# Patient Record
Sex: MALE | Race: WHITE | NOT HISPANIC OR LATINO | Employment: OTHER | RURAL
[De-identification: names, ages, dates, MRNs, and addresses within clinical notes are randomized per-mention and may not be internally consistent; named-entity substitution may affect disease eponyms.]

---

## 2020-10-27 ENCOUNTER — HISTORICAL (OUTPATIENT)
Dept: ADMINISTRATIVE | Facility: HOSPITAL | Age: 70
End: 2020-10-27

## 2021-04-14 ENCOUNTER — HISTORICAL (OUTPATIENT)
Dept: ADMINISTRATIVE | Facility: HOSPITAL | Age: 71
End: 2021-04-14

## 2021-04-14 LAB
25(OH)D3 SERPL-MCNC: 29.2 NG/ML
ALBUMIN SERPL BCP-MCNC: 3.8 G/DL (ref 3.5–5)
ALBUMIN/GLOB SERPL: 1.3 {RATIO}
ALP SERPL-CCNC: 93 U/L (ref 45–115)
ALT SERPL W P-5'-P-CCNC: 26 U/L (ref 16–61)
ANION GAP SERPL CALCULATED.3IONS-SCNC: 9.8 MMOL/L (ref 7–16)
AST SERPL W P-5'-P-CCNC: 20 U/L (ref 15–37)
BASOPHILS # BLD AUTO: 0.02 X10E3/UL (ref 0–0.2)
BASOPHILS NFR BLD AUTO: 0.4 % (ref 0–1)
BILIRUB SERPL-MCNC: 0.4 MG/DL (ref 0–1.2)
BUN SERPL-MCNC: 22 MG/DL (ref 7–18)
BUN/CREAT SERPL: 24
CALCIUM SERPL-MCNC: 8.6 MG/DL (ref 8.5–10.1)
CHLORIDE SERPL-SCNC: 110 MMOL/L (ref 98–107)
CO2 SERPL-SCNC: 27 MMOL/L (ref 21–32)
CREAT SERPL-MCNC: 0.93 MG/DL (ref 0.7–1.3)
EOSINOPHIL # BLD AUTO: 0.49 X10E3/UL (ref 0–0.5)
EOSINOPHIL NFR BLD AUTO: 8.6 % (ref 1–4)
ERYTHROCYTE [DISTWIDTH] IN BLOOD BY AUTOMATED COUNT: 13.5 % (ref 11.5–14.5)
GLOBULIN SER-MCNC: 3 G/DL (ref 2–4)
GLUCOSE SERPL-MCNC: 103 MG/DL (ref 74–106)
HCT VFR BLD AUTO: 37.1 % (ref 40–54)
HGB BLD-MCNC: 11.7 G/DL (ref 13.5–18)
IMM GRANULOCYTES # BLD AUTO: 0.01 X10E3/UL (ref 0–0.04)
IMM GRANULOCYTES NFR BLD: 0.2 % (ref 0–0.4)
LYMPHOCYTES # BLD AUTO: 2.02 X10E3/UL (ref 1–4.8)
LYMPHOCYTES NFR BLD AUTO: 35.4 % (ref 27–41)
MCH RBC QN AUTO: 30.1 PG (ref 27–31)
MCHC RBC AUTO-ENTMCNC: 31.5 G/DL (ref 32–36)
MCV RBC AUTO: 95.4 FL (ref 80–96)
MONOCYTES # BLD AUTO: 0.4 X10E3/UL (ref 0–0.8)
MONOCYTES NFR BLD AUTO: 7 % (ref 2–6)
MPC BLD CALC-MCNC: 10.4 FL (ref 9.4–12.4)
NEUTROPHILS # BLD AUTO: 2.77 X10E3/UL (ref 1.8–7.7)
NEUTROPHILS NFR BLD AUTO: 48.4 % (ref 53–65)
NRBC # BLD AUTO: 0 X10E3/UL (ref 0–0)
NRBC, AUTO (.00): 0 /100 (ref 0–0)
PLATELET # BLD AUTO: 177 X10E3/UL (ref 150–400)
POTASSIUM SERPL-SCNC: 4.8 MMOL/L (ref 3.5–5.1)
PROT SERPL-MCNC: 6.8 G/DL (ref 6.4–8.2)
PSA SERPL-MCNC: 1.69 NG/ML (ref 0–4.1)
RBC # BLD AUTO: 3.89 X10E6/UL (ref 4.6–6.2)
SODIUM SERPL-SCNC: 142 MMOL/L (ref 136–145)
TSH SERPL DL<=0.005 MIU/L-ACNC: 0.88 UIU/ML (ref 0.36–3.74)
URATE SERPL-MCNC: 4.2 MG/DL (ref 3.5–7.2)
WBC # BLD AUTO: 5.71 X10E3/UL (ref 4.5–11)

## 2021-11-13 ENCOUNTER — HOSPITAL ENCOUNTER (EMERGENCY)
Facility: HOSPITAL | Age: 71
Discharge: HOME | End: 2021-11-13
Attending: FAMILY MEDICINE
Payer: MEDICARE

## 2021-11-13 VITALS
RESPIRATION RATE: 18 BRPM | SYSTOLIC BLOOD PRESSURE: 118 MMHG | TEMPERATURE: 98 F | HEART RATE: 95 BPM | DIASTOLIC BLOOD PRESSURE: 64 MMHG | OXYGEN SATURATION: 93 % | WEIGHT: 207 LBS

## 2021-11-13 DIAGNOSIS — S00.03XA CONTUSION OF SCALP, INITIAL ENCOUNTER: ICD-10-CM
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
## 2021-11-13 DIAGNOSIS — T14.90XA TRAUMA: ICD-10-CM

## 2021-11-13 LAB
ALBUMIN SERPL BCP-MCNC: 3.6 G/DL (ref 3.5–5)
ALBUMIN/GLOB SERPL: 1.2 {RATIO}
ALP SERPL-CCNC: 94 U/L (ref 45–115)
ALT SERPL W P-5'-P-CCNC: 38 U/L (ref 16–61)
ANION GAP SERPL CALCULATED.3IONS-SCNC: 13 MMOL/L (ref 7–16)
AST SERPL W P-5'-P-CCNC: 36 U/L (ref 15–37)
BASOPHILS # BLD AUTO: 0.01 K/UL (ref 0–0.2)
BASOPHILS NFR BLD AUTO: 0.1 % (ref 0–1)
BILIRUB SERPL-MCNC: 0.2 MG/DL (ref 0–1.2)
BUN SERPL-MCNC: 23 MG/DL (ref 7–18)
BUN/CREAT SERPL: 17 (ref 6–20)
CALCIUM SERPL-MCNC: 8.6 MG/DL (ref 8.5–10.1)
CHLORIDE SERPL-SCNC: 104 MMOL/L (ref 98–107)
CO2 SERPL-SCNC: 27 MMOL/L (ref 21–32)
CREAT SERPL-MCNC: 1.39 MG/DL (ref 0.7–1.3)
DIFFERENTIAL METHOD BLD: ABNORMAL
EOSINOPHIL # BLD AUTO: 0.38 K/UL (ref 0–0.5)
EOSINOPHIL NFR BLD AUTO: 5.3 % (ref 1–4)
ERYTHROCYTE [DISTWIDTH] IN BLOOD BY AUTOMATED COUNT: 12.4 % (ref 11.5–14.5)
GLOBULIN SER-MCNC: 3.1 G/DL (ref 2–4)
GLUCOSE SERPL-MCNC: 95 MG/DL (ref 74–106)
HCT VFR BLD AUTO: 33.6 % (ref 40–54)
HGB BLD-MCNC: 11.3 G/DL (ref 13.5–18)
IMM GRANULOCYTES # BLD AUTO: 0.03 K/UL (ref 0–0.04)
IMM GRANULOCYTES NFR BLD: 0.4 % (ref 0–0.4)
LYMPHOCYTES # BLD AUTO: 2.02 K/UL (ref 1–4.8)
LYMPHOCYTES NFR BLD AUTO: 28.2 % (ref 27–41)
MCH RBC QN AUTO: 32.5 PG (ref 27–31)
MCHC RBC AUTO-ENTMCNC: 33.6 G/DL (ref 32–36)
MCV RBC AUTO: 96.6 FL (ref 80–96)
MONOCYTES # BLD AUTO: 0.45 K/UL (ref 0–0.8)
MONOCYTES NFR BLD AUTO: 6.3 % (ref 2–6)
MPC BLD CALC-MCNC: 9 FL (ref 9.4–12.4)
NEUTROPHILS # BLD AUTO: 4.28 K/UL (ref 1.8–7.7)
NEUTROPHILS NFR BLD AUTO: 59.7 % (ref 53–65)
PLATELET # BLD AUTO: 170 K/UL (ref 150–400)
POTASSIUM SERPL-SCNC: 4.1 MMOL/L (ref 3.5–5.1)
PROT SERPL-MCNC: 6.7 G/DL (ref 6.4–8.2)
RBC # BLD AUTO: 3.48 M/UL (ref 4.6–6.2)
SODIUM SERPL-SCNC: 140 MMOL/L (ref 136–145)
WBC # BLD AUTO: 7.17 K/UL (ref 4.5–11)

## 2021-11-13 PROCEDURE — 80053 COMPREHEN METABOLIC PANEL: CPT | Performed by: FAMILY MEDICINE

## 2021-11-13 PROCEDURE — 99900035 HC TECH TIME PER 15 MIN (STAT)

## 2021-11-13 PROCEDURE — 99285 EMERGENCY DEPT VISIT HI MDM: CPT | Mod: 25

## 2021-11-13 PROCEDURE — 96372 THER/PROPH/DIAG INJ SC/IM: CPT

## 2021-11-13 PROCEDURE — 36415 COLL VENOUS BLD VENIPUNCTURE: CPT | Performed by: FAMILY MEDICINE

## 2021-11-13 PROCEDURE — 99284 EMERGENCY DEPT VISIT MOD MDM: CPT | Performed by: FAMILY MEDICINE

## 2021-11-13 PROCEDURE — 63600175 PHARM REV CODE 636 W HCPCS: Performed by: FAMILY MEDICINE

## 2021-11-13 PROCEDURE — 85025 COMPLETE CBC W/AUTO DIFF WBC: CPT | Performed by: FAMILY MEDICINE

## 2021-11-13 RX ORDER — OMEPRAZOLE 10 MG/1
10 CAPSULE, DELAYED RELEASE ORAL DAILY
COMMUNITY

## 2021-11-13 RX ORDER — ESCITALOPRAM OXALATE 10 MG/1
10 TABLET ORAL DAILY
COMMUNITY

## 2021-11-13 RX ORDER — ATORVASTATIN CALCIUM 10 MG/1
10 TABLET, FILM COATED ORAL DAILY
COMMUNITY

## 2021-11-13 RX ORDER — ASPIRIN 81 MG/1
81 TABLET ORAL DAILY
COMMUNITY

## 2021-11-13 RX ORDER — HYDROCODONE BITARTRATE AND ACETAMINOPHEN 5; 325 MG/1; MG/1
1 TABLET ORAL EVERY 4 HOURS PRN
Qty: 18 TABLET | Refills: 0 | Status: SHIPPED | OUTPATIENT
Start: 2021-11-13 | End: 2021-11-20 | Stop reason: ALTCHOICE

## 2021-11-13 RX ORDER — ONDANSETRON 2 MG/ML
4 INJECTION INTRAMUSCULAR; INTRAVENOUS
Status: COMPLETED | OUTPATIENT
Start: 2021-11-13 | End: 2021-11-13

## 2021-11-13 RX ORDER — LISINOPRIL 10 MG/1
10 TABLET ORAL DAILY
COMMUNITY

## 2021-11-13 RX ORDER — ALLOPURINOL 100 MG/1
100 TABLET ORAL DAILY
COMMUNITY

## 2021-11-13 RX ORDER — MORPHINE SULFATE 4 MG/ML
4 INJECTION, SOLUTION INTRAMUSCULAR; INTRAVENOUS
Status: COMPLETED | OUTPATIENT
Start: 2021-11-13 | End: 2021-11-13

## 2021-11-13 RX ADMIN — MORPHINE SULFATE 4 MG: 4 INJECTION INTRAVENOUS at 07:11

## 2021-11-13 RX ADMIN — ONDANSETRON 4 MG: 2 INJECTION INTRAMUSCULAR; INTRAVENOUS at 07:11

## 2021-11-14 ENCOUNTER — TELEPHONE (OUTPATIENT)
Dept: EMERGENCY MEDICINE | Facility: HOSPITAL | Age: 71
End: 2021-11-14
Payer: MEDICARE

## 2021-11-20 ENCOUNTER — HOSPITAL ENCOUNTER (EMERGENCY)
Facility: HOSPITAL | Age: 71
Discharge: HOME | End: 2021-11-20
Attending: SPECIALIST
Payer: MEDICARE

## 2021-11-20 VITALS
BODY MASS INDEX: 30.92 KG/M2 | OXYGEN SATURATION: 95 % | HEART RATE: 84 BPM | TEMPERATURE: 98 F | WEIGHT: 204 LBS | RESPIRATION RATE: 18 BRPM | HEIGHT: 68 IN | DIASTOLIC BLOOD PRESSURE: 61 MMHG | SYSTOLIC BLOOD PRESSURE: 118 MMHG

## 2021-11-20 DIAGNOSIS — M51.36 DDD (DEGENERATIVE DISC DISEASE), LUMBAR: ICD-10-CM

## 2021-11-20 DIAGNOSIS — M50.30 DEGENERATIVE DISC DISEASE, CERVICAL: ICD-10-CM
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
## 2021-11-20 DIAGNOSIS — M54.50 ACUTE MIDLINE LOW BACK PAIN, UNSPECIFIED WHETHER SCIATICA PRESENT: ICD-10-CM

## 2021-11-20 DIAGNOSIS — R07.81 RIB PAIN ON LEFT SIDE: ICD-10-CM

## 2021-11-20 DIAGNOSIS — M51.36 DEGENERATIVE DISC DISEASE, LUMBAR: ICD-10-CM

## 2021-11-20 DIAGNOSIS — S22.42XA CLOSED FRACTURE OF MULTIPLE RIBS OF LEFT SIDE, INITIAL ENCOUNTER: ICD-10-CM

## 2021-11-20 PROCEDURE — 96375 TX/PRO/DX INJ NEW DRUG ADDON: CPT

## 2021-11-20 PROCEDURE — 99285 EMERGENCY DEPT VISIT HI MDM: CPT | Mod: 25

## 2021-11-20 PROCEDURE — 63600175 PHARM REV CODE 636 W HCPCS: Performed by: SPECIALIST

## 2021-11-20 PROCEDURE — 99284 EMERGENCY DEPT VISIT MOD MDM: CPT | Performed by: SPECIALIST

## 2021-11-20 PROCEDURE — 96374 THER/PROPH/DIAG INJ IV PUSH: CPT

## 2021-11-20 RX ORDER — OXYCODONE AND ACETAMINOPHEN 7.5; 325 MG/1; MG/1
1 TABLET ORAL EVERY 6 HOURS PRN
Qty: 12 TABLET | Refills: 0 | Status: SHIPPED | OUTPATIENT
Start: 2021-11-20 | End: 2021-11-23

## 2021-11-20 RX ORDER — ONDANSETRON 2 MG/ML
INJECTION INTRAMUSCULAR; INTRAVENOUS
Status: DISPENSED
Start: 2021-11-20 | End: 2021-11-20

## 2021-11-20 RX ORDER — KETOROLAC TROMETHAMINE 30 MG/ML
INJECTION, SOLUTION INTRAMUSCULAR; INTRAVENOUS
Status: DISPENSED
Start: 2021-11-20 | End: 2021-11-20

## 2021-11-20 RX ORDER — HYDROMORPHONE HYDROCHLORIDE 1 MG/ML
0.5 INJECTION, SOLUTION INTRAMUSCULAR; INTRAVENOUS; SUBCUTANEOUS
Status: COMPLETED | OUTPATIENT
Start: 2021-11-20 | End: 2021-11-20

## 2021-11-20 RX ORDER — KETOROLAC TROMETHAMINE 15 MG/ML
15 INJECTION, SOLUTION INTRAMUSCULAR; INTRAVENOUS
Status: COMPLETED | OUTPATIENT
Start: 2021-11-20 | End: 2021-11-20

## 2021-11-20 RX ORDER — ONDANSETRON 2 MG/ML
4 INJECTION INTRAMUSCULAR; INTRAVENOUS
Status: COMPLETED | OUTPATIENT
Start: 2021-11-20 | End: 2021-11-20

## 2021-11-20 RX ORDER — AMOXICILLIN AND CLAVULANATE POTASSIUM 875; 125 MG/1; MG/1
1 TABLET, FILM COATED ORAL 2 TIMES DAILY
Qty: 20 TABLET | Refills: 0 | Status: SHIPPED | OUTPATIENT
Start: 2021-11-20 | End: 2021-11-30

## 2021-11-20 RX ORDER — HYDROMORPHONE HYDROCHLORIDE 1 MG/ML
INJECTION, SOLUTION INTRAMUSCULAR; INTRAVENOUS; SUBCUTANEOUS
Status: DISPENSED
Start: 2021-11-20 | End: 2021-11-20

## 2021-11-20 RX ADMIN — ONDANSETRON 4 MG: 2 INJECTION INTRAMUSCULAR; INTRAVENOUS at 10:11

## 2021-11-20 RX ADMIN — HYDROMORPHONE HYDROCHLORIDE 0.5 MG: 1 INJECTION, SOLUTION INTRAMUSCULAR; INTRAVENOUS; SUBCUTANEOUS at 10:11

## 2021-11-20 RX ADMIN — KETOROLAC TROMETHAMINE 15 MG: 15 INJECTION, SOLUTION INTRAMUSCULAR; INTRAVENOUS at 10:11

## 2021-11-21 ENCOUNTER — TELEPHONE (OUTPATIENT)
Dept: EMERGENCY MEDICINE | Facility: HOSPITAL | Age: 71
End: 2021-11-21
Payer: MEDICARE

## 2021-12-27 ENCOUNTER — HOSPITAL ENCOUNTER (EMERGENCY)
Facility: HOSPITAL | Age: 71
Discharge: HOME | End: 2021-12-27
Attending: SURGERY
Payer: MEDICARE

## 2021-12-27 VITALS
BODY MASS INDEX: 30.46 KG/M2 | TEMPERATURE: 98 F | DIASTOLIC BLOOD PRESSURE: 77 MMHG | HEIGHT: 68 IN | OXYGEN SATURATION: 98 % | RESPIRATION RATE: 22 BRPM | SYSTOLIC BLOOD PRESSURE: 137 MMHG | WEIGHT: 201 LBS | HEART RATE: 97 BPM

## 2021-12-27 DIAGNOSIS — S51.812A LACERATION OF LEFT FOREARM, INITIAL ENCOUNTER: Primary | ICD-10-CM

## 2021-12-27 PROCEDURE — 12034 INTMD RPR S/TR/EXT 7.6-12.5: CPT

## 2021-12-27 PROCEDURE — 25000003 PHARM REV CODE 250: Performed by: SURGERY

## 2021-12-27 PROCEDURE — 63600175 PHARM REV CODE 636 W HCPCS: Performed by: SURGERY

## 2021-12-27 PROCEDURE — 99282 EMERGENCY DEPT VISIT SF MDM: CPT | Mod: 25 | Performed by: SURGERY

## 2021-12-27 PROCEDURE — 13121 CMPLX RPR S/A/L 2.6-7.5 CM: CPT

## 2021-12-27 PROCEDURE — 96372 THER/PROPH/DIAG INJ SC/IM: CPT | Mod: 59

## 2021-12-27 PROCEDURE — 12034 INTMD RPR S/TR/EXT 7.6-12.5: CPT | Performed by: SURGERY

## 2021-12-27 PROCEDURE — 13122 CMPLX RPR S/A/L ADDL 5 CM/>: CPT

## 2021-12-27 PROCEDURE — 99284 EMERGENCY DEPT VISIT MOD MDM: CPT | Mod: 25

## 2021-12-27 RX ORDER — LIDOCAINE HYDROCHLORIDE AND EPINEPHRINE 10; 10 MG/ML; UG/ML
10 INJECTION, SOLUTION INFILTRATION; PERINEURAL ONCE
Status: COMPLETED | OUTPATIENT
Start: 2021-12-27 | End: 2021-12-27

## 2021-12-27 RX ORDER — MORPHINE SULFATE 2 MG/ML
2 INJECTION, SOLUTION INTRAMUSCULAR; INTRAVENOUS
Status: COMPLETED | OUTPATIENT
Start: 2021-12-27 | End: 2021-12-27

## 2021-12-27 RX ADMIN — MORPHINE SULFATE 2 MG: 2 INJECTION, SOLUTION INTRAMUSCULAR; INTRAVENOUS at 05:12

## 2021-12-27 RX ADMIN — LIDOCAINE HYDROCHLORIDE,EPINEPHRINE BITARTRATE 10 ML: 10; .01 INJECTION, SOLUTION INFILTRATION; PERINEURAL at 06:12

## 2021-12-27 RX ADMIN — BACITRACIN, NEOMYCIN, POLYMYXIN B 3 EACH: 400; 3.5; 5 OINTMENT TOPICAL at 06:12

## 2021-12-28 ENCOUNTER — TELEPHONE (OUTPATIENT)
Dept: EMERGENCY MEDICINE | Facility: HOSPITAL | Age: 71
End: 2021-12-28
Payer: MEDICARE

## 2021-12-29 ENCOUNTER — HOSPITAL ENCOUNTER (EMERGENCY)
Facility: HOSPITAL | Age: 71
Discharge: HOME | End: 2021-12-29
Attending: EMERGENCY MEDICINE
Payer: MEDICARE

## 2021-12-29 VITALS
RESPIRATION RATE: 18 BRPM | HEART RATE: 94 BPM | WEIGHT: 204.31 LBS | DIASTOLIC BLOOD PRESSURE: 72 MMHG | TEMPERATURE: 98 F | HEIGHT: 68 IN | OXYGEN SATURATION: 94 % | BODY MASS INDEX: 30.96 KG/M2 | SYSTOLIC BLOOD PRESSURE: 115 MMHG

## 2021-12-29 DIAGNOSIS — S51.812D LACERATION OF LEFT FOREARM, SUBSEQUENT ENCOUNTER: ICD-10-CM

## 2021-12-29 DIAGNOSIS — Z51.89 ENCOUNTER FOR WOUND RE-CHECK: Primary | ICD-10-CM

## 2021-12-29 PROCEDURE — 99283 EMERGENCY DEPT VISIT LOW MDM: CPT

## 2021-12-29 PROCEDURE — 25000003 PHARM REV CODE 250: Performed by: EMERGENCY MEDICINE

## 2021-12-29 PROCEDURE — 99024 POSTOP FOLLOW-UP VISIT: CPT | Performed by: EMERGENCY MEDICINE

## 2021-12-29 RX ORDER — LISINOPRIL AND HYDROCHLOROTHIAZIDE 12.5; 2 MG/1; MG/1
TABLET ORAL
COMMUNITY
Start: 2021-10-20

## 2021-12-29 RX ADMIN — BACITRACIN, NEOMYCIN, POLYMYXIN B 1 EACH: 400; 3.5; 5 OINTMENT TOPICAL at 08:12

## 2023-07-11 DIAGNOSIS — M54.9 CHRONIC BACK PAIN, UNSPECIFIED BACK LOCATION, UNSPECIFIED BACK PAIN LATERALITY: ICD-10-CM

## 2023-07-11 DIAGNOSIS — G89.29 CHRONIC BACK PAIN: Primary | ICD-10-CM

## 2023-07-11 DIAGNOSIS — G89.29 CHRONIC BACK PAIN, UNSPECIFIED BACK LOCATION, UNSPECIFIED BACK PAIN LATERALITY: ICD-10-CM

## 2023-07-11 DIAGNOSIS — M54.9 CHRONIC BACK PAIN: Primary | ICD-10-CM

## 2023-07-17 ENCOUNTER — CLINICAL SUPPORT (OUTPATIENT)
Dept: REHABILITATION | Facility: HOSPITAL | Age: 73
End: 2023-07-17
Payer: MEDICARE

## 2023-07-17 DIAGNOSIS — G89.29 CHRONIC BACK PAIN, UNSPECIFIED BACK LOCATION, UNSPECIFIED BACK PAIN LATERALITY: ICD-10-CM

## 2023-07-17 DIAGNOSIS — G89.29 CHRONIC BACK PAIN: ICD-10-CM

## 2023-07-17 DIAGNOSIS — G89.29 CHRONIC RIGHT-SIDED LOW BACK PAIN WITH RIGHT-SIDED SCIATICA: Primary | ICD-10-CM

## 2023-07-17 DIAGNOSIS — M54.9 CHRONIC BACK PAIN: ICD-10-CM

## 2023-07-17 DIAGNOSIS — M54.41 CHRONIC RIGHT-SIDED LOW BACK PAIN WITH RIGHT-SIDED SCIATICA: Primary | ICD-10-CM

## 2023-07-17 DIAGNOSIS — R52 PAIN: ICD-10-CM

## 2023-07-17 DIAGNOSIS — M54.9 CHRONIC BACK PAIN, UNSPECIFIED BACK LOCATION, UNSPECIFIED BACK PAIN LATERALITY: ICD-10-CM

## 2023-07-17 PROCEDURE — 97110 THERAPEUTIC EXERCISES: CPT

## 2023-07-17 PROCEDURE — 97014 ELECTRIC STIMULATION THERAPY: CPT

## 2023-07-17 PROCEDURE — 97161 PT EVAL LOW COMPLEX 20 MIN: CPT

## 2023-07-17 NOTE — PLAN OF CARE
"[facility]   Physical Therapy Initial Evaluation      Name: Anoop Harkins Sr.  Clinic Number: [id number]    Therapy Diagnosis:   Encounter Diagnoses   Name Primary?    Chronic back pain     Chronic back pain, unspecified back location, unspecified back pain laterality         Physician: Alexis Tanner MD    Physician Orders: PT Eval and Treat   Medical Diagnosis from Referral: chronic back pain   Evaluation Date: 7/17/2023  Authorization Period Expiration: 7/10/2024  Plan of Care Expiration: 8/18/2023  Progress Note Due: 8/18/2023  Visit # / Visits authorized: 1/ 8     Precautions: Standard     Time In: 8:09  Time Out: 9:00  Total Billable Time: 51 minutes    Subjective     Date of onset: one year or more    History of current condition - Elio reports: Patient with complaints of low back pain that runs down R side. Patient reports isolated rib/thoracic pain in one spot that causes pain up through shoulder. Patient reports increased pain with washing dishing and sitting shelling peas. Patient reports fall off horse in April but reports this same pain prior to fall. Patient reports possible torn rotator cuff in R shoulder. Patient reports years of being torn off of horses. Patient has had a year of back pain. Patient has not had any imaging. Patient referred for conservative care.     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  has a past surgical history that includes Shoulder surgery; Groin exploration; Hip surgery; Finger surgery; and Knee surgery.    Medications:   Anoop has a current medication list which includes the following prescription(s): allopurinol, aspirin, atorvastatin, escitalopram oxalate, lisinopril, lisinopril-hydrochlorothiazide, and omeprazole.    Allergies:   Review of patient's allergies indicates:   Allergen Reactions    Phenergan plain Itching    Tetanus vaccines and toxoid     Demerol [meperidine] Hallucinations        Objective      Posture:  Standing lordosis: Increased  Sitting lordosis: Increased  Iliac crest height: right increased  PSIS height: right increased  Pelvic rotation/torsion: Yes  Comments: right anterior innominate rotation    Forward bend: limited 50% with pain on R   Back bend:  limited 50% with pain in thoracic   Lateral trunk lean: B limited 50% with pain in thoracic   Trunk rotation: B limited 50% with pain in thoracic     MANUAL MUSCLE TEST  Right left   Hip flexion MMT number: 4-/5 MMT strength: 4+/5   Hip abduction MMT strength: 4/5 MMT strength: 4+/5   Knee extension MMT strength: 4/5 MMT strength: 4+/5   Knee flexion  MMT strength: 4-/5 MMT strength: 4+/5   Ankle dorsiflexion MMT strength: 4+/5 MMT strength: 4+/5   Ankle plantar flexion  MMT strength: 4+/5 MMT strength: 4+/5       Special test Right  Left    SLR test < 60 degrees Negative Negative   SLR test > 60 degrees Negative Negative   Sitting slump test Negative Negative   Piriformis test Negative Negative   NINO test Negative Negative   SI forward bend Negative Negative   SI distraction Negative Negative   SI compression Negative Negative     Hamstring Special Test:   R 45 degrees with sharp pain  L 25 degrees from full extension     Gait assessment:   No major abnormalities     Palpation: mod  "tenderness and tightness within B quadratus lumborum, thoracic paraspinals    Dermatome: intact      Treatment     Total Treatment time (time-based codes) separate from Evaluation: 36 minutes     Elio received the treatments listed below:      therapeutic exercises to develop strength, endurance, ROM, flexibility, posture, and core stabilization for 16 minutes including:      Ther-Ex    Nustep    Wedge    Hamstring Stretch    Posterior Pelvic Tilts 15 x 3"   Lower Trunk Rotations 5 x 10" each    Single Knee to Chest Stretch 5 x 10" each    Piriformis Stretch 2 x 30" each    Figure 4 Stretch    Hip Adduction    Hip Abduction               manual therapy techniques: Joint mobilizations were applied to the: see details below for 10 minutes, including:    PT completed palpation assessment and noted right anterior innominate rotation. Manual correction of pelvic malalignment completed with mod effort by PT. Patient reported decrease in previous symptoms following correction.     supervised modalities after being cleared for contradictions: biphasic estim:  Anoop received biphasic electrical stimulation for pain and decrease muscle tightness to thoracic and lumbar paraspinals. Pt received burst mode at a rate of 2 pps for 10 minutes. Anoop tolerated treatment well without any adverse effects.     hot pack for 10 minutes with estim.      Patient Education and Home Exercises     Education provided:   - eval findings, Plan of Care, Home exercise program     Written Home Exercises Provided: yes. Exercises were reviewed and Elio was able to demonstrate them prior to the end of the session.  Elio demonstrated good  understanding of the education provided. See EMR under Patient Instructions for exercises provided during therapy sessions.    Assessment     Anoop is a 73 y.o. male referred to outpatient Physical Therapy with a medical diagnosis of chronic low back pain. Patient presents with increased pain, decreased strength, " and decreased range of motion that limits functional mobility and gait. Patient able to complete exercises but did have increased amount of cramping in hamstrings. Mod tactile and verbal cuing with exercises to decrease compensations to improve form and improve core activation. PT corrected R hip anterior rotation with minimal change post correction. Modalities used to decrease soreness. No adverse effects post treatment.     Patient prognosis is Good.   Patient will benefit from skilled outpatient Physical Therapy to address the deficits stated above and in the chart below, provide patient /family education, and to maximize patientt's level of independence.     Plan of care discussed with patient: Yes  Patient's spiritual, cultural and educational needs considered and patient is agreeable to the plan of care and goals as stated below:     Anticipated Barriers for therapy: chronic pain, hobbies, compliance with Home exercise program     Medical Necessity is demonstrated by the following  History  Co-morbidities and personal factors that may impact the plan of care [x] LOW: no personal factors / co-morbidities  [] MODERATE: 1-2 personal factors / co-morbidities  [] HIGH: 3+ personal factors / co-morbidities    Moderate / High Support Documentation:   Co-morbidities affecting plan of care: R rotator cuff tear    Personal Factors:   hobbies     Examination  Body Structures and Functions, activity limitations and participation restrictions that may impact the plan of care [x] LOW: addressing 1-2 elements  [] MODERATE: 3+ elements  [] HIGH: 4+ elements (please support below)    Moderate / High Support Documentation: N/A     Clinical Presentation [x] LOW: stable  [] MODERATE: Evolving  [] HIGH: Unstable     Decision Making/ Complexity Score: low       Goals:  Short Term Goals: 2 weeks   Patient will report decrease in pain to 4/10 to improve quality of life  Patient will report decrease in radiating occurrences from 50%  to 25% to allow patient the ability to perform activities of daily living   Patient will increase R lower extremity strength to 4/5 to improve ambulation ability  Patient will increase R hamstring 90/90 to 45 degrees without sharp pain     Long Term Goals: 4 weeks   Patient will report decrease in pain to 2/10 to improve quality of life  Patient will report decrease in radiating occurrences from 25% to 10% to allow patient the ability to perform activities of daily living   Patient will increase R lower extremity strength to 4+/5 to improve ambulation ability  Patient will increase R hamstring 90/90 to 25 degrees from full extension without sharp pain   Plan     Plan of care Certification: 7/17/2023 to 8/18/2023.    Outpatient Physical Therapy 2 times weekly for 4 weeks to include the following interventions: Electrical Stimulation IFC/Biphasic, Gait Training, Manual Therapy, Moist Heat/ Ice, Neuromuscular Re-ed, Patient Education, Self Care, Therapeutic Activities, Therapeutic Exercise, and Ultrasound.     Ale Kirk, PT, DPT 7/17/2023      Physician Signature: __________________________________________________________________________      Date: ___________________________

## 2023-07-20 ENCOUNTER — CLINICAL SUPPORT (OUTPATIENT)
Dept: REHABILITATION | Facility: HOSPITAL | Age: 73
End: 2023-07-20
Payer: MEDICARE

## 2023-07-20 DIAGNOSIS — R52 PAIN: Primary | ICD-10-CM

## 2023-07-20 PROCEDURE — 97110 THERAPEUTIC EXERCISES: CPT

## 2023-07-20 PROCEDURE — 97014 ELECTRIC STIMULATION THERAPY: CPT

## 2023-07-20 NOTE — PROGRESS NOTES
"[facility]   Physical Therapy Treatment Note      Name: Anoop Harkins Sr.  Clinic Number: [id number]    Therapy Diagnosis:   Encounter Diagnosis   Name Primary?    Pain Yes     Physician: Alexis Tanner MD    Visit Date: 7/20/2023    Physician Orders: PT Eval and Treat   Medical Diagnosis from Referral: chronic back pain   Evaluation Date: 7/17/2023  Authorization Period Expiration: 7/10/2024  Plan of Care Expiration: 8/18/2023  Progress Note Due: 8/18/2023  Visit # / Visits authorized: 2/ 8      Precautions: Standard      Time In: 8:44  Time Out: 9:31  Total Billable Time: 47 minutes    PTA Visit #: 0/5       Subjective     Pt reports: "What you did helped I can tell a difference. I have been digging stumps on a track hoe and it hasn't bothered me. My shoulder and rib cage still hurt"    He was compliant with home exercise program.  Response to previous treatment: improved pain  Functional change: too early in Plan of Care     Pain: 3/10  Location: right shoulder      Objective      Objective Measures updated at progress report unless specified.     Treatment     Elio received the treatments listed below:      therapeutic exercises to develop strength, endurance, ROM, flexibility, posture, and core stabilization for 16 minutes including:        Ther-Ex     Bike   8 minutes   Wedge  2 minutes    Hamstring Stretch  2 x 30" each    Posterior Pelvic Tilts 15 x 3"   Lower Trunk Rotations 5 x 10" each    Single Knee to Chest Stretch 5 x 10" each    Piriformis Stretch 2 x 30" each    Figure 4 Stretch  2 x 30" each    Hip Adduction     Hip Abduction                     Not performed---- manual therapy techniques: Joint mobilizations were applied to the: see details below for 10 minutes, including:     PT completed palpation assessment and noted right anterior innominate rotation. Manual correction of pelvic malalignment completed with mod effort by PT. Patient reported decrease in previous " symptoms following correction.      supervised modalities after being cleared for contradictions: biphasic estim:  Anoop received biphasic electrical stimulation for pain and decrease muscle tightness to thoracic and lumbar paraspinals. Pt received burst mode at a rate of 2 pps for 10 minutes. Anoop tolerated treatment well without any adverse effects.      hot pack for 10 minutes with estim.    Patient Education and Home Exercises       Education provided:   - Home exercise program     Written Home Exercises Provided: Patient instructed to cont prior HEP. Exercises were reviewed and Elio was able to demonstrate them prior to the end of the session.  Elio demonstrated good  understanding of the education provided. See EMR under Patient Instructions for exercises provided during therapy sessions    Assessment     Anoop is a 73 y.o. male referred to outpatient Physical Therapy with a medical diagnosis of chronic low back pain. Patient presents with increased pain, decreased strength, and decreased range of motion that limits functional mobility and gait. Patient able to complete exercises with mod tactile and verbal cuing to decrease compensations to improve form and stretch. PT assessed pelvis to find no mal-alignments noted. Modalities used to decrease soreness and muscle tightness. No adverse effects post treatment.     Elio Is progressing well towards his goals.   Pt prognosis is Good.     Pt will continue to benefit from skilled outpatient physical therapy to address the deficits listed in the problem list box on initial evaluation, provide pt/family education and to maximize pt's level of independence in the home and community environment.     Pt's spiritual, cultural and educational needs considered and pt agreeable to plan of care and goals.     Anticipated barriers to physical therapy: chronic pain, hobbies, compliance with Home exercise program     Goals:   Short Term Goals: 2 weeks   Patient will report  decrease in pain to 4/10 to improve quality of life  Patient will report decrease in radiating occurrences from 50% to 25% to allow patient the ability to perform activities of daily living   Patient will increase R lower extremity strength to 4/5 to improve ambulation ability  Patient will increase R hamstring 90/90 to 45 degrees without sharp pain      Long Term Goals: 4 weeks   Patient will report decrease in pain to 2/10 to improve quality of life  Patient will report decrease in radiating occurrences from 25% to 10% to allow patient the ability to perform activities of daily living   Patient will increase R lower extremity strength to 4+/5 to improve ambulation ability  Patient will increase R hamstring 90/90 to 25 degrees from full extension without sharp pain    Plan     Plan of care Certification: 7/17/2023 to 8/18/2023.     Outpatient Physical Therapy 2 times weekly for 4 weeks to include the following interventions: Electrical Stimulation IFC/Biphasic, Gait Training, Manual Therapy, Moist Heat/ Ice, Neuromuscular Re-ed, Patient Education, Self Care, Therapeutic Activities, Therapeutic Exercise, and Ultrasound.     Ale Kirk, PT, DPT 7/20/2023

## 2023-07-24 ENCOUNTER — CLINICAL SUPPORT (OUTPATIENT)
Dept: REHABILITATION | Facility: HOSPITAL | Age: 73
End: 2023-07-24
Payer: MEDICARE

## 2023-07-24 DIAGNOSIS — R52 PAIN: Primary | ICD-10-CM

## 2023-07-24 PROCEDURE — 97014 ELECTRIC STIMULATION THERAPY: CPT

## 2023-07-24 PROCEDURE — 97110 THERAPEUTIC EXERCISES: CPT

## 2023-07-24 NOTE — PROGRESS NOTES
"[facility]   Physical Therapy Treatment Note      Name: Anoop Harkins Sr.  Clinic Number: [id number]    Therapy Diagnosis:   Encounter Diagnosis   Name Primary?    Pain Yes       Physician: Alexis Tanner MD    Visit Date: 7/24/2023    Physician Orders: PT Eval and Treat   Medical Diagnosis from Referral: chronic back pain   Evaluation Date: 7/17/2023  Authorization Period Expiration: 7/10/2024  Plan of Care Expiration: 8/18/2023  Progress Note Due: 8/18/2023  Visit # / Visits authorized: 3/8      Precautions: Standard      Time In: 8:04  Time Out:8:54  Total Billable Time: 50 minutes (42 minutes billable and 8 minutes not billable for MHP)    PTA Visit #: 0/5   Subjective     Pt reports: "My lower back is feeling much better, but I am hurting in that one spot higher up. I picked butter beans this weekend and my lower back didn't bother me."     He was compliant with home exercise program.  Response to previous treatment: improved pain  Functional change: too early in Plan of Care     Pain: 3/10  Location: right periscapular and thoracic region      Objective      Objective Measures updated at progress report unless specified.     Treatment     Elio received the treatments listed below:      therapeutic exercises to develop strength, endurance, ROM, flexibility, posture, and core stabilization including:        Ther-Ex     Bike   8 minutes   Wedge  2 minutes    Hamstring Stretch  2 x 30" each (not completed)   Posterior Pelvic Tilts 15 x 3" (not completed)   Lower Trunk Rotations 5 x 10" each (not completed)   Single Knee to Chest Stretch 5 x 10" each (not completed)   Piriformis Stretch 2 x 30" each (not completed)   Figure 4 Stretch  2 x 30" each (not completed)        Rhomboid stretch  2 x 20"    Doorway stretch  2 x 20"    Upper Trapezius Stretch  2 x 20" each    Levator Scapulae Stretch  2 x 20" each    Scapular Retractions  20 x 3"        Trunk Rotations  5 x 10" each    Thoracic " "Extensions  3 x 10" in 2 positions    [employer]  ---     Direct contact modalities after being cleared for contradictions: PT completed ESTIM/US combo to R rhomboid to decrease tissue tightness and trigger points in order to decrease patient's pain. Patient received combo for 8 minutes in prone position. He tolerated treatment with no adverse effects.      Hot pack to cervical and thoracic muscles following treatment for 8 minutes to decrease feeling of stiffness.     Patient Education and Home Exercises       Education provided:   - Home exercise program     Written Home Exercises Provided: Patient instructed to cont prior HEP. Exercises were reviewed and Elio was able to demonstrate them prior to the end of the session.  Elio demonstrated good  understanding of the education provided. See EMR under Patient Instructions for exercises provided during therapy sessions    Assessment     Anoop is a 73 y.o. male referred to outpatient Physical Therapy with a medical diagnosis of chronic low back pain. Patient presents with increased pain, decreased strength, and decreased range of motion that limits functional mobility and gait. PT refocused treatment to treating patient's thoracic and periscapular symptoms and pain. Patient had palpable areas of increased tissue tightness and trigger points in right rhomboid and thoracic paraspinal muscles. Patient's tissue extensibility and irritation improved following ESTIM/US combo. PT provided patient with visual, verbal, and tactile cueing for newly added thoracic and scapular muscle stretches. Patient reported feeling "stiff" in his neck following stretches. PT applied MHP to promote increased tissue relaxation at end of treatment. Patient reported feeling better and end of session. No adverse effects noted to treatment interventions.      Elio Is progressing well towards his goals.   Pt prognosis is Good.     Pt will continue to benefit from skilled outpatient physical " therapy to address the deficits listed in the problem list box on initial evaluation, provide pt/family education and to maximize pt's level of independence in the home and community environment.     Pt's spiritual, cultural and educational needs considered and pt agreeable to plan of care and goals.     Anticipated barriers to physical therapy: chronic pain, hobbies, compliance with Home exercise program     Goals:   Short Term Goals: 2 weeks   Patient will report decrease in pain to 4/10 to improve quality of life  Patient will report decrease in radiating occurrences from 50% to 25% to allow patient the ability to perform activities of daily living   Patient will increase R lower extremity strength to 4/5 to improve ambulation ability  Patient will increase R hamstring 90/90 to 45 degrees without sharp pain      Long Term Goals: 4 weeks   Patient will report decrease in pain to 2/10 to improve quality of life  Patient will report decrease in radiating occurrences from 25% to 10% to allow patient the ability to perform activities of daily living   Patient will increase R lower extremity strength to 4+/5 to improve ambulation ability  Patient will increase R hamstring 90/90 to 25 degrees from full extension without sharp pain    Plan     Plan of care Certification: 7/17/2023 to 8/18/2023.     Outpatient Physical Therapy 2 times weekly for 4 weeks to include the following interventions: Electrical Stimulation IFC/Biphasic, Gait Training, Manual Therapy, Moist Heat/ Ice, Neuromuscular Re-ed, Patient Education, Self Care, Therapeutic Activities, Therapeutic Exercise, and Ultrasound.     Mickey Bustillos, PT, DPT 7/24/2023

## 2023-07-27 ENCOUNTER — CLINICAL SUPPORT (OUTPATIENT)
Dept: REHABILITATION | Facility: HOSPITAL | Age: 73
End: 2023-07-27
Payer: MEDICARE

## 2023-07-27 DIAGNOSIS — R52 PAIN: Primary | ICD-10-CM

## 2023-07-27 PROCEDURE — 97110 THERAPEUTIC EXERCISES: CPT

## 2023-07-27 PROCEDURE — 97140 MANUAL THERAPY 1/> REGIONS: CPT

## 2023-07-27 NOTE — PROGRESS NOTES
"[facility]   Physical Therapy Treatment Note      Name: Anoop Harkins Sr.  Clinic Number: [id number]    Therapy Diagnosis:   Encounter Diagnosis   Name Primary?    Pain Yes       Physician: Alexis Tanner MD    Visit Date: 7/27/2023    Physician Orders: PT Eval and Treat   Medical Diagnosis from Referral: chronic back pain   Evaluation Date: 7/17/2023  Authorization Period Expiration: 7/10/2024  Plan of Care Expiration: 8/18/2023  Progress Note Due: 8/18/2023  Visit # / Visits authorized: 4/8      Precautions: Standard      Time In: 8:02  Time Out:8:49  Total Billable Time: 47 minutes     PTA Visit #: 0/5   Subjective     Pt reports: "My butt hurt when I was shelling butter beans yesterday."     He was compliant with home exercise program.  Response to previous treatment: improved pain  Functional change: too early in Plan of Care     Pain: 3/10  Location: right periscapular and thoracic region      Objective      Objective Measures updated at progress report unless specified.     Treatment     Elio received the treatments listed below:      therapeutic exercises to develop strength, endurance, ROM, flexibility, posture, and core stabilization including:        Ther-Ex     Bike   8 minutes   Wedge  2 minutes    Hamstring Stretch  2 x 30" each    Posterior Pelvic Tilts 15 x 3"    Lower Trunk Rotations 5 x 10" each    Single Knee to Chest Stretch 5 x 10" each    Piriformis Stretch 2 x 30" each    Figure 4 Stretch  2 x 30" each         Rhomboid stretch  2 x 20"    Doorway stretch  2 x 20"    Upper Trapezius Stretch  2 x 20" each    Levator Scapulae Stretch  2 x 20" each    Scapular Retractions  20 x 3"        Trunk Rotations  5 x 10" each    Thoracic Extensions  3 x 10" in 2 positions    Open Books  5 x 10" each *     PT completed palpation assessment and noted right upslip and anterior innominate rotation. Manual correction of pelvic malalignment completed with Single: mod effort by PT. " Patient reported decreased in previous symptoms following correction.     Not performed--- Direct contact modalities after being cleared for contradictions: PT completed ESTIM/US combo to R rhomboid to decrease tissue tightness and trigger points in order to decrease patient's pain. Patient received combo for 8 minutes in prone position. He tolerated treatment with no adverse effects.      Not performed---- Hot pack to cervical and thoracic muscles following treatment for 8 minutes to decrease feeling of stiffness.     Patient Education and Home Exercises       Education provided:   - Home exercise program     Written Home Exercises Provided: Patient instructed to cont prior HEP. Exercises were reviewed and Elio was able to demonstrate them prior to the end of the session.  Elio demonstrated good  understanding of the education provided. See EMR under Patient Instructions for exercises provided during therapy sessions    Assessment     Anoop is a 73 y.o. male referred to outpatient Physical Therapy with a medical diagnosis of chronic low back pain. Patient presents with increased pain, decreased strength, and decreased range of motion that limits functional mobility and gait. PT refocused treatment to treating patient's thoracic and periscapular symptoms and pain. Patient had palpable areas of increased tissue tightness and trigger points in right rhomboid and thoracic paraspinal muscles. PT instructed patient in upper and lower stretches to decrease tightness. Patient required continued mod cuing with upper thoracic and cervical stretches. Patient with hip and buttock pain. PT assessed alignment and performed manuals with decreased pain post. Patient without adverse effects or pain post treatment.     Elio Is progressing well towards his goals.   Pt prognosis is Good.     Pt will continue to benefit from skilled outpatient physical therapy to address the deficits listed in the problem list box on initial  evaluation, provide pt/family education and to maximize pt's level of independence in the home and community environment.     Pt's spiritual, cultural and educational needs considered and pt agreeable to plan of care and goals.     Anticipated barriers to physical therapy: chronic pain, hobbies, compliance with Home exercise program     Goals:   Short Term Goals: 2 weeks   Patient will report decrease in pain to 4/10 to improve quality of life  Patient will report decrease in radiating occurrences from 50% to 25% to allow patient the ability to perform activities of daily living   Patient will increase R lower extremity strength to 4/5 to improve ambulation ability  Patient will increase R hamstring 90/90 to 45 degrees without sharp pain      Long Term Goals: 4 weeks   Patient will report decrease in pain to 2/10 to improve quality of life  Patient will report decrease in radiating occurrences from 25% to 10% to allow patient the ability to perform activities of daily living   Patient will increase R lower extremity strength to 4+/5 to improve ambulation ability  Patient will increase R hamstring 90/90 to 25 degrees from full extension without sharp pain    Plan     Plan of care Certification: 7/17/2023 to 8/18/2023.     Outpatient Physical Therapy 2 times weekly for 4 weeks to include the following interventions: Electrical Stimulation IFC/Biphasic, Gait Training, Manual Therapy, Moist Heat/ Ice, Neuromuscular Re-ed, Patient Education, Self Care, Therapeutic Activities, Therapeutic Exercise, and Ultrasound.     Ale Kirk, PT, DPT 7/27/2023

## 2023-08-01 ENCOUNTER — CLINICAL SUPPORT (OUTPATIENT)
Dept: REHABILITATION | Facility: HOSPITAL | Age: 73
End: 2023-08-01
Payer: MEDICARE

## 2023-08-01 DIAGNOSIS — R52 PAIN: Primary | ICD-10-CM

## 2023-08-01 PROCEDURE — 97110 THERAPEUTIC EXERCISES: CPT | Mod: CQ

## 2023-08-01 NOTE — PROGRESS NOTES
"[facility]   Physical Therapy Treatment Note      Name: Anoop Harkins Sr.  Clinic Number: [id number]    Therapy Diagnosis:   Encounter Diagnosis   Name Primary?    Pain Yes       Physician: Alexis Tanner MD    Visit Date: 8/1/2023    Physician Orders: PT Eval and Treat   Medical Diagnosis from Referral: chronic back pain   Evaluation Date: 7/17/2023  Authorization Period Expiration: 7/10/2024  Plan of Care Expiration: 8/18/2023  Progress Note Due: 8/18/2023  Visit # / Visits authorized: 5/8      Precautions: Standard      Time In: 8:02  Time Out:8:41  Total Billable Time: 39 minutes     PTA Visit #: 1/5   Subjective     Pt reports: "I'm a little sore after cutting corn off the cob yesterday."     He was compliant with home exercise program.  Response to previous treatment: improved pain  Functional change: too early in Plan of Care     Pain: 3/10  Location: right periscapular and thoracic region      Objective      Objective Measures updated at progress report unless specified.     Treatment     Elio received the treatments listed below:      therapeutic exercises to develop strength, endurance, ROM, flexibility, posture, and core stabilization including:        Ther-Ex     Bike   8 minutes   Wedge  2 minutes    Hamstring Stretch  2 x 30" each    Posterior Pelvic Tilts 15 x 3"    Lower Trunk Rotations 5 x 10" each    Single Knee to Chest Stretch 5 x 10" each    Piriformis Stretch 2 x 30" each    Figure 4 Stretch  2 x 30" each         Rhomboid stretch  2 x 20"    Doorway stretch  2 x 20"    Upper Trapezius Stretch  2 x 20" each    Levator Scapulae Stretch  2 x 20" each    Scapular Retractions  20 x 3"        Trunk Rotations  5 x 10" each    Thoracic Extensions  3 x 10" in 2 positions    Open Books  5 x 10" each *     NOT PERFORMED---PT completed palpation assessment and noted right upslip and anterior innominate rotation. Manual correction of pelvic malalignment completed with Single: mod " effort by PT. Patient reported decreased in previous symptoms following correction.     Not performed--- Direct contact modalities after being cleared for contradictions: PT completed ESTIM/US combo to R rhomboid to decrease tissue tightness and trigger points in order to decrease patient's pain. Patient received combo for 8 minutes in prone position. He tolerated treatment with no adverse effects.      Not performed---- Hot pack to cervical and thoracic muscles following treatment for 8 minutes to decrease feeling of stiffness.     Patient Education and Home Exercises       Education provided:   - Home exercise program     Written Home Exercises Provided: Patient instructed to cont prior HEP. Exercises were reviewed and Elio was able to demonstrate them prior to the end of the session.  Elio demonstrated good  understanding of the education provided. See EMR under Patient Instructions for exercises provided during therapy sessions    Assessment     Anoop is a 73 y.o. male referred to outpatient Physical Therapy with a medical diagnosis of chronic low back pain. Patient presents with increased pain, decreased strength, and decreased range of motion that limits functional mobility and gait. LPTA provided pt with setup on bike to decrease stiffness and promote musculoskeletal warmup.  Pt displayed increased carryover of previous tx. Pt required mod cueing for progression through exercises with proper form and count. Pt reports overall decrease in pain symptoms since evaluation.  LPTA assessed alignment and determined no pelvic malalignment. No adverse effects noted.    Elio Is progressing well towards his goals.   Pt prognosis is Good.     Pt will continue to benefit from skilled outpatient physical therapy to address the deficits listed in the problem list box on initial evaluation, provide pt/family education and to maximize pt's level of independence in the home and community environment.     Pt's spiritual,  cultural and educational needs considered and pt agreeable to plan of care and goals.     Anticipated barriers to physical therapy: chronic pain, hobbies, compliance with Home exercise program     Goals:   Short Term Goals: 2 weeks   Patient will report decrease in pain to 4/10 to improve quality of life  Patient will report decrease in radiating occurrences from 50% to 25% to allow patient the ability to perform activities of daily living   Patient will increase R lower extremity strength to 4/5 to improve ambulation ability  Patient will increase R hamstring 90/90 to 45 degrees without sharp pain      Long Term Goals: 4 weeks   Patient will report decrease in pain to 2/10 to improve quality of life  Patient will report decrease in radiating occurrences from 25% to 10% to allow patient the ability to perform activities of daily living   Patient will increase R lower extremity strength to 4+/5 to improve ambulation ability  Patient will increase R hamstring 90/90 to 25 degrees from full extension without sharp pain    Plan     Plan of care Certification: 7/17/2023 to 8/18/2023.     Outpatient Physical Therapy 2 times weekly for 4 weeks to include the following interventions: Electrical Stimulation IFC/Biphasic, Gait Training, Manual Therapy, Moist Heat/ Ice, Neuromuscular Re-ed, Patient Education, Self Care, Therapeutic Activities, Therapeutic Exercise, and Ultrasound.   Hold for a week, if no increased symptoms will discharge per pt call and PT order.  AUSTYN Canela  8/1/2023

## 2024-01-19 ENCOUNTER — CLINICAL SUPPORT (OUTPATIENT)
Dept: REHABILITATION | Facility: HOSPITAL | Age: 74
End: 2024-01-19
Payer: MEDICARE

## 2024-01-19 DIAGNOSIS — Z98.890 S/P RIGHT ROTATOR CUFF REPAIR: Primary | ICD-10-CM

## 2024-01-19 DIAGNOSIS — Z98.890 S/P RIGHT ROTATOR CUFF REPAIR: ICD-10-CM

## 2024-01-19 DIAGNOSIS — M25.511 ACUTE PAIN OF RIGHT SHOULDER: Primary | ICD-10-CM

## 2024-01-19 PROCEDURE — 97110 THERAPEUTIC EXERCISES: CPT

## 2024-01-19 PROCEDURE — 97016 VASOPNEUMATIC DEVICE THERAPY: CPT

## 2024-01-19 PROCEDURE — 97161 PT EVAL LOW COMPLEX 20 MIN: CPT

## 2024-01-19 NOTE — PLAN OF CARE
[facility]   Physical Therapy Initial Evaluation      Name: Anoop Harkins Sr.  Clinic Number: [id number]    Therapy Diagnosis:   Encounter Diagnoses   Name Primary?    S/P right rotator cuff repair     Acute pain of right shoulder Yes        Physician: Keven Vazquez Jr., *    Physician Orders: PT Eval and Treat   Medical Diagnosis from Referral: R rotator cuff repair and subacromial debridement   Evaluation Date: 1/19/2024  Authorization Period Expiration: 1/18/2025  Plan of Care Expiration: 3/1/2024  Progress Note Due: 3/1/2024  Date of Surgery: 1/18/2024  Visit # / Visits authorized: 1/12   FOTO: to be completed on visit 6     Precautions: Standard     Time In: 10:15  Time Out: 10:54  Total Billable Time: 39 minutes    Subjective     Date of onset: 1/18/2024    History of current condition - Elio reports: R rotator cuff repair and subacromial debridement on 1/18/2024 after chronic history of R shoulder pain. Patient reports that his nerve block hasn't worn off yet in his R shoulder so he denies pain at this time. Patient reports that his wife is assisting him with dressing and ADLs at this time. Patient has previous history of B bicep tears and previous RTC repair on L shoulder    Falls: None     Imaging: MRI studies: prior to surgery     Prior Therapy: None   Social History:  lives with their spouse  Occupation: Business owner   Prior Level of Function: independent   Current Level of Function: patient is currently requiring assistance for ADLs     Pain:  Current 1/10, worst 1/10, best 1/10   Location: right shoulder  Description: Aching  Aggravating Factors: no identified aggravating factors   Easing Factors: pain medication and ice    Patients goals: be able to use this R arm to tend to horses      Medical History:   Past Medical History:   Diagnosis Date    Coronary artery disease     Depression     GERD (gastroesophageal reflux disease)     Gout     Hypertension        Surgical  History:   Anoop Harkins .  has a past surgical history that includes Shoulder surgery; Groin exploration; Hip surgery; Finger surgery; and Knee surgery.    Medications:   Anoop has a current medication list which includes the following prescription(s): allopurinol, aspirin, atorvastatin, escitalopram oxalate, lisinopril, lisinopril-hydrochlorothiazide, and omeprazole.    Allergies:   Review of patient's allergies indicates:   Allergen Reactions    Phenergan plain Itching    Tetanus vaccines and toxoid     Demerol [meperidine] Hallucinations        Objective    Incisions: Patient has four laparoscopic incisions in R shoulder that have no signs of drainage at this time.    Posture: rounded shoulders Yes, forward head Yes, increased kyphotic curve No    Range of motion  Motion Right  Left    Shoulder flexion 131 degrees  WITHIN FUNCTIONAL LIMITS   Shoulder abduction Not measured today  WITHIN FUNCTIONAL LIMITS   Shoulder internal rotation 35 degrees  WITHIN FUNCTIONAL LIMITS   Shoulder external rotation 70 degrees  WITHIN FUNCTIONAL LIMITS   Elbow flexion WITHIN FUNCTIONAL LIMITS WITHIN FUNCTIONAL LIMITS   Elbow extension WITHIN FUNCTIONAL LIMITS WITHIN FUNCTIONAL LIMITS   Wrist flexion WITHIN FUNCTIONAL LIMITS WITHIN FUNCTIONAL LIMITS   Wrist extension WITHIN FUNCTIONAL LIMITS WITHIN FUNCTIONAL LIMITS   Ulnar deviation WITHIN FUNCTIONAL LIMITS WITHIN FUNCTIONAL LIMITS   Radial deviation WITHIN FUNCTIONAL LIMITS WITHIN FUNCTIONAL LIMITS     Passive range of motion: R shoulder ROM measurements were all Passive range of motion today     MANUAL MUSCLE TEST  Muscle Right  Left    Shoulder flexion MMT strength: 2-/5 MMT strength: 4+/5   Shoulder extension MMT strength: 2-/5 MMT strength: 4+/5   Shoulder abduction MMT strength: 2-/5 MMT strength: 4+/5   Shoulder internal rotation MMT strength: 2-/5 MMT strength: 4+/5   Shoulder external rotation MMT strength: 2-/5 MMT strength: 4+/5   Elbow flexion MMT strength: 3/5 MMT  "strength: 4+/5   Elbow extension MMT strength: 3/5 MMT strength: 4+/5   Wrist flexion MMT strength: 3+/5 MMT strength: 4+/5   Wrist extension MMT strength: 3+/5 MMT strength: 4+/5   Ulnar deviation MMT strength: 3+/5 MMT strength: 4+/5   Radial deviation MMT strength: 3+/5 MMT strength: 4+/5     Functional ability:  Dressing: AMB PT LEVEL OF ASSISTANCE: mod A   Driving: AMB PT LEVEL OF ASSISTANCE: unable   Overhead activity: AMB PT LEVEL OF ASSISTANCE: unable   Work/hobbies: AMB PT LEVEL OF ASSISTANCE: unable     Clinical test:  NA post operative     Palpation:    Intake Outcome Measure for FOTO Survey    Therapist reviewed FOTO scores for Anoop Torin Chen on 1/19/2024.   FOTO report - see Media section or FOTO account episode details.    Intake Score: 49%         Treatment     Total Treatment time (time-based codes) separate from Evaluation: 24 minutes     Elio received the treatments listed below:      therapeutic exercises to develop strength, endurance, ROM, and posture for 14 minutes including: see flowsheet below     Ther-Ex Reps        Pendulums  ----   Wrist flexion and extension  20 x each    Radial and Ulnar Deviation  20 x each    Ball Squeezes  30 x    Pronation and Supination  20 x        Isometrics    Shoulder Flexion  20 x 3"   Internal and External Rotation 20 x 3"    Shoulder Abduction  20 x 3"    Elbow Flexion  20 x 3"                        R shoulder Passive range of motion  Completed              supervised modalities after being cleared for contradictions: Vasopneumatic device applied to R shoulder on minimal pressure for 10 minutes to decrease pain and edema.       Patient Education and Home Exercises     Education provided:   - eval findings, plan of care, and Home exercise program     Written Home Exercises Provided: yes. Exercises were reviewed and Elio was able to demonstrate them prior to the end of the session.  Elio demonstrated good  understanding of the education provided. See EMR " under Patient Instructions for exercises provided during therapy sessions.    Assessment     Anoop is a 73 y.o. male referred to outpatient Physical Therapy with a medical diagnosis of R rotator cuff repair and subacromial debridement. Patient presents with R shoulder pain, decreased R shoulder ROM, decreased R UE muscle strength and motor control. Patient's impairments are currently limiting his use of R UE for ADLs and patient is R hand dominant.     PT removed post surgical dressing and assessed patient's incisions. PT cleaned incisions with iodine and covered with waterproof dressings provided by physician's office. PT provided education on covering incisions with waterproof dressings prior to showering. PT provided patient with visual and verbal cueing for proper form with exercises included in today's session. Patient required moderate verbal and tactile cues to decrease guarding with passive range of motion by PT. Patient had no adverse effects to treatment.     Patient prognosis is Good.   Patient will benefit from skilled outpatient Physical Therapy to address the deficits stated above and in the chart below, provide patient /family education, and to maximize patientt's level of independence.     Plan of care discussed with patient: Yes  Patient's spiritual, cultural and educational needs considered and patient is agreeable to the plan of care and goals as stated below:     Anticipated Barriers for therapy: compliance with Home exercise program and post surgical restrictions     Medical Necessity is demonstrated by the following  History  Co-morbidities and personal factors that may impact the plan of care [x] LOW: no personal factors / co-morbidities  [] MODERATE: 1-2 personal factors / co-morbidities  [] HIGH: 3+ personal factors / co-morbidities    Moderate / High Support Documentation:   Co-morbidities affecting plan of care: NA    Personal Factors:   no deficits     Examination  Body Structures and  Functions, activity limitations and participation restrictions that may impact the plan of care [x] LOW: addressing 1-2 elements  [] MODERATE: 3+ elements  [] HIGH: 4+ elements (please support below)    Moderate / High Support Documentation: NA     Clinical Presentation [x] LOW: stable  [] MODERATE: Evolving  [] HIGH: Unstable     Decision Making/ Complexity Score: low       Goals:  Short Term Goals: 3 weeks   Patient will independently complete Home exercise program with correct form.   Patient will report R shoulder pain less than or equal to 3/10 for improved quality of life.     Long Term Goals: 6 weeks   Pt will increase R shoulder flexion to 120 degrees, external rotation to C4, and internal rotation to L2 for independent dressing  Pt will increase R upper extremity to 4/5 to allow patient to perform activities of daily living independently  Pt will report decrease in pain to 1/10 to improve quality of life.  Pt will place 5 pound object in overhead shelf without hike and with less than or equal to 2/10 pain to allow patient to return to prior level of function   Patient will have increased FOTO score to greater than or equal to 65% for improved function.     Plan   Plan of care Certification: 1/19/2024 to 3/1/2024.    Outpatient Physical Therapy 3 times weekly for 6 weeks to include the following interventions: Electrical Stimulation unattended, Manual Therapy, Moist Heat/ Ice, Neuromuscular Re-ed, Patient Education, Therapeutic Activities, Therapeutic Exercise, Ultrasound, and Vasopneumatic device.     Mickey Bustillos, PT, DPT       Physician's Signature: _________________________________________ Date: ________________

## 2024-01-22 ENCOUNTER — CLINICAL SUPPORT (OUTPATIENT)
Dept: REHABILITATION | Facility: HOSPITAL | Age: 74
End: 2024-01-22
Payer: MEDICARE

## 2024-01-22 DIAGNOSIS — R52 PAIN: Primary | ICD-10-CM

## 2024-01-22 DIAGNOSIS — M25.511 ACUTE PAIN OF RIGHT SHOULDER: ICD-10-CM

## 2024-01-22 PROCEDURE — 97014 ELECTRIC STIMULATION THERAPY: CPT | Mod: CQ

## 2024-01-22 PROCEDURE — 97110 THERAPEUTIC EXERCISES: CPT | Mod: CQ

## 2024-01-22 NOTE — PROGRESS NOTES
"[facility]   Physical Therapy Treatment Note      Name: Anoop Harkins Sr.  Clinic Number: [id number]    Visit Date: 1/22/2024  Therapy Diagnosis:        Encounter Diagnoses   Name Primary?    S/P right rotator cuff repair      Acute pain of right shoulder Yes        Physician: Keven Vazquez Jr., *     Physician Orders: PT Eval and Treat   Medical Diagnosis from Referral: R rotator cuff repair and subacromial debridement   Evaluation Date: 1/19/2024  Authorization Period Expiration: 1/18/2025  Plan of Care Expiration: 3/1/2024  Progress Note Due: 3/1/2024  Date of Surgery: 1/18/2024  Visit # / Visits authorized: 2/12   FOTO: to be completed on visit 6      Precautions: Standard      Time In:  8:47  Time Out: 9:30  Total Billable Time: 43 minutes     PTA Visit #: 2/5       Subjective     Patient reports:  "I've not had any pain in my shoulder since I had surgery".   .  He was compliant with home exercise program.  Response to previous treatment: No adverse effects reported   Functional change:  Early in Plan of Care     Pain: 0/10  Location: right shoulder      Objective      Objective Measures updated at progress report unless specified.     Treatment     Elio received the treatments listed below:      therapeutic exercises to develop strength, endurance, ROM, and posture for 30 minutes including: see flowsheet below      Ther-Ex Reps          Pendulums circles  20 x each*    Pendulums flexion/ext 20 x each *    Wrist flexion and extension  20 x each    Radial and Ulnar Deviation  20 x each    Hand gripper  30 x  red *    Pronation and Supination  20 x              Isometrics     Shoulder Flexion  20 x 3"   Internal and External Rotation 20 x 3"    Shoulder Abduction  20 x 3"    Elbow Flexion  20 x 3"                                  R shoulder Passive range of motion  Completed                       supervised modalities after being cleared for contradictions: IFC Electrical " Stimulation:  Anoop received IFC Electrical Stimulation for pain control applied to the Right shoulder.  Anoop tolderated treatment well without any adverse effects.        cold pack for 10 minutes to Right shoulder with IFC.   Patient Education and Home Exercises       Education provided:   - Plan of Care, Delayed onset muscle soreness, Home exercise program     Written Home Exercises Provided: Patient instructed to cont prior HEP. Exercises were reviewed and Elio was able to demonstrate them prior to the end of the session.  Elio demonstrated fair  understanding of the education provided. See Electronic Medical Record under Patient Instructions for exercises provided during therapy sessions    Assessment     Anoop is a 73 y.o. male referred to outpatient Physical Therapy with a medical diagnosis of R rotator cuff repair and subacromial debridement. Patient presents with R shoulder pain, decreased R shoulder ROM, decreased R UE muscle strength and motor control. Patient's impairments are currently limiting his use of R UE for ADLs and patient is R hand dominant.  LPTA added There ex and increased reps of Therapeutic exercises as tolearted by patient.  Patient requires mod verbal and visual cues to progress through completion of Therapeutic exercises with proper alignment, speed of movement, count and hold times.  Minimal muscle guarding noted during completion of Passive range of motion.  No adverse effects noted or reported.       Patient's prognosis is good.   Patient will continue to benefit from skilled outpatient physical therapy to address the deficits listed in the problem list box on initial evaluation, provide pt/family education and to maximize pt's level of independence in the home and community environment.     Patient's spiritual, cultural and educational needs considered and pt agreeable to plan of care and goals.     Anticipated barriers to physical therapy:  compliance with Home exercise program  and post surgical restrictions     Goals:   Short Term Goals: 3 weeks   Patient will independently complete Home exercise program with correct form.   Patient will report R shoulder pain less than or equal to 3/10 for improved quality of life.      Long Term Goals: 6 weeks   Pt will increase R shoulder flexion to 120 degrees, external rotation to C4, and internal rotation to L2 for independent dressing  Pt will increase R upper extremity to 4/5 to allow patient to perform activities of daily living independently  Pt will report decrease in pain to 1/10 to improve quality of life.  Pt will place 5 pound object in overhead shelf without hike and with less than or equal to 2/10 pain to allow patient to return to prior level of function   Patient will have increased FOTO score to greater than or equal to 65% for improved function.     Plan     Plan of care Certification: 1/19/2024 to 3/1/2024.     Outpatient Physical Therapy 3 times weekly for 6 weeks to include the following interventions: Electrical Stimulation unattended, Manual Therapy, Moist Heat/ Ice, Neuromuscular Re-ed, Patient Education, Therapeutic Activities, Therapeutic Exercise, Ultrasound, and Vasopneumatic device.     Continue Plan of Care per PT order to progress patient toward rehab goals as tolerated by patient.   Karen Marshall, PTA  1/23/2024

## 2024-01-24 ENCOUNTER — CLINICAL SUPPORT (OUTPATIENT)
Dept: REHABILITATION | Facility: HOSPITAL | Age: 74
End: 2024-01-24
Payer: MEDICARE

## 2024-01-24 DIAGNOSIS — M25.511 ACUTE PAIN OF RIGHT SHOULDER: Primary | ICD-10-CM

## 2024-01-24 PROCEDURE — 97110 THERAPEUTIC EXERCISES: CPT | Mod: CQ

## 2024-01-24 PROCEDURE — 97014 ELECTRIC STIMULATION THERAPY: CPT | Mod: CQ

## 2024-01-24 NOTE — PROGRESS NOTES
"[facility]   Physical Therapy Treatment Note      Name: Anoop Harkins Sr.  Clinic Number: [id number]    Visit Date: 1/24/2024  Therapy Diagnosis:        Encounter Diagnoses   Name Primary?    S/P right rotator cuff repair      Acute pain of right shoulder Yes        Physician: Keven Vazquez Jr., *     Physician Orders: PT Eval and Treat   Medical Diagnosis from Referral: R rotator cuff repair and subacromial debridement   Evaluation Date: 1/19/2024  Authorization Period Expiration: 1/18/2025  Plan of Care Expiration: 3/1/2024  Progress Note Due: 3/1/2024  Date of Surgery: 1/18/2024  Visit # / Visits authorized: 3/12   FOTO: to be completed on visit 6      Precautions: Standard      Time In:  8:50  Time Out: 9:40  Total Billable Time: 50 minutes     PTA Visit #: 2/5       Subjective     Patient reports:  "I feel pretty good. This shoulder is doing way better than my other one".   .  He was compliant with home exercise program.  Response to previous treatment: No adverse effects reported   Functional change:  Early in Plan of Care     Pain: 0/10  Location: right shoulder      Objective      Objective Measures updated at progress report unless specified.     Treatment     Elio received the treatments listed below:      therapeutic exercises to develop strength, endurance, ROM, and posture for 30 minutes including: see flowsheet below      Ther-Ex Reps          Pendulums circles  20 x each   Pendulums flexion/ext 20 x each    Wrist flexion and extension  20 x each    Radial and Ulnar Deviation  20 x each    Hand gripper  30 x  red    Pronation and Supination  20 x              Isometrics     Shoulder Flexion  20 x 3"   Internal and External Rotation 20 x 3"    Shoulder Abduction  20 x 3"    Elbow Flexion  20 x 3"                                  R shoulder Passive range of motion  Completed                       supervised modalities after being cleared for contradictions: IFC Electrical " Stimulation:  Anoop received IFC Electrical Stimulation for pain control applied to the Right shoulder.  Anoop tolderated treatment well without any adverse effects.        cold pack for 10 minutes to Right shoulder with IFC.   Patient Education and Home Exercises       Education provided:   - Plan of Care, Delayed onset muscle soreness, Home exercise program     Written Home Exercises Provided: Patient instructed to cont prior HEP. Exercises were reviewed and Elio was able to demonstrate them prior to the end of the session.  Elio demonstrated fair  understanding of the education provided. See Electronic Medical Record under Patient Instructions for exercises provided during therapy sessions    Assessment     Anoop is a 73 y.o. male referred to outpatient Physical Therapy with a medical diagnosis of R rotator cuff repair and subacromial debridement. Patient presents with R shoulder pain, decreased R shoulder ROM, decreased R UE muscle strength and motor control. Patient's impairments are currently limiting his use of R UE for ADLs and patient is R hand dominant.  Pt tolerated recently added exercises with no c/o pain.  Pt progressed through tx with proper alignment, speed of movement, count and hold times. Minimal muscle guarding noted during completion of Passive range of motion. Easy progression and no pain noted throughout tx.  No adverse effects noted or reported.       Patient's prognosis is good.   Patient will continue to benefit from skilled outpatient physical therapy to address the deficits listed in the problem list box on initial evaluation, provide pt/family education and to maximize pt's level of independence in the home and community environment.     Patient's spiritual, cultural and educational needs considered and pt agreeable to plan of care and goals.     Anticipated barriers to physical therapy:  compliance with Home exercise program and post surgical restrictions     Goals:   Short Term  Goals: 3 weeks   Patient will independently complete Home exercise program with correct form.   Patient will report R shoulder pain less than or equal to 3/10 for improved quality of life.      Long Term Goals: 6 weeks   Pt will increase R shoulder flexion to 120 degrees, external rotation to C4, and internal rotation to L2 for independent dressing  Pt will increase R upper extremity to 4/5 to allow patient to perform activities of daily living independently  Pt will report decrease in pain to 1/10 to improve quality of life.  Pt will place 5 pound object in overhead shelf without hike and with less than or equal to 2/10 pain to allow patient to return to prior level of function   Patient will have increased FOTO score to greater than or equal to 65% for improved function.     Plan     Plan of care Certification: 1/19/2024 to 3/1/2024.     Outpatient Physical Therapy 3 times weekly for 6 weeks to include the following interventions: Electrical Stimulation unattended, Manual Therapy, Moist Heat/ Ice, Neuromuscular Re-ed, Patient Education, Therapeutic Activities, Therapeutic Exercise, Ultrasound, and Vasopneumatic device.     Continue Plan of Care per PT order to progress patient toward rehab goals as tolerated by patient.   AUSTYN Canela  1/24/2024

## 2024-01-26 ENCOUNTER — CLINICAL SUPPORT (OUTPATIENT)
Dept: REHABILITATION | Facility: HOSPITAL | Age: 74
End: 2024-01-26
Payer: MEDICARE

## 2024-01-26 DIAGNOSIS — M25.511 ACUTE PAIN OF RIGHT SHOULDER: Primary | ICD-10-CM

## 2024-01-26 PROCEDURE — 97010 HOT OR COLD PACKS THERAPY: CPT

## 2024-01-26 PROCEDURE — 97014 ELECTRIC STIMULATION THERAPY: CPT

## 2024-01-26 PROCEDURE — 97110 THERAPEUTIC EXERCISES: CPT

## 2024-01-26 NOTE — PROGRESS NOTES
"[facility]   Physical Therapy Treatment Note      Name: Anoop Harkins Sr.  Clinic Number: [id number]    Visit Date: 1/26/2024  Therapy Diagnosis:        Encounter Diagnoses   Name Primary?    S/P right rotator cuff repair      Acute pain of right shoulder Yes        Physician: Keven Vazquez Jr., *     Physician Orders: PT Eval and Treat   Medical Diagnosis from Referral: R rotator cuff repair and subacromial debridement   Evaluation Date: 1/19/2024  Authorization Period Expiration: 1/18/2025  Plan of Care Expiration: 3/1/2024  Progress Note Due: 3/1/2024  Date of Surgery: 1/18/2024  Visit # / Visits authorized: 4/12   FOTO: to be completed on visit 6      Precautions: Standard      Time In:  7:55 (later check in time)  Time Out: 8:40  Total Billable Time: 45 minutes     PTA Visit #: 0/5   Subjective   Patient reports:  "It feels fine today."   He was compliant with home exercise program.  Response to previous treatment: No adverse effects reported   Functional change:  Early in Plan of Care     Pain: 0/10  Location: right shoulder    Objective    Objective Measures updated at progress report unless specified.   Treatment   Elio received the treatments listed below:      therapeutic exercises to develop strength, endurance, ROM, and posture for 30 minutes including: see flowsheet below      Ther-Ex Reps          Pendulums circles  20 x each   Pendulums flexion/ext 20 x each    Wrist flexion and extension  20 x each    Radial and Ulnar Deviation  20 x each    Hand gripper  30 x  red    Pronation and Supination  20 x              Isometrics     Shoulder Flexion  20 x 3"   Internal and External Rotation 20 x 3"    Shoulder Abduction  20 x 3"    Elbow Flexion  20 x 3"                                  R shoulder Passive range of motion  Completed                   supervised modalities after being cleared for contradictions: IFC Electrical Stimulation:  Anoop received IFC Electrical " Stimulation for pain control applied to the Right shoulder.  Anoop tolderated treatment well without any adverse effects.      cold pack for 10 minutes to Right shoulder with IFC.   Patient Education and Home Exercises       Education provided:   - Plan of Care, Delayed onset muscle soreness, Home exercise program     Written Home Exercises Provided: Patient instructed to cont prior HEP. Exercises were reviewed and Elio was able to demonstrate them prior to the end of the session.  Elio demonstrated fair  understanding of the education provided. See Electronic Medical Record under Patient Instructions for exercises provided during therapy sessions    Assessment     Anoop is a 73 y.o. male referred to outpatient Physical Therapy with a medical diagnosis of R rotator cuff repair and subacromial debridement. Patient presents with R shoulder pain, decreased R shoulder ROM, decreased R UE muscle strength and motor control. Patient's impairments are currently limiting his use of R UE for ADLs and patient is R hand dominant.  Patient progressed through treatment with occasional cues for decreased guarding. Patient also required cueing for movement though increased ROM with pronation/supination task. Patient required maximal verbal and tactile cueing for decreased guarding during passive range of motion. No adverse effects noted to PT treatment.       Patient's prognosis is good.   Patient will continue to benefit from skilled outpatient physical therapy to address the deficits listed in the problem list box on initial evaluation, provide pt/family education and to maximize pt's level of independence in the home and community environment.     Patient's spiritual, cultural and educational needs considered and pt agreeable to plan of care and goals.     Anticipated barriers to physical therapy:  compliance with Home exercise program and post surgical restrictions     Goals:   Short Term Goals: 3 weeks   Patient will  independently complete Home exercise program with correct form.   Patient will report R shoulder pain less than or equal to 3/10 for improved quality of life.      Long Term Goals: 6 weeks   Pt will increase R shoulder flexion to 120 degrees, external rotation to C4, and internal rotation to L2 for independent dressing  Pt will increase R upper extremity to 4/5 to allow patient to perform activities of daily living independently  Pt will report decrease in pain to 1/10 to improve quality of life.  Pt will place 5 pound object in overhead shelf without hike and with less than or equal to 2/10 pain to allow patient to return to prior level of function   Patient will have increased FOTO score to greater than or equal to 65% for improved function.     Plan   Plan of care Certification: 1/19/2024 to 3/1/2024.     Outpatient Physical Therapy 3 times weekly for 6 weeks to include the following interventions: Electrical Stimulation unattended, Manual Therapy, Moist Heat/ Ice, Neuromuscular Re-ed, Patient Education, Therapeutic Activities, Therapeutic Exercise, Ultrasound, and Vasopneumatic device.     Continue Plan of Care per PT order to progress patient toward rehab goals as tolerated by patient.   Mickey Bustillos, PT, DPT     1/26/2024

## 2024-01-29 ENCOUNTER — CLINICAL SUPPORT (OUTPATIENT)
Dept: REHABILITATION | Facility: HOSPITAL | Age: 74
End: 2024-01-29
Payer: MEDICARE

## 2024-01-29 DIAGNOSIS — M25.511 ACUTE PAIN OF RIGHT SHOULDER: Primary | ICD-10-CM

## 2024-01-29 PROCEDURE — 97110 THERAPEUTIC EXERCISES: CPT | Mod: CQ

## 2024-01-29 PROCEDURE — 97014 ELECTRIC STIMULATION THERAPY: CPT | Mod: CQ

## 2024-01-29 PROCEDURE — 97010 HOT OR COLD PACKS THERAPY: CPT | Mod: CQ

## 2024-01-29 NOTE — PROGRESS NOTES
"[facility]   Physical Therapy Treatment Note      Name: Anoop Harkins Sr.  Clinic Number: [id number]    Visit Date: 1/29/2024  Therapy Diagnosis:        Encounter Diagnoses   Name Primary?    S/P right rotator cuff repair      Acute pain of right shoulder Yes        Physician: Keven Vazquez Jr., *     Physician Orders: PT Eval and Treat   Medical Diagnosis from Referral: R rotator cuff repair and subacromial debridement   Evaluation Date: 1/19/2024  Authorization Period Expiration: 1/18/2025  Plan of Care Expiration: 3/1/2024  Progress Note Due: 3/1/2024  Date of Surgery: 1/18/2024  Visit # / Visits authorized: 5/12   FOTO: to be completed on visit 6      Precautions: Standard      Time In:  8:03  Time Out: 9:00  Total Billable Time: 57 minutes     PTA Visit #: 1/5  Subjective   Patient reports:  "I have no complaints".     He was compliant with home exercise program.  Response to previous treatment: No adverse effects reported   Functional change:  Early in Plan of Care     Pain: 0/10  Location: right shoulder    Objective    Objective Measures updated at progress report unless specified.   Treatment   Elio received the treatments listed below:      therapeutic exercises to develop strength, endurance, ROM, and posture for 41 minutes including: see flowsheet below      Ther-Ex Reps          Pulleys flexion  4 minutes *    Seated table slides flexion  10 x 10" *    Seated ball rolls  10 x 10" *    Pendulums circles  20 x each   Pendulums flexion/ext 20 x each    Wrist flexion and extension  20 x each    Radial and Ulnar Deviation  20 x each    Hand gripper  30 x  red    Pronation and Supination  20 x              Isometrics     Shoulder Flexion  20 x 3"   Internal and External Rotation 20 x 3"    Shoulder Abduction  20 x 3"    Elbow Flexion  20 x 3"    Shoulder elbow extension  20 x 3" *                            R shoulder Passive range of motion  Completed                 "   supervised modalities after being cleared for contradictions: IFC Electrical Stimulation:  Anoop received IFC Electrical Stimulation for pain control x 12 minutes applied to the Right shoulder.  Anoop tolderated treatment well without any adverse effects.      cold pack for 10 minutes to Right shoulder with IFC.   Patient Education and Home Exercises       Education provided:   - Plan of Care, Delayed onset muscle soreness, Home exercise program     Written Home Exercises Provided: Patient instructed to cont prior HEP. Exercises were reviewed and Elio was able to demonstrate them prior to the end of the session.  Elio demonstrated fair  understanding of the education provided. See Electronic Medical Record under Patient Instructions for exercises provided during therapy sessions    Assessment     Anoop is a 73 y.o. male referred to outpatient Physical Therapy with a medical diagnosis of R rotator cuff repair and subacromial debridement. Patient presents with R shoulder pain, decreased R shoulder ROM, decreased R UE muscle strength and motor control. Patient's impairments are currently limiting his use of R UE for ADLs and patient is R hand dominant.  LPTA added active-assisted shoulder exercises per surgeon's protocol.  Patient requires increased time and effort to complete added AA Therapeutic exercises with mod verbal and visual cues for proper alignment, speed of movement, count, and hold times.  Patient without reports of increased pain at end of physical therapy treatment session.  No adverse effects noted.     Patient's prognosis is good.   Patient will continue to benefit from skilled outpatient physical therapy to address the deficits listed in the problem list box on initial evaluation, provide pt/family education and to maximize pt's level of independence in the home and community environment.     Patient's spiritual, cultural and educational needs considered and pt agreeable to plan of care and  goals.     Anticipated barriers to physical therapy:  compliance with Home exercise program and post surgical restrictions     Goals:   Short Term Goals: 3 weeks   Patient will independently complete Home exercise program with correct form.   Patient will report R shoulder pain less than or equal to 3/10 for improved quality of life.      Long Term Goals: 6 weeks   Pt will increase R shoulder flexion to 120 degrees, external rotation to C4, and internal rotation to L2 for independent dressing  Pt will increase R upper extremity to 4/5 to allow patient to perform activities of daily living independently  Pt will report decrease in pain to 1/10 to improve quality of life.  Pt will place 5 pound object in overhead shelf without hike and with less than or equal to 2/10 pain to allow patient to return to prior level of function   Patient will have increased FOTO score to greater than or equal to 65% for improved function.     Plan   Plan of care Certification: 1/19/2024 to 3/1/2024.     Outpatient Physical Therapy 3 times weekly for 6 weeks to include the following interventions: Electrical Stimulation unattended, Manual Therapy, Moist Heat/ Ice, Neuromuscular Re-ed, Patient Education, Therapeutic Activities, Therapeutic Exercise, Ultrasound, and Vasopneumatic device.     Continue Plan of Care per PT order to progress patient toward rehab goals as tolerated by patient.   AUSTYN Ortiz     1/29/2024

## 2024-02-01 ENCOUNTER — CLINICAL SUPPORT (OUTPATIENT)
Dept: REHABILITATION | Facility: HOSPITAL | Age: 74
End: 2024-02-01
Payer: MEDICARE

## 2024-02-01 DIAGNOSIS — M25.511 ACUTE PAIN OF RIGHT SHOULDER: Primary | ICD-10-CM

## 2024-02-01 PROCEDURE — 97014 ELECTRIC STIMULATION THERAPY: CPT

## 2024-02-01 PROCEDURE — 97110 THERAPEUTIC EXERCISES: CPT

## 2024-02-01 PROCEDURE — 97010 HOT OR COLD PACKS THERAPY: CPT

## 2024-02-01 NOTE — PROGRESS NOTES
"[facility]   Physical Therapy Treatment Note      Name: Anoop Harkins Sr.  Clinic Number: [id number]    Visit Date: 2/2/2024  Therapy Diagnosis:        Encounter Diagnoses   Name Primary?    S/P right rotator cuff repair      Acute pain of right shoulder Yes        Physician: Keven Vazquez Jr., *     Physician Orders: PT Eval and Treat   Medical Diagnosis from Referral: R rotator cuff repair and subacromial debridement   Evaluation Date: 1/19/2024  Authorization Period Expiration: 1/18/2025  Plan of Care Expiration: 3/1/2024  Progress Note Due: 3/1/2024  Date of Surgery: 1/18/2024  Visit # / Visits authorized: 7/12   FOTO: to be completed on visit 12     Precautions: Standard      Time In:  8:00  Time Out: 9:04  Total Billable Time: 64 minutes     PTA Visit #: 0/5  Subjective   Patient reports: "I was hurting last night. I just couldn't get comfortable."   He was compliant with home exercise program.  Response to previous treatment: No adverse effects reported   Functional change:  early in plan of care     Pain: 0/10  Location: right shoulder    Objective    Objective Measures updated at progress report unless specified.   Treatment   Elio received the treatments listed below:      therapeutic exercises to develop strength, endurance, ROM, and posture for 54 minutes including: see flowsheet below      Ther-Ex Reps          Pulleys flexion  4 minutes    Seated table slides flexion  10 x 10"    Seated ball rolls  10 x 10"    Pendulums circles  20 x each   Pendulums flexion/ext 20 x each    Wrist flexion and extension  20 x each    Radial and Ulnar Deviation  20 x each    Hand gripper  30 x green    Pronation and Supination  20 x    Scap Retractions 20 x 3" *       Isometrics     Shoulder Flexion  20 x 3"   Internal and External Rotation 20 x 3"    Shoulder Abduction  20 x 3"    Elbow Flexion  20 x 3"    Shoulder extension  20 x 3" *                            R shoulder Passive range of " motion  Completed                   supervised modalities after being cleared for contradictions: IFC Electrical Stimulation:  Anoop received IFC Electrical Stimulation for pain control x 10 minutes applied to the Right shoulder.  Anoop tolderated treatment well without any adverse effects.      cold pack for 10 minutes to Right shoulder with IFC.   Patient Education and Home Exercises       Education provided:   - Plan of Care, Delayed onset muscle soreness, Home exercise program     Written Home Exercises Provided: Patient instructed to cont prior HEP. Exercises were reviewed and Elio was able to demonstrate them prior to the end of the session.  Elio demonstrated fair  understanding of the education provided. See Electronic Medical Record under Patient Instructions for exercises provided during therapy sessions    Assessment     Anoop is a 73 y.o. male referred to outpatient Physical Therapy with a medical diagnosis of R rotator cuff repair and subacromial debridement. Patient presents with R shoulder pain, decreased R shoulder ROM, decreased R UE muscle strength and motor control. Patient's impairments are currently limiting his use of R UE for ADLs and patient is R hand dominant.  Patient continues to require visual and verbal cueing for proper speed and proper hold times with exercises. He also continues to require maximal cueing to decrease guarding during Passive range of motion. Patient demonstrates good Passive range of motion within post-surgical protocol expectations with and empty end feel in all directions at this time. No adverse effects noted to treatment.     Patient's prognosis is good.   Patient will continue to benefit from skilled outpatient physical therapy to address the deficits listed in the problem list box on initial evaluation, provide pt/family education and to maximize pt's level of independence in the home and community environment.     Patient's spiritual, cultural and educational  needs considered and pt agreeable to plan of care and goals.     Anticipated barriers to physical therapy:  compliance with Home exercise program and post surgical restrictions     Goals:   Short Term Goals: 3 weeks   Patient will independently complete Home exercise program with correct form.   Patient will report R shoulder pain less than or equal to 3/10 for improved quality of life.      Long Term Goals: 6 weeks   Pt will increase R shoulder flexion to 120 degrees, external rotation to C4, and internal rotation to L2 for independent dressing  Pt will increase R upper extremity to 4/5 to allow patient to perform activities of daily living independently  Pt will report decrease in pain to 1/10 to improve quality of life.  Pt will place 5 pound object in overhead shelf without hike and with less than or equal to 2/10 pain to allow patient to return to prior level of function   Patient will have increased FOTO score to greater than or equal to 65% for improved function.     Plan   Plan of care Certification: 1/19/2024 to 3/1/2024.     Outpatient Physical Therapy 3 times weekly for 6 weeks to include the following interventions: Electrical Stimulation unattended, Manual Therapy, Moist Heat/ Ice, Neuromuscular Re-ed, Patient Education, Therapeutic Activities, Therapeutic Exercise, Ultrasound, and Vasopneumatic device.     Continue Plan of Care per PT order to progress patient toward rehab goals as tolerated by patient.   Mickey Bustillos, PT, DPT     2/1/2024

## 2024-02-01 NOTE — PROGRESS NOTES
"[facility]   Physical Therapy Treatment Note      Name: Anoop Harkins Sr.  Clinic Number: [id number]    Visit Date: 2/1/2024  Therapy Diagnosis:        Encounter Diagnoses   Name Primary?    S/P right rotator cuff repair      Acute pain of right shoulder Yes        Physician: Keven Vazquez Jr., *     Physician Orders: PT Eval and Treat   Medical Diagnosis from Referral: R rotator cuff repair and subacromial debridement   Evaluation Date: 1/19/2024  Authorization Period Expiration: 1/18/2025  Plan of Care Expiration: 3/1/2024  Progress Note Due: 3/1/2024  Date of Surgery: 1/18/2024  Visit # / Visits authorized: 6/12   FOTO: to be completed on visit 6      Precautions: Standard      Time In:  8:00  Time Out: 9:01  Total Billable Time: 61 minutes     PTA Visit #: 0/5  Subjective   Patient reports: no pain upon arrival to PT. Patient's wife reports that patient has not been wearing his sling at night.    He was compliant with home exercise program.  Response to previous treatment: No adverse effects reported   Functional change:  Early in Plan of Care     Pain: 0/10  Location: right shoulder    Objective    Objective Measures updated at progress report unless specified.   Treatment   Elio received the treatments listed below:      therapeutic exercises to develop strength, endurance, ROM, and posture for 51 minutes including: see flowsheet below      Ther-Ex Reps          Pulleys flexion  4 minutes    Seated table slides flexion  10 x 10" *    Seated ball rolls  10 x 10" *    Pendulums circles  20 x each   Pendulums flexion/ext 20 x each    Wrist flexion and extension  20 x each    Radial and Ulnar Deviation  20 x each    Hand gripper  30 x  red    Pronation and Supination  20 x              Isometrics     Shoulder Flexion  20 x 3"   Internal and External Rotation 20 x 3"    Shoulder Abduction  20 x 3"    Elbow Flexion  20 x 3"    Shoulder extension  20 x 3" *                            R " shoulder Passive range of motion  Completed                   supervised modalities after being cleared for contradictions: IFC Electrical Stimulation:  Anoop received IFC Electrical Stimulation for pain control x 12 minutes applied to the Right shoulder.  Anoop tolderated treatment well without any adverse effects.      cold pack for 10 minutes to Right shoulder with IFC.   Patient Education and Home Exercises       Education provided:   - Plan of Care, Delayed onset muscle soreness, Home exercise program     Written Home Exercises Provided: Patient instructed to cont prior HEP. Exercises were reviewed and Elio was able to demonstrate them prior to the end of the session.  Elio demonstrated fair  understanding of the education provided. See Electronic Medical Record under Patient Instructions for exercises provided during therapy sessions    Assessment     Anoop is a 73 y.o. male referred to outpatient Physical Therapy with a medical diagnosis of R rotator cuff repair and subacromial debridement. Patient presents with R shoulder pain, decreased R shoulder ROM, decreased R UE muscle strength and motor control. Patient's impairments are currently limiting his use of R UE for ADLs and patient is R hand dominant.  PT progressed patient through treatment with visual, verbal, and tactile cueing for proper posture, form, hold times and decreased compensations during exercises. Patient requires maximal cueing to decrease guarding during Passive range of motion and manual techniques. PT removed patient's stitches from R shoulder and applied steri-strips to two anterior incisions. Patient had no reports of adverse effects to treatment.     Patient's prognosis is good.   Patient will continue to benefit from skilled outpatient physical therapy to address the deficits listed in the problem list box on initial evaluation, provide pt/family education and to maximize pt's level of independence in the home and community  environment.     Patient's spiritual, cultural and educational needs considered and pt agreeable to plan of care and goals.     Anticipated barriers to physical therapy:  compliance with Home exercise program and post surgical restrictions     Goals:   Short Term Goals: 3 weeks   Patient will independently complete Home exercise program with correct form.   Patient will report R shoulder pain less than or equal to 3/10 for improved quality of life.      Long Term Goals: 6 weeks   Pt will increase R shoulder flexion to 120 degrees, external rotation to C4, and internal rotation to L2 for independent dressing  Pt will increase R upper extremity to 4/5 to allow patient to perform activities of daily living independently  Pt will report decrease in pain to 1/10 to improve quality of life.  Pt will place 5 pound object in overhead shelf without hike and with less than or equal to 2/10 pain to allow patient to return to prior level of function   Patient will have increased FOTO score to greater than or equal to 65% for improved function.     Plan   Plan of care Certification: 1/19/2024 to 3/1/2024.     Outpatient Physical Therapy 3 times weekly for 6 weeks to include the following interventions: Electrical Stimulation unattended, Manual Therapy, Moist Heat/ Ice, Neuromuscular Re-ed, Patient Education, Therapeutic Activities, Therapeutic Exercise, Ultrasound, and Vasopneumatic device.     Continue Plan of Care per PT order to progress patient toward rehab goals as tolerated by patient.   Mickey Bustillos, PT, DPT     2/1/2024

## 2024-02-02 ENCOUNTER — CLINICAL SUPPORT (OUTPATIENT)
Dept: REHABILITATION | Facility: HOSPITAL | Age: 74
End: 2024-02-02
Payer: MEDICARE

## 2024-02-02 DIAGNOSIS — M25.511 ACUTE PAIN OF RIGHT SHOULDER: Primary | ICD-10-CM

## 2024-02-02 PROCEDURE — 97010 HOT OR COLD PACKS THERAPY: CPT

## 2024-02-02 PROCEDURE — 97014 ELECTRIC STIMULATION THERAPY: CPT

## 2024-02-02 PROCEDURE — 97110 THERAPEUTIC EXERCISES: CPT

## 2024-02-05 ENCOUNTER — CLINICAL SUPPORT (OUTPATIENT)
Dept: REHABILITATION | Facility: HOSPITAL | Age: 74
End: 2024-02-05
Payer: MEDICARE

## 2024-02-05 DIAGNOSIS — M25.511 ACUTE PAIN OF RIGHT SHOULDER: Primary | ICD-10-CM

## 2024-02-05 PROCEDURE — 97110 THERAPEUTIC EXERCISES: CPT | Mod: CQ

## 2024-02-05 PROCEDURE — 97010 HOT OR COLD PACKS THERAPY: CPT | Mod: CQ

## 2024-02-05 PROCEDURE — 97014 ELECTRIC STIMULATION THERAPY: CPT | Mod: CQ

## 2024-02-05 NOTE — PROGRESS NOTES
"[facility]   Physical Therapy Treatment Note      Name: Anoop Harkins Sr.  Clinic Number: [id number]    Visit Date: 2/5/2024  Therapy Diagnosis:        Encounter Diagnoses   Name Primary?    S/P right rotator cuff repair      Acute pain of right shoulder Yes        Physician: Keven Vazquez Jr., *     Physician Orders: PT Eval and Treat   Medical Diagnosis from Referral: R rotator cuff repair and subacromial debridement   Evaluation Date: 1/19/2024  Authorization Period Expiration: 1/18/2025  Plan of Care Expiration: 3/1/2024  Progress Note Due: 3/1/2024  Date of Surgery: 1/18/2024  Visit # / Visits authorized: 8/12   FOTO: to be completed on visit 12     Precautions: Standard      Time In:  8:01  Time Out: 8:49  Total Billable Time: 48 minutes     PTA Visit #: 1/5  Subjective   Patient reports: "Only if I move it" when asked about pain rating.     He was compliant with home exercise program.  Response to previous treatment: No adverse effects reported   Functional change:  early in plan of care     Pain: 1/10 with activity   Location: right shoulder    Objective    Objective Measures updated at progress report unless specified.   Treatment   Elio received the treatments listed below:      therapeutic exercises to develop strength, endurance, ROM, and posture.  see flowsheet below      Ther-Ex Reps          Pulleys flexion  4 minutes    Seated table slides flexion  10 x 10"    Seated ball rolls  10 x 10"    Pendulums circles  20 x each   Pendulums flexion/ext 20 x each    Wrist flexion and extension  20 x each    Radial and Ulnar Deviation  20 x each    Hand gripper  30 x green    Pronation and Supination  20 x    Scap Retractions 20 x 3"        Isometrics     Shoulder Flexion  20 x 3"   Internal and External Rotation 20 x 3"    Shoulder Abduction  20 x 3"    Elbow Flexion  20 x 3"    Shoulder extension  20 x 3"                            R shoulder Passive range of motion  Completed     "               supervised modalities after being cleared for contradictions: IFC Electrical Stimulation:  Anoop received IFC Electrical Stimulation for pain control x 10 minutes applied to the Right shoulder.  Anoop tolderated treatment well without any adverse effects.      cold pack for 10 minutes to Right shoulder with IFC.   Patient Education and Home Exercises       Education provided:   - Plan of Care, Delayed onset muscle soreness, Home exercise program     Written Home Exercises Provided: Patient instructed to cont prior HEP. Exercises were reviewed and Elio was able to demonstrate them prior to the end of the session.  Elio demonstrated fair  understanding of the education provided. See Electronic Medical Record under Patient Instructions for exercises provided during therapy sessions    Assessment     Anoop is a 73 y.o. male referred to outpatient Physical Therapy with a medical diagnosis of R rotator cuff repair and subacromial debridement. Patient presents with R shoulder pain, decreased R shoulder ROM, decreased R UE muscle strength and motor control. Patient's impairments are currently limiting his use of R UE for ADLs and patient is R hand dominant.  Patient requires min cues to progress through Therapeutic exercises with proper alignmnet, speed of movement, count, and hold times.  Patient without reports of increased pain during treatment session. Passive range of motion R UE WNL's per surgeon protocol with min muscle guarding noted.      Patient's prognosis is good.   Patient will continue to benefit from skilled outpatient physical therapy to address the deficits listed in the problem list box on initial evaluation, provide pt/family education and to maximize pt's level of independence in the home and community environment.     Patient's spiritual, cultural and educational needs considered and pt agreeable to plan of care and goals.     Anticipated barriers to physical therapy:  compliance with  Home exercise program and post surgical restrictions     Goals:   Short Term Goals: 3 weeks   Patient will independently complete Home exercise program with correct form.   Patient will report R shoulder pain less than or equal to 3/10 for improved quality of life.      Long Term Goals: 6 weeks   Pt will increase R shoulder flexion to 120 degrees, external rotation to C4, and internal rotation to L2 for independent dressing  Pt will increase R upper extremity to 4/5 to allow patient to perform activities of daily living independently  Pt will report decrease in pain to 1/10 to improve quality of life.  Pt will place 5 pound object in overhead shelf without hike and with less than or equal to 2/10 pain to allow patient to return to prior level of function   Patient will have increased FOTO score to greater than or equal to 65% for improved function.     Plan   Plan of care Certification: 1/19/2024 to 3/1/2024.     Outpatient Physical Therapy 3 times weekly for 6 weeks to include the following interventions: Electrical Stimulation unattended, Manual Therapy, Moist Heat/ Ice, Neuromuscular Re-ed, Patient Education, Therapeutic Activities, Therapeutic Exercise, Ultrasound, and Vasopneumatic device.     Continue Plan of Care per PT order to progress patient toward rehab goals as tolerated by patient.   AUSTYN Ortiz     2/5/2024

## 2024-02-07 ENCOUNTER — CLINICAL SUPPORT (OUTPATIENT)
Dept: REHABILITATION | Facility: HOSPITAL | Age: 74
End: 2024-02-07
Payer: MEDICARE

## 2024-02-07 DIAGNOSIS — M25.511 ACUTE PAIN OF RIGHT SHOULDER: Primary | ICD-10-CM

## 2024-02-07 PROCEDURE — 97110 THERAPEUTIC EXERCISES: CPT | Mod: CQ

## 2024-02-07 PROCEDURE — 97014 ELECTRIC STIMULATION THERAPY: CPT | Mod: CQ

## 2024-02-07 PROCEDURE — 97010 HOT OR COLD PACKS THERAPY: CPT | Mod: CQ

## 2024-02-07 NOTE — PROGRESS NOTES
"[facility]   Physical Therapy Treatment Note      Name: Anoop Harkins Sr.  Clinic Number: [id number]    Visit Date: 2/7/2024  Therapy Diagnosis:        Encounter Diagnoses   Name Primary?    S/P right rotator cuff repair      Acute pain of right shoulder Yes        Physician: Keven Vazquez Jr., *     Physician Orders: PT Eval and Treat   Medical Diagnosis from Referral: R rotator cuff repair and subacromial debridement   Evaluation Date: 1/19/2024  Authorization Period Expiration: 1/18/2025  Plan of Care Expiration: 3/1/2024  Progress Note Due: 3/1/2024  Date of Surgery: 1/18/2024  Visit # / Visits authorized: 9/12   FOTO: to be completed on visit 12     Precautions: Standard      Time In:  13:01  Time Out: 14:57  Total Billable Time: 56 minutes     PTA Visit #: 2/5  Subjective   Patient reports: "I have a little pain in my shoulder blade."     He was compliant with home exercise program.  Response to previous treatment: No adverse effects reported   Functional change:  early in plan of care     Pain: 1/10 with activity   Location: right shoulder    Objective    Objective Measures updated at progress report unless specified.   Treatment   Elio received the treatments listed below:      therapeutic exercises to develop strength, endurance, ROM, and posture.  see flowsheet below      Ther-Ex Reps          Pulleys flexion  4 minutes    Seated table slides flexion  10 x 10"    Seated ball rolls  10 x 10"    Pendulums circles  20 x each   Pendulums flexion/ext 20 x each    Wrist flexion and extension  20 x each    Radial and Ulnar Deviation  20 x each    Hand gripper  30 x green    Pronation and Supination  20 x    Scap Retractions 20 x 3"        Isometrics     Shoulder Flexion  20 x 3"   Internal and External Rotation 20 x 3"    Shoulder Abduction  20 x 3"    Elbow Flexion  20 x 3"    Shoulder extension  20 x 3"                            R shoulder Passive range of motion  Completed        "            supervised modalities after being cleared for contradictions: IFC Electrical Stimulation:  Anoop received IFC Electrical Stimulation for pain control x 10 minutes applied to the Right shoulder.  Anoop tolderated treatment well without any adverse effects.      cold pack for 10 minutes to Right shoulder with IFC.   Patient Education and Home Exercises       Education provided:   - Plan of Care, Delayed onset muscle soreness, Home exercise program     Written Home Exercises Provided: Patient instructed to cont prior HEP. Exercises were reviewed and Elio was able to demonstrate them prior to the end of the session.  Elio demonstrated fair  understanding of the education provided. See Electronic Medical Record under Patient Instructions for exercises provided during therapy sessions    Assessment     Anoop is a 73 y.o. male referred to outpatient Physical Therapy with a medical diagnosis of R rotator cuff repair and subacromial debridement. Patient presents with R shoulder pain, decreased R shoulder ROM, decreased R UE muscle strength and motor control. Patient's impairments are currently limiting his use of R UE for ADLs and patient is R hand dominant.  Pt requires min cues to progress through Therapeutic exercises with proper alignmnet, speed of movement, count, and hold times.  Pt displays decreased muscle guarding during passive range of motion. Passive range of motion completed within surgeon protocol with no c/o pain. No adverse effects noted.    Patient's prognosis is good.   Patient will continue to benefit from skilled outpatient physical therapy to address the deficits listed in the problem list box on initial evaluation, provide pt/family education and to maximize pt's level of independence in the home and community environment.     Patient's spiritual, cultural and educational needs considered and pt agreeable to plan of care and goals.     Anticipated barriers to physical therapy:  compliance  with Home exercise program and post surgical restrictions     Goals:   Short Term Goals: 3 weeks   Patient will independently complete Home exercise program with correct form.   Patient will report R shoulder pain less than or equal to 3/10 for improved quality of life.      Long Term Goals: 6 weeks   Pt will increase R shoulder flexion to 120 degrees, external rotation to C4, and internal rotation to L2 for independent dressing  Pt will increase R upper extremity to 4/5 to allow patient to perform activities of daily living independently  Pt will report decrease in pain to 1/10 to improve quality of life.  Pt will place 5 pound object in overhead shelf without hike and with less than or equal to 2/10 pain to allow patient to return to prior level of function   Patient will have increased FOTO score to greater than or equal to 65% for improved function.     Plan   Plan of care Certification: 1/19/2024 to 3/1/2024.     Outpatient Physical Therapy 3 times weekly for 6 weeks to include the following interventions: Electrical Stimulation unattended, Manual Therapy, Moist Heat/ Ice, Neuromuscular Re-ed, Patient Education, Therapeutic Activities, Therapeutic Exercise, Ultrasound, and Vasopneumatic device.     Continue Plan of Care per PT order to progress patient toward rehab goals as tolerated by patient.   AUSTYN Canela   2/7/2024

## 2024-02-09 ENCOUNTER — CLINICAL SUPPORT (OUTPATIENT)
Dept: REHABILITATION | Facility: HOSPITAL | Age: 74
End: 2024-02-09
Payer: MEDICARE

## 2024-02-09 DIAGNOSIS — M25.511 ACUTE PAIN OF RIGHT SHOULDER: Primary | ICD-10-CM

## 2024-02-09 PROCEDURE — 97140 MANUAL THERAPY 1/> REGIONS: CPT | Mod: CQ

## 2024-02-09 PROCEDURE — 97110 THERAPEUTIC EXERCISES: CPT | Mod: CQ

## 2024-02-09 PROCEDURE — 97014 ELECTRIC STIMULATION THERAPY: CPT | Mod: CQ

## 2024-02-09 NOTE — PROGRESS NOTES
"[facility]   Physical Therapy Treatment Note      Name: Anoop Harkins Sr.  Clinic Number: [id number]    Visit Date: 2/9/2024  Therapy Diagnosis:        Encounter Diagnoses   Name Primary?    S/P right rotator cuff repair      Acute pain of right shoulder Yes        Physician: Keven Vazquez Jr., *     Physician Orders: PT Eval and Treat   Medical Diagnosis from Referral: R rotator cuff repair and subacromial debridement   Evaluation Date: 1/19/2024  Authorization Period Expiration: 1/18/2025  Plan of Care Expiration: 3/1/2024  Progress Note Due: 3/1/2024  Date of Surgery: 1/18/2024  Visit # / Visits authorized: 10/12   FOTO: to be completed on visit 12     Precautions: Standard      Time In:  8:00  Time Out: 8:50  Total Billable Time: 55 minutes     PTA Visit #: 3/5  Subjective   Patient reports: "This surgery is the best surgery I've had".     He was compliant with home exercise program.  Response to previous treatment: No adverse effects reported   Functional change:  early in plan of care     Pain: 1/10 with activity   Location: right shoulder    Objective    Objective Measures updated at progress report unless specified.   Treatment   Elio received the treatments listed below:      therapeutic exercises to develop strength, endurance, ROM, and posture.  see flowsheet below      Ther-Ex Reps          Pulleys flexion  4 minutes    Seated table slides flexion  10 x 10"    Seated ball rolls  10 x 10"    Pendulums circles  20 x each   Pendulums flexion/ext 20 x each    Wrist flexion and extension  20 x each    Radial and Ulnar Deviation  20 x each    Hand gripper  30 x green    Pronation and Supination  20 x    Scap Retractions 20 x 3"        Isometrics     Shoulder Flexion  20 x 3"   Internal and External Rotation 20 x 3"    Shoulder Abduction  20 x 3"    Elbow Flexion  20 x 3"    Shoulder extension  20 x 3"                            R shoulder Passive range of motion  Completed        "            STM/MFR to decrease trigger points and muscle tightness in Right Rhomboid and Middle Trap follow by scapula mobs.     supervised modalities after being cleared for contradictions: IFC Electrical Stimulation:  Anoop received IFC Electrical Stimulation for pain control x 10 minutes applied to the Right shoulder.  Anoop tolderated treatment well without any adverse effects.      cold pack for 10 minutes to Right shoulder with IFC.   Patient Education and Home Exercises       Education provided:   - Plan of Care, Delayed onset muscle soreness, Home exercise program     Written Home Exercises Provided: Patient instructed to cont prior HEP. Exercises were reviewed and Elio was able to demonstrate them prior to the end of the session.  Elio demonstrated fair  understanding of the education provided. See Electronic Medical Record under Patient Instructions for exercises provided during therapy sessions    Assessment     Anoop is a 73 y.o. male referred to outpatient Physical Therapy with a medical diagnosis of R rotator cuff repair and subacromial debridement. Patient presents with R shoulder pain, decreased R shoulder ROM, decreased R UE muscle strength and motor control. Patient's impairments are currently limiting his use of R UE for ADLs and patient is R hand dominant.  Pt requires min cues to progress through Therapeutic exercises with proper alignmnet, speed of movement, count, and hold times.  Pt displays decreased muscle guarding during passive range of motion. Passive range of motion completed within surgeon protocol with no c/o pain. No adverse effects noted.    Patient's prognosis is good.   Patient will continue to benefit from skilled outpatient physical therapy to address the deficits listed in the problem list box on initial evaluation, provide pt/family education and to maximize pt's level of independence in the home and community environment.     Patient's spiritual, cultural and educational  needs considered and pt agreeable to plan of care and goals.     Anticipated barriers to physical therapy:  compliance with Home exercise program and post surgical restrictions     Goals:   Short Term Goals: 3 weeks   Patient will independently complete Home exercise program with correct form.   Patient will report R shoulder pain less than or equal to 3/10 for improved quality of life.      Long Term Goals: 6 weeks   Pt will increase R shoulder flexion to 120 degrees, external rotation to C4, and internal rotation to L2 for independent dressing  Pt will increase R upper extremity to 4/5 to allow patient to perform activities of daily living independently  Pt will report decrease in pain to 1/10 to improve quality of life.  Pt will place 5 pound object in overhead shelf without hike and with less than or equal to 2/10 pain to allow patient to return to prior level of function   Patient will have increased FOTO score to greater than or equal to 65% for improved function.     Plan   Plan of care Certification: 1/19/2024 to 3/1/2024.     Outpatient Physical Therapy 3 times weekly for 6 weeks to include the following interventions: Electrical Stimulation unattended, Manual Therapy, Moist Heat/ Ice, Neuromuscular Re-ed, Patient Education, Therapeutic Activities, Therapeutic Exercise, Ultrasound, and Vasopneumatic device.     Continue Plan of Care per PT order to progress patient toward rehab goals as tolerated by patient.   AUSTYN Ortiz   2/9/2024

## 2024-02-12 ENCOUNTER — CLINICAL SUPPORT (OUTPATIENT)
Dept: REHABILITATION | Facility: HOSPITAL | Age: 74
End: 2024-02-12
Payer: MEDICARE

## 2024-02-12 DIAGNOSIS — M25.511 ACUTE PAIN OF RIGHT SHOULDER: Primary | ICD-10-CM

## 2024-02-12 PROCEDURE — 97014 ELECTRIC STIMULATION THERAPY: CPT | Mod: CQ

## 2024-02-12 PROCEDURE — 97110 THERAPEUTIC EXERCISES: CPT | Mod: CQ

## 2024-02-12 PROCEDURE — 97010 HOT OR COLD PACKS THERAPY: CPT | Mod: CQ

## 2024-02-12 NOTE — PROGRESS NOTES
"[facility]   Physical Therapy Treatment Note      Name: Anoop Harkins Sr.  Clinic Number: [id number]    Visit Date: 2/12/2024  Therapy Diagnosis:        Encounter Diagnoses   Name Primary?    S/P right rotator cuff repair      Acute pain of right shoulder Yes        Physician: Keven Vazquez Jr., *     Physician Orders: PT Eval and Treat   Medical Diagnosis from Referral: R rotator cuff repair and subacromial debridement   Evaluation Date: 1/19/2024  Authorization Period Expiration: 1/18/2025  Plan of Care Expiration: 3/1/2024  Progress Note Due: 3/1/2024  Date of Surgery: 1/18/2024  Visit # / Visits authorized: 11/12   FOTO: to be completed on visit 12     Precautions: Standard      Time In:  8:02  Time Out: 8:48  Total Billable Time: 46 minutes     PTA Visit #: 4/5  Subjective   Patient reports: "It's been much easier with this shoulder".     He was compliant with home exercise program.  Response to previous treatment: No adverse effects reported   Functional change:  early in plan of care     Pain: 1/10 with activity   Location: right shoulder    Objective    Objective Measures updated at progress report unless specified.   Treatment   Eloi received the treatments listed below:      therapeutic exercises to develop strength, endurance, ROM, and posture.  see flowsheet below      Ther-Ex Reps          Pulleys flexion  4 minutes    Seated table slides flexion  10 x 10"    Seated ball rolls  10 x 10"    Pendulums circles  20 x each   Pendulums flexion/ext 20 x each    Wrist flexion and extension  20 x each    Radial and Ulnar Deviation  20 x each    Hand gripper  30 x green    Pronation and Supination  20 x    Scap Retractions 20 x 3"        Isometrics     Shoulder Flexion  20 x 3"   Internal and External Rotation 20 x 3"    Shoulder Abduction  20 x 3"    Elbow Flexion  20 x 3"    Shoulder extension  20 x 3"                            R shoulder Passive range of motion  Completed        "            STM/MFR to decrease trigger points and muscle tightness in Right Rhomboid and Middle Trap follow by scapula mobs.     supervised modalities after being cleared for contradictions: IFC Electrical Stimulation:  Anoop received IFC Electrical Stimulation for pain control x 10 minutes applied to the Right shoulder.  Anoop tolderated treatment well without any adverse effects.      cold pack for 10 minutes to Right shoulder with IFC.   Patient Education and Home Exercises       Education provided:   - Plan of Care, Delayed onset muscle soreness, Home exercise program     Written Home Exercises Provided: Patient instructed to cont prior HEP. Exercises were reviewed and Elio was able to demonstrate them prior to the end of the session.  Elio demonstrated fair  understanding of the education provided. See Electronic Medical Record under Patient Instructions for exercises provided during therapy sessions    Assessment     Anoop is a 73 y.o. male referred to outpatient Physical Therapy with a medical diagnosis of R rotator cuff repair and subacromial debridement. Patient presents with R shoulder pain, decreased R shoulder ROM, decreased R UE muscle strength and motor control. Patient's impairments are currently limiting his use of R UE for ADLs and patient is R hand dominant.  Pt displays improved carryover of exercises with proper alignmnet, speed of movement, count, and hold times.  Pt displays improved range of motion in all directions. Passive range of motion completed within surgeon protocol with no c/o pain. No adverse effects noted.    Patient's prognosis is good.   Patient will continue to benefit from skilled outpatient physical therapy to address the deficits listed in the problem list box on initial evaluation, provide pt/family education and to maximize pt's level of independence in the home and community environment.     Patient's spiritual, cultural and educational needs considered and pt agreeable  to plan of care and goals.     Anticipated barriers to physical therapy:  compliance with Home exercise program and post surgical restrictions     Goals:   Short Term Goals: 3 weeks   Patient will independently complete Home exercise program with correct form.   Patient will report R shoulder pain less than or equal to 3/10 for improved quality of life.      Long Term Goals: 6 weeks   Pt will increase R shoulder flexion to 120 degrees, external rotation to C4, and internal rotation to L2 for independent dressing  Pt will increase R upper extremity to 4/5 to allow patient to perform activities of daily living independently  Pt will report decrease in pain to 1/10 to improve quality of life.  Pt will place 5 pound object in overhead shelf without hike and with less than or equal to 2/10 pain to allow patient to return to prior level of function   Patient will have increased FOTO score to greater than or equal to 65% for improved function.     Plan   Plan of care Certification: 1/19/2024 to 3/1/2024.     Outpatient Physical Therapy 3 times weekly for 6 weeks to include the following interventions: Electrical Stimulation unattended, Manual Therapy, Moist Heat/ Ice, Neuromuscular Re-ed, Patient Education, Therapeutic Activities, Therapeutic Exercise, Ultrasound, and Vasopneumatic device.     Continue Plan of Care per PT order to progress patient toward rehab goals as tolerated by patient.   AUSTYN Canela   2/12/2024

## 2024-02-14 ENCOUNTER — CLINICAL SUPPORT (OUTPATIENT)
Dept: REHABILITATION | Facility: HOSPITAL | Age: 74
End: 2024-02-14
Payer: MEDICARE

## 2024-02-14 DIAGNOSIS — M25.511 ACUTE PAIN OF RIGHT SHOULDER: Primary | ICD-10-CM

## 2024-02-14 PROCEDURE — 97014 ELECTRIC STIMULATION THERAPY: CPT | Mod: CQ

## 2024-02-14 PROCEDURE — 97110 THERAPEUTIC EXERCISES: CPT | Mod: CQ

## 2024-02-14 NOTE — PROGRESS NOTES
"[facility]   Physical Therapy Treatment Note      Name: Anoop Harkins Sr.  Clinic Number: [id number]    Visit Date: 2/14/2024  Therapy Diagnosis:        Encounter Diagnoses   Name Primary?    S/P right rotator cuff repair      Acute pain of right shoulder Yes        Physician: Keven Vazquez Jr., *     Physician Orders: PT Eval and Treat   Medical Diagnosis from Referral: R rotator cuff repair and subacromial debridement   Evaluation Date: 1/19/2024  Authorization Period Expiration: 1/18/2025  Plan of Care Expiration: 3/1/2024  Progress Note Due: 3/1/2024  Date of Surgery: 1/18/2024  Visit # / Visits authorized: 12/18   FOTO: to be completed on visit 18     Precautions: Standard      Time In:  8:02  Time Out: 8:50  Total Billable Time: 48 minutes     PTA Visit #: 5/5  Subjective   Patient reports: "I just have a little discomfort under my right shoulder blade".     He was compliant with home exercise program.  Response to previous treatment: No adverse effects reported   Functional change:  early in plan of care     Pain: 1/10 with activity   Location: right shoulder    Objective    Objective Measures updated at progress report unless specified.   Treatment   Elio received the treatments listed below:      therapeutic exercises to develop strength, endurance, ROM, and posture.  see flowsheet below      Ther-Ex Reps          Pulleys flexion  4 minutes    Seated table slides flexion  10 x 10"    Seated ball rolls  10 x 10"    Pendulums circles  20 x each   Pendulums flexion/ext 20 x each    Wrist flexion and extension  20 x each    Radial and Ulnar Deviation  20 x each    Hand gripper  30 x green    Pronation and Supination  20 x    Scap Retractions 20 x 3"        Isometrics     Shoulder Flexion  20 x 3"   Internal and External Rotation 20 x 3"    Shoulder Abduction  20 x 3"    Elbow Flexion  20 x 3"    Shoulder extension  20 x 3"                            R shoulder Passive range of " motion  Completed                   NOT COMPLETED---STM/MFR to decrease trigger points and muscle tightness in Right Rhomboid and Middle Trap follow by scapula mobs.     supervised modalities after being cleared for contradictions: IFC Electrical Stimulation:  Anoop received IFC Electrical Stimulation for pain control x 10 minutes applied to the Right shoulder.  Anoop tolderated treatment well without any adverse effects.      cold pack for 10 minutes to Right shoulder with IFC.   Patient Education and Home Exercises       Education provided:   - Plan of Care, Delayed onset muscle soreness, Home exercise program     Written Home Exercises Provided: Patient instructed to cont prior HEP. Exercises were reviewed and Elio was able to demonstrate them prior to the end of the session.  Elio demonstrated fair  understanding of the education provided. See Electronic Medical Record under Patient Instructions for exercises provided during therapy sessions    Assessment     Anoop is a 73 y.o. male referred to outpatient Physical Therapy with a medical diagnosis of R rotator cuff repair and subacromial debridement. Patient presents with R shoulder pain, decreased R shoulder ROM, decreased R UE muscle strength and motor control. Patient's impairments are currently limiting his use of R UE for ADLs and patient is R hand dominant.  Pt continues to display improved carryover of exercises with proper alignmnet, speed of movement, count, and hold times.  Pt displays improved range of motion in all directions. Passive range of motion completed within surgeon protocol with no c/o pain. No adverse effects noted.    Patient's prognosis is good.   Patient will continue to benefit from skilled outpatient physical therapy to address the deficits listed in the problem list box on initial evaluation, provide pt/family education and to maximize pt's level of independence in the home and community environment.     Patient's spiritual,  cultural and educational needs considered and pt agreeable to plan of care and goals.     Anticipated barriers to physical therapy:  compliance with Home exercise program and post surgical restrictions     Goals:   Short Term Goals: 3 weeks   Patient will independently complete Home exercise program with correct form.   Patient will report R shoulder pain less than or equal to 3/10 for improved quality of life.      Long Term Goals: 6 weeks   Pt will increase R shoulder flexion to 120 degrees, external rotation to C4, and internal rotation to L2 for independent dressing  Pt will increase R upper extremity to 4/5 to allow patient to perform activities of daily living independently  Pt will report decrease in pain to 1/10 to improve quality of life.  Pt will place 5 pound object in overhead shelf without hike and with less than or equal to 2/10 pain to allow patient to return to prior level of function   Patient will have increased FOTO score to greater than or equal to 65% for improved function.     Plan   Plan of care Certification: 1/19/2024 to 3/1/2024.     Outpatient Physical Therapy 3 times weekly for 6 weeks to include the following interventions: Electrical Stimulation unattended, Manual Therapy, Moist Heat/ Ice, Neuromuscular Re-ed, Patient Education, Therapeutic Activities, Therapeutic Exercise, Ultrasound, and Vasopneumatic device.     Continue Plan of Care per PT order to progress patient toward rehab goals as tolerated by patient.   AUSTYN Canela   2/14/2024

## 2024-02-16 ENCOUNTER — CLINICAL SUPPORT (OUTPATIENT)
Dept: REHABILITATION | Facility: HOSPITAL | Age: 74
End: 2024-02-16
Payer: MEDICARE

## 2024-02-16 DIAGNOSIS — M25.511 ACUTE PAIN OF RIGHT SHOULDER: Primary | ICD-10-CM

## 2024-02-16 PROCEDURE — 97110 THERAPEUTIC EXERCISES: CPT

## 2024-02-16 PROCEDURE — 97014 ELECTRIC STIMULATION THERAPY: CPT

## 2024-02-16 NOTE — PROGRESS NOTES
"[facility]   Physical Therapy Treatment Note      Name: Anoop Harkins Sr.  Clinic Number: [id number]    Visit Date: 2/16/2024  Therapy Diagnosis:        Encounter Diagnoses   Name Primary?    S/P right rotator cuff repair      Acute pain of right shoulder Yes        Physician: Keven Vazquez Jr., *     Physician Orders: PT Eval and Treat   Medical Diagnosis from Referral: R rotator cuff repair and subacromial debridement   Evaluation Date: 1/19/2024  Authorization Period Expiration: 1/18/2025  Plan of Care Expiration: 3/1/2024  Progress Note Due: 3/1/2024  Date of Surgery: 1/18/2024  Visit # / Visits authorized: 12/18   FOTO: to be completed on visit 18     Precautions: Standard      Time In:  8:00 (later check in time)   Time Out: 9:01  Total Billable Time: 61 minutes     PTA Visit #: 0/5  Subjective   Patient reports: "I'm doing good."     He was compliant with home exercise program.  Response to previous treatment: No adverse effects reported   Functional change:  early in plan of care     Pain: 1/10 with activity   Location: right shoulder    Objective    Objective Measures updated at progress report unless specified.   Treatment   Elio received the treatments listed below:      therapeutic exercises to develop strength, endurance, ROM, and posture.  see flowsheet below      Ther-Ex Reps          Pulleys flexion  4 minutes    Seated table slides flexion  10 x 10"    Seated ball rolls  10 x 10"    Pendulums circles  20 x each   Pendulums flexion/ext 20 x each    Wrist flexion and extension  20 x each    Radial and Ulnar Deviation  20 x each    Hand gripper  30 x green    Pronation and Supination  20 x    Scap Retractions 20 x 3"        Isometrics     Shoulder Flexion  20 x 3"   Internal and External Rotation 20 x 3"    Shoulder Abduction  20 x 3"    Elbow Flexion  20 x 3"    Shoulder extension  20 x 3"                            R shoulder Passive range of motion  Completed            "          supervised modalities after being cleared for contradictions: IFC Electrical Stimulation:  Anoop received IFC Electrical Stimulation for pain control x 10 minutes applied to the Right shoulder.  Anoop tolderated treatment well without any adverse effects.      cold pack for 10 minutes to Right shoulder with IFC.   Patient Education and Home Exercises       Education provided:   - Plan of Care, Delayed onset muscle soreness, Home exercise program     Written Home Exercises Provided: Patient instructed to cont prior HEP. Exercises were reviewed and Elio was able to demonstrate them prior to the end of the session.  Elio demonstrated fair  understanding of the education provided. See Electronic Medical Record under Patient Instructions for exercises provided during therapy sessions    Assessment     Anoop is a 73 y.o. male referred to outpatient Physical Therapy with a medical diagnosis of R rotator cuff repair and subacromial debridement. Patient presents with R shoulder pain, decreased R shoulder ROM, decreased R UE muscle strength and motor control. Patient's impairments are currently limiting his use of R UE for ADLs and patient is R hand dominant.  Patient demonstrates good carryover of exercise instruction. He has no report of increased pain with exercise. PT noted continued guarding and limited R shoulder flexion and internal rotation due to tissue tightness. Patient is progressing well toward rehab goals.    Patient's prognosis is good.   Patient will continue to benefit from skilled outpatient physical therapy to address the deficits listed in the problem list box on initial evaluation, provide pt/family education and to maximize pt's level of independence in the home and community environment.     Patient's spiritual, cultural and educational needs considered and pt agreeable to plan of care and goals.     Anticipated barriers to physical therapy:  compliance with Home exercise program and post  surgical restrictions     Goals:   Short Term Goals: 3 weeks   Patient will independently complete Home exercise program with correct form.   Patient will report R shoulder pain less than or equal to 3/10 for improved quality of life.      Long Term Goals: 6 weeks   Pt will increase R shoulder flexion to 120 degrees, external rotation to C4, and internal rotation to L2 for independent dressing  Pt will increase R upper extremity to 4/5 to allow patient to perform activities of daily living independently  Pt will report decrease in pain to 1/10 to improve quality of life.  Pt will place 5 pound object in overhead shelf without hike and with less than or equal to 2/10 pain to allow patient to return to prior level of function   Patient will have increased FOTO score to greater than or equal to 65% for improved function.     Plan   Plan of care Certification: 1/19/2024 to 3/1/2024.     Outpatient Physical Therapy 3 times weekly for 6 weeks to include the following interventions: Electrical Stimulation unattended, Manual Therapy, Moist Heat/ Ice, Neuromuscular Re-ed, Patient Education, Therapeutic Activities, Therapeutic Exercise, Ultrasound, and Vasopneumatic device.     Continue Plan of Care per PT order to progress patient toward rehab goals as tolerated by patient.   Mickey Bustillos, PT, DPT     2/16/2024

## 2024-02-19 ENCOUNTER — CLINICAL SUPPORT (OUTPATIENT)
Dept: REHABILITATION | Facility: HOSPITAL | Age: 74
End: 2024-02-19
Payer: MEDICARE

## 2024-02-19 DIAGNOSIS — M25.511 ACUTE PAIN OF RIGHT SHOULDER: Primary | ICD-10-CM

## 2024-02-19 PROCEDURE — 97110 THERAPEUTIC EXERCISES: CPT | Mod: CQ

## 2024-02-19 PROCEDURE — 97530 THERAPEUTIC ACTIVITIES: CPT | Mod: CQ

## 2024-02-19 PROCEDURE — 97014 ELECTRIC STIMULATION THERAPY: CPT | Mod: CQ

## 2024-02-19 NOTE — PROGRESS NOTES
"[facility]   Physical Therapy Treatment Note      Name: Anoop Harkins Sr.  Clinic Number: [id number]    Visit Date: 2/19/2024  Therapy Diagnosis:        Encounter Diagnoses   Name Primary?    S/P right rotator cuff repair      Acute pain of right shoulder Yes        Physician: Keven Vazquez Jr., *     Physician Orders: PT Eval and Treat   Medical Diagnosis from Referral: R rotator cuff repair and subacromial debridement   Evaluation Date: 1/19/2024  Authorization Period Expiration: 1/18/2025  Plan of Care Expiration: 3/1/2024  Progress Note Due: 3/1/2024  Date of Surgery: 1/18/2024  Visit # / Visits authorized: 14/18   FOTO: to be completed on visit 18     Precautions: Standard      Time In:  8:01 (remote check-in)  Time Out: 8:55  Total Billable Time: 54 minutes     PTA Visit #: 1/5  Subjective   Patient reports: "I'm doing good."     He was compliant with home exercise program.  Response to previous treatment: No adverse effects reported   Functional change:  begin active range of motion     Pain: 1/10 with activity   Location: right shoulder    Objective    Objective Measures updated at progress report unless specified.   Treatment   Elio received the treatments listed below:      therapeutic exercises to develop strength, endurance, ROM, and posture.  see flowsheet below      Ther-Ex Reps          Pulleys flexion  4 minutes    Seated table slides flexion  10 x 10"    Seated ball rolls  10 x 10"    Pendulums circles  20 x each   Pendulums flexion/ext 20 x each    Wrist flexion and extension  20 x each    Radial and Ulnar Deviation  20 x each    Hand gripper  30 x green    Pronation and Supination  20 x    Scap Retractions 20 x 3"    Finger Ladder 5 x 10" *   Cane flexion stretch 5 x 10" *   Cane ER stretch 5 x 10" *   Isometrics     I,Y,T's 10 x each *   Shoulder Flexion  20 x 3"   Internal and External Rotation 20 x 3"    Shoulder Abduction  20 x 3"    Elbow Flexion  20 x 3"  " "  Shoulder extension  20 x 3"                            R shoulder Passive range of motion  Completed                     supervised modalities after being cleared for contradictions: IFC Electrical Stimulation:  Anoop received IFC Electrical Stimulation for pain control x 10 minutes applied to the Right shoulder.  Anoop tolderated treatment well without any adverse effects.      cold pack for 10 minutes to Right shoulder with IFC.   Patient Education and Home Exercises       Education provided:   - Plan of Care, Delayed onset muscle soreness, Home exercise program     Written Home Exercises Provided: Patient instructed to cont prior HEP. Exercises were reviewed and Elio was able to demonstrate them prior to the end of the session.  Elio demonstrated fair  understanding of the education provided. See Electronic Medical Record under Patient Instructions for exercises provided during therapy sessions    Assessment     Anoop is a 73 y.o. male referred to outpatient Physical Therapy with a medical diagnosis of R rotator cuff repair and subacromial debridement. Patient presents with R shoulder pain, decreased R shoulder ROM, decreased R UE muscle strength and motor control. Patient's impairments are currently limiting his use of R UE for ADLs and patient is R hand dominant. LPTA increased tx intensity according to pt tolerance. Pt progressed through active range of motion exercises with improved strength and no c/o pain.  Pt reports minimal pain with exercise. No adverse effects noted. Patient is progressing well toward rehab goals.    Patient's prognosis is good.   Patient will continue to benefit from skilled outpatient physical therapy to address the deficits listed in the problem list box on initial evaluation, provide pt/family education and to maximize pt's level of independence in the home and community environment.     Patient's spiritual, cultural and educational needs considered and pt agreeable to plan of " care and goals.     Anticipated barriers to physical therapy:  compliance with Home exercise program and post surgical restrictions     Goals:   Short Term Goals: 3 weeks   Patient will independently complete Home exercise program with correct form.   Patient will report R shoulder pain less than or equal to 3/10 for improved quality of life.      Long Term Goals: 6 weeks   Pt will increase R shoulder flexion to 120 degrees, external rotation to C4, and internal rotation to L2 for independent dressing  Pt will increase R upper extremity to 4/5 to allow patient to perform activities of daily living independently  Pt will report decrease in pain to 1/10 to improve quality of life.  Pt will place 5 pound object in overhead shelf without hike and with less than or equal to 2/10 pain to allow patient to return to prior level of function   Patient will have increased FOTO score to greater than or equal to 65% for improved function.     Plan   Plan of care Certification: 1/19/2024 to 3/1/2024.     Outpatient Physical Therapy 3 times weekly for 6 weeks to include the following interventions: Electrical Stimulation unattended, Manual Therapy, Moist Heat/ Ice, Neuromuscular Re-ed, Patient Education, Therapeutic Activities, Therapeutic Exercise, Ultrasound, and Vasopneumatic device.     Continue Plan of Care per PT order to progress patient toward rehab goals as tolerated by patient.   AUSTYN Canela  2/19/2024

## 2024-02-21 ENCOUNTER — CLINICAL SUPPORT (OUTPATIENT)
Dept: REHABILITATION | Facility: HOSPITAL | Age: 74
End: 2024-02-21
Payer: MEDICARE

## 2024-02-21 DIAGNOSIS — M25.511 ACUTE PAIN OF RIGHT SHOULDER: Primary | ICD-10-CM

## 2024-02-21 PROCEDURE — 97110 THERAPEUTIC EXERCISES: CPT | Mod: CQ

## 2024-02-21 PROCEDURE — 97530 THERAPEUTIC ACTIVITIES: CPT | Mod: CQ

## 2024-02-21 PROCEDURE — 97014 ELECTRIC STIMULATION THERAPY: CPT | Mod: CQ

## 2024-02-21 NOTE — PROGRESS NOTES
"[facility]   Physical Therapy Treatment Note      Name: Anoop Harkins Sr.  Clinic Number: [id number]    Visit Date: 2/21/2024  Therapy Diagnosis:        Encounter Diagnoses   Name Primary?    S/P right rotator cuff repair      Acute pain of right shoulder Yes        Physician: Keven Vazquez Jr., *     Physician Orders: PT Eval and Treat   Medical Diagnosis from Referral: R rotator cuff repair and subacromial debridement   Evaluation Date: 1/19/2024  Authorization Period Expiration: 1/18/2025  Plan of Care Expiration: 3/1/2024  Progress Note Due: 3/1/2024  Date of Surgery: 1/18/2024  Visit # / Visits authorized: 15/18   FOTO: to be completed on visit 18     Precautions: Standard      Time In:  8:05  Time Out: 8:53  Total Billable Time: 48 minutes     PTA Visit #: 2/5  Subjective   Patient reports: "I'm doing good.  My shoulder isn't giving me any problems really as far as pain goes.  It's mostly just sore".  Patient verbalizes and demonstrates compliance with use of shoulder sling.     He was compliant with home exercise program.  Response to previous treatment: No adverse effects reported   Functional change:  begin active range of motion     Pain: 1/10 with activity   Location: right shoulder    Objective    Objective Measures updated at progress report unless specified.   Treatment   Elio received the treatments listed below:      therapeutic exercises to develop strength, endurance, ROM, and posture.  see flowsheet below      Ther-Ex Reps          Pulleys flexion  4 minutes    Seated table slides flexion  10 x 10"    Seated ball rolls  10 x 10"    Pendulums circles  20 x each   Pendulums flexion/ext 20 x each    Wrist flexion and extension  20 x each    Radial and Ulnar Deviation  20 x each    Hand gripper  30 x green    Pronation and Supination  20 x    Scap Retractions 20 x 3"    Finger Ladder 5 x 10" *   Cane flexion stretch 5 x 10" *   Cane ER stretch 5 x 10" *   Isometrics   " "  I,Y,T's 10 x each *   Shoulder Flexion  20 x 3"   Internal and External Rotation 20 x 3"    Shoulder Abduction  20 x 3"    Elbow Flexion  20 x 3"    Shoulder extension  20 x 3"                            R shoulder Passive range of motion  Completed                     supervised modalities after being cleared for contradictions: IFC Electrical Stimulation:  Anoop received IFC Electrical Stimulation for pain control x 10 minutes applied to the Right shoulder.  Anoop tolderated treatment well without any adverse effects.      cold pack for 10 minutes to Right shoulder with IFC.   Patient Education and Home Exercises       Education provided:   - Plan of Care, Delayed onset muscle soreness, Home exercise program     Written Home Exercises Provided: Patient instructed to cont prior HEP. Exercises were reviewed and Elio was able to demonstrate them prior to the end of the session.  Elio demonstrated fair  understanding of the education provided. See Electronic Medical Record under Patient Instructions for exercises provided during therapy sessions    Assessment     Anoop is a 73 y.o. male referred to outpatient Physical Therapy with a medical diagnosis of R rotator cuff repair and subacromial debridement. Patient presents with R shoulder pain, decreased R shoulder ROM, decreased R UE muscle strength and motor control. Patient's impairments are currently limiting his use of R UE for ADLs and patient is R hand dominant. LPTA added Therapeutic exercises and Therapeutic activities as tolerated by patient per surgeon's protocol.  Patient's Right shoulder ROM within protocol measures.  Patient does not report or demonstrate increased pain at end of treatment session.      Patient's prognosis is good.   Patient will continue to benefit from skilled outpatient physical therapy to address the deficits listed in the problem list box on initial evaluation, provide pt/family education and to maximize pt's level of independence " in the home and community environment.     Patient's spiritual, cultural and educational needs considered and pt agreeable to plan of care and goals.     Anticipated barriers to physical therapy:  compliance with Home exercise program and post surgical restrictions     Goals:   Short Term Goals: 3 weeks   Patient will independently complete Home exercise program with correct form.   Patient will report R shoulder pain less than or equal to 3/10 for improved quality of life.      Long Term Goals: 6 weeks   Pt will increase R shoulder flexion to 120 degrees, external rotation to C4, and internal rotation to L2 for independent dressing  Pt will increase R upper extremity to 4/5 to allow patient to perform activities of daily living independently  Pt will report decrease in pain to 1/10 to improve quality of life.  Pt will place 5 pound object in overhead shelf without hike and with less than or equal to 2/10 pain to allow patient to return to prior level of function   Patient will have increased FOTO score to greater than or equal to 65% for improved function.     Plan   Plan of care Certification: 1/19/2024 to 3/1/2024.     Outpatient Physical Therapy 3 times weekly for 6 weeks to include the following interventions: Electrical Stimulation unattended, Manual Therapy, Moist Heat/ Ice, Neuromuscular Re-ed, Patient Education, Therapeutic Activities, Therapeutic Exercise, Ultrasound, and Vasopneumatic device.     Continue Plan of Care per PT order to progress patient toward rehab goals as tolerated by patient.   AUSTYN Ortiz   2/21/2024

## 2024-02-23 ENCOUNTER — CLINICAL SUPPORT (OUTPATIENT)
Dept: REHABILITATION | Facility: HOSPITAL | Age: 74
End: 2024-02-23
Payer: MEDICARE

## 2024-02-23 DIAGNOSIS — M25.511 ACUTE PAIN OF RIGHT SHOULDER: Primary | ICD-10-CM

## 2024-02-23 PROCEDURE — 97140 MANUAL THERAPY 1/> REGIONS: CPT

## 2024-02-23 PROCEDURE — 97110 THERAPEUTIC EXERCISES: CPT

## 2024-02-23 PROCEDURE — 97014 ELECTRIC STIMULATION THERAPY: CPT

## 2024-02-23 NOTE — PROGRESS NOTES
"[facility]   Physical Therapy Treatment Note      Name: Anoop Harkins Sr.  Clinic Number: [id number]    Visit Date: 2/23/2024  Therapy Diagnosis:        Encounter Diagnoses   Name Primary?    S/P right rotator cuff repair      Acute pain of right shoulder Yes        Physician: Keven Vazquez Jr., *     Physician Orders: PT Eval and Treat   Medical Diagnosis from Referral: R rotator cuff repair and subacromial debridement   Evaluation Date: 1/19/2024  Authorization Period Expiration: 1/18/2025  Plan of Care Expiration: 3/1/2024  Progress Note Due: 3/1/2024  Date of Surgery: 1/18/2024  Visit # / Visits authorized: 16/18   FOTO: to be completed on visit 18     Precautions: Standard      Time In:  7:45   Time Out: 8:51  Total Billable Time: 66 minutes     PTA Visit #: 0/5  Subjective   Patient reports:"I have hurt worse yesterday and last night more than I have since surgery." He describes pain in R upper trapezius and deltoid that he states is like muscle spasms that come and go.     He was compliant with home exercise program.  Response to previous treatment: No adverse effects reported   Functional change:  begin active range of motion     Pain: 1/10 at rest and 5/10 during spasms  Location: right shoulder    Objective    Objective Measures updated at progress report unless specified.   Treatment   Elio received the treatments listed below:      therapeutic exercises to develop strength, endurance, ROM, and posture.  see flowsheet below      Ther-Ex Reps          Pulleys flexion/abduction 4 minutes each    Seated table slides flexion  10 x 10" (not completed)   Seated ball rolls flexion/abduction 10 x 10" each    Pendulums circles  20 x each (not completed)   Pendulums flexion/ext 20 x each (not completed)   Wrist flexion and extension  20 x each (not completed    Radial and Ulnar Deviation  20 x each (not completed)   Hand gripper  30 x green (not completed)   Pronation and Supination  " "20 x    Scap Retractions 20 x 3"    Finger Ladder 5 x 10" * (not completed)   Cane flexion stretch 5 x 10" * (not completed)   Cane ER stretch 5 x 10" * (not completed)        I,Y,T's 10 x each * I and Y only    Isometrics  (Not completed)   Shoulder Flexion  20 x 3"   Internal and External Rotation 20 x 3"    Shoulder Abduction  20 x 3"    Elbow Flexion  20 x 3"    Shoulder extension  20 x 3"                            R shoulder Passive range of motion  Completed                   Manual Therapy: PT completed soft tissue mobilization, active neuromuscular releases and joint mobilizations to R upper trapezius, levator scapulae, and R first rib to decrease first rib elevation, tissue tightness, and pain.     supervised modalities after being cleared for contradictions: IFC Electrical Stimulation:  Anoop received  BIPHASIC Electrical Stimulation for tissue tightness and irritation x 10 minutes applied to the Right pectoralis, upper trapezius, rhomboid and deltoid.  Anoop tolderated treatment well without any adverse effects.      cold pack for 10 minutes to Right shoulder with ESTIM    ESTIM/US Combo to R upper trapezius/levator scapulae for 6 minutes to decrease trigger point and pain prior to tissue irritation.     Patient Education and Home Exercises       Education provided:   - Plan of Care, Delayed onset muscle soreness, Home exercise program     Written Home Exercises Provided: Patient instructed to cont prior HEP. Exercises were reviewed and Leio was able to demonstrate them prior to the end of the session.  Elio demonstrated fair  understanding of the education provided. See Electronic Medical Record under Patient Instructions for exercises provided during therapy sessions    Assessment     Anoop is a 73 y.o. male referred to outpatient Physical Therapy with a medical diagnosis of R rotator cuff repair and subacromial debridement. Patient presents with R shoulder pain, decreased R shoulder ROM, decreased " "R UE muscle strength and motor control. Patient's impairments are currently limiting his use of R UE for ADLs and patient is R hand dominant. PT with held treatment tasks to address patient's increased pain. PT educated patient on how R shoulder compensations with newly added active ROM tasks can result in increased tissue tightness and irritation. PT provided verbal and visual cues throughout completed exercises to decrease R shoulder hiking and improve scapular stability. PT noted more compensations with scaption and abduction active range of motion resulting in PT holding on those exercises today. Patient reported pain into R deltoid when PT palpated trigger point in R upper trapezius. PT noted the trigger point resolved with ESTIM/US combo. PT completed first rib mobilizations on R side and noted moderately improved elevation. Patient limited in active neuromuscular releases due to R shoulder limitations. PT followed treatment with biphasic ESTIM and ice to decrease tissue soreness from manual therapy. PT educated patient on possibility of increased tissue soreness post manual treatment. Patient stated "It already feels a little better," at end of therapy session.     Patient's prognosis is good.   Patient will continue to benefit from skilled outpatient physical therapy to address the deficits listed in the problem list box on initial evaluation, provide pt/family education and to maximize pt's level of independence in the home and community environment.     Patient's spiritual, cultural and educational needs considered and pt agreeable to plan of care and goals.     Anticipated barriers to physical therapy:  compliance with Home exercise program and post surgical restrictions     Goals:   Short Term Goals: 3 weeks   Patient will independently complete Home exercise program with correct form.   Patient will report R shoulder pain less than or equal to 3/10 for improved quality of life.      Long Term Goals: 6 " weeks   Pt will increase R shoulder flexion to 120 degrees, external rotation to C4, and internal rotation to L2 for independent dressing  Pt will increase R upper extremity to 4/5 to allow patient to perform activities of daily living independently  Pt will report decrease in pain to 1/10 to improve quality of life.  Pt will place 5 pound object in overhead shelf without hike and with less than or equal to 2/10 pain to allow patient to return to prior level of function   Patient will have increased FOTO score to greater than or equal to 65% for improved function.     Plan   Plan of care Certification: 1/19/2024 to 3/1/2024.     Outpatient Physical Therapy 3 times weekly for 6 weeks to include the following interventions: Electrical Stimulation unattended, Manual Therapy, Moist Heat/ Ice, Neuromuscular Re-ed, Patient Education, Therapeutic Activities, Therapeutic Exercise, Ultrasound, and Vasopneumatic device.     Continue Plan of Care per PT order to progress patient toward rehab goals as tolerated by patient.   Mickey Bustillos, PT, DPT     2/23/2024

## 2024-02-26 ENCOUNTER — CLINICAL SUPPORT (OUTPATIENT)
Dept: REHABILITATION | Facility: HOSPITAL | Age: 74
End: 2024-02-26
Payer: MEDICARE

## 2024-02-26 DIAGNOSIS — M25.511 ACUTE PAIN OF RIGHT SHOULDER: Primary | ICD-10-CM

## 2024-02-26 PROCEDURE — 97014 ELECTRIC STIMULATION THERAPY: CPT

## 2024-02-26 PROCEDURE — 97110 THERAPEUTIC EXERCISES: CPT

## 2024-02-26 PROCEDURE — 97140 MANUAL THERAPY 1/> REGIONS: CPT

## 2024-02-26 NOTE — PROGRESS NOTES
"[facility]   Physical Therapy Treatment Note      Name: Anoop Harkins Sr.  Clinic Number: [id number]    Visit Date: 2/26/2024  Therapy Diagnosis:        Encounter Diagnoses   Name Primary?    S/P right rotator cuff repair      Acute pain of right shoulder Yes        Physician: Keven Vazquez Jr., *     Physician Orders: PT Eval and Treat   Medical Diagnosis from Referral: R rotator cuff repair and subacromial debridement   Evaluation Date: 1/19/2024  Authorization Period Expiration: 1/18/2025  Plan of Care Expiration: 3/1/2024  Progress Note Due: 3/1/2024  Date of Surgery: 1/18/2024  Visit # / Visits authorized: 17/18   FOTO: to be completed on visit 18     Precautions: Standard      Time In:  7:55 (later check in time)   Time Out: 9:07  Total Billable Time: 72 minutes     PTA Visit #: 0/5  Subjective   Patient reports:"I feel much better than I did on Friday. I have only had that sensation two or three times since then."     He was compliant with home exercise program.  Response to previous treatment: No adverse effects reported   Functional change:  begin active range of motion     Pain: 1/10 at rest   Location: right shoulder    Objective    Objective Measures updated at progress report unless specified.   Treatment   Elio received the treatments listed below:      therapeutic exercises to develop strength, endurance, ROM, and posture.  see flowsheet below      Ther-Ex Reps          Pulleys flexion/abduction 4 minutes each    Seated ball rolls flexion/abduction 10 x 10" each    Pendulums circles  20 x each (not completed)   Pendulums flexion/ext 20 x each (not completed)   Wrist flexion and extension  20 x each    Radial and Ulnar Deviation  20 x each    Hand gripper  30 x blue *   Pronation and Supination  20 x    Scap Retractions 30 x 3"    Finger Ladder 5 x 10"    Cane flexion stretch 5 x 10"    Cane ER stretch 5 x 10"         I,Y,T's 10 x each    Isometrics     Shoulder Flexion  " "20 x 3"   Internal and External Rotation 20 x 3"    Shoulder Abduction  20 x 3"    Elbow Flexion  20 x 3"    Shoulder extension  20 x 3"                            R shoulder Passive range of motion  Completed                   Manual Therapy: PT completed glenohumeral joint mobilization and gentle distraction/oscillations prior to manual stretching to increase R shoulder ROM.     supervised modalities after being cleared for contradictions: IFC Electrical Stimulation:  Anoop received  Electrical Stimulation for tissue tightness and irritation x 10 minutes applied to the Right pectoralis, upper trapezius, rhomboid and deltoid.  Anoop tolderated treatment well without any adverse effects.      cold pack for 10 minutes to Right shoulder with ESTIM      Patient Education and Home Exercises       Education provided:   - Plan of Care, Delayed onset muscle soreness, Home exercise program     Written Home Exercises Provided: Patient instructed to cont prior HEP. Exercises were reviewed and Elio was able to demonstrate them prior to the end of the session.  Elio demonstrated fair  understanding of the education provided. See Electronic Medical Record under Patient Instructions for exercises provided during therapy sessions    Assessment     Anoop is a 73 y.o. male referred to outpatient Physical Therapy with a medical diagnosis of R rotator cuff repair and subacromial debridement. Patient presents with R shoulder pain, decreased R shoulder ROM, decreased R UE muscle strength and motor control. Patient's impairments are currently limiting his use of R UE for ADLs and patient is R hand dominant. PT with held treatment tasks to address patient's increased pain. PT provided verbal and visual cueing throughout session for improved scapular retraction and improved scapular stability during active assisted and active ROM tasks. Patient demonstrated improved motor control and reported no pain with R shoulder active range of " motion exercises today compared to last treatment. Patient had no reports of increased pain only muscle fatigue at end of treatment.     Patient's prognosis is good.   Patient will continue to benefit from skilled outpatient physical therapy to address the deficits listed in the problem list box on initial evaluation, provide pt/family education and to maximize pt's level of independence in the home and community environment.     Patient's spiritual, cultural and educational needs considered and pt agreeable to plan of care and goals.     Anticipated barriers to physical therapy:  compliance with Home exercise program and post surgical restrictions     Goals:   Short Term Goals: 3 weeks   Patient will independently complete Home exercise program with correct form.   Patient will report R shoulder pain less than or equal to 3/10 for improved quality of life.      Long Term Goals: 6 weeks   Pt will increase R shoulder flexion to 120 degrees, external rotation to C4, and internal rotation to L2 for independent dressing  Pt will increase R upper extremity to 4/5 to allow patient to perform activities of daily living independently  Pt will report decrease in pain to 1/10 to improve quality of life.  Pt will place 5 pound object in overhead shelf without hike and with less than or equal to 2/10 pain to allow patient to return to prior level of function   Patient will have increased FOTO score to greater than or equal to 65% for improved function.     Plan   Plan of care Certification: 1/19/2024 to 3/1/2024.     Outpatient Physical Therapy 3 times weekly for 6 weeks to include the following interventions: Electrical Stimulation unattended, Manual Therapy, Moist Heat/ Ice, Neuromuscular Re-ed, Patient Education, Therapeutic Activities, Therapeutic Exercise, Ultrasound, and Vasopneumatic device.     Continue Plan of Care per PT order to progress patient toward rehab goals as tolerated by patient.   Mickey Bustillos, PT, DPT      2/26/2024

## 2024-02-28 ENCOUNTER — CLINICAL SUPPORT (OUTPATIENT)
Dept: REHABILITATION | Facility: HOSPITAL | Age: 74
End: 2024-02-28
Payer: MEDICARE

## 2024-02-28 DIAGNOSIS — M25.511 ACUTE PAIN OF RIGHT SHOULDER: Primary | ICD-10-CM

## 2024-02-28 PROCEDURE — 97140 MANUAL THERAPY 1/> REGIONS: CPT | Mod: KX,CQ

## 2024-02-28 PROCEDURE — 97110 THERAPEUTIC EXERCISES: CPT | Mod: KX,CQ

## 2024-02-28 PROCEDURE — 97530 THERAPEUTIC ACTIVITIES: CPT | Mod: KX,CQ

## 2024-02-28 PROCEDURE — 97014 ELECTRIC STIMULATION THERAPY: CPT | Mod: KX,CQ

## 2024-02-28 NOTE — PLAN OF CARE
[facility]  Physical Therapy Plan of Care Note     Name: Anoop Harkins Sr.  Clinic Number: [id number]    Therapy Diagnosis:   Encounter Diagnosis   Name Primary?    Acute pain of right shoulder Yes     Physician: Keven Vazquez Jr., *    Visit Date: 2/28/2024    Physician Orders: PT Eval and Treat   Medical Diagnosis from Referral: R rotator cuff repair and subacromial debridement   Evaluation Date: 1/19/2024  Authorization Period Expiration: 1/18/2025  Plan of Care Expiration: 4/10/2024  Progress Note Due: 4/10/2024  Date of Surgery: 1/18/2024  Visit # / Visits authorized: 18/36   FOTO: to be completed on visit 24    Precautions: Standard  Functional Level Prior to Evaluation:  Independent    Subjective     Update: Patient reports occasional R shoulder pain     Objective      Update: Patient demonstrates increasing R shoulder active range of motion and muscle strength.     Assessment     Update: Patient can benefit from continued skilled PT services     Previous Short Term Goals Status:     Patient will independently complete Home exercise program with correct form. -MET   Patient will report R shoulder pain less than or equal to 3/10 for improved quality of life. -MET   Long Term Goal Status: continue per initial plan of care.  Reasons for Recertification of Therapy:   Continue progressing toward rehab goals per MD post surgical protocol.     GOALS  Pt will increase R shoulder flexion to 120 degrees, external rotation to C4, and internal rotation to L2 for independent dressing-Goal in progress; 120 degrees flexion without compensations, ER to C2, IR PSIS functional reach  Pt will increase R upper extremity to 4/5 to allow patient to perform activities of daily living independently-Goal in progress  Pt will report decrease in pain to 1/10 to improve quality of life.-MET   Pt will place 5 pound object in overhead shelf without hike and with less than or equal to 2/10 pain to allow  patient to return to prior level of function -Goal in progress  Patient will have increased FOTO score to greater than or equal to 65% for improved function.-Goal in progress 55%     Plan     Updated Certification Period: 2/28/2024 to 4/12/2024   Recommended Treatment Plan: 3 times per week for 6 weeks:  Electrical Stimulation unattended, Manual Therapy, Moist Heat/ Ice, Patient Education, Therapeutic Activities, Therapeutic Exercise, and Ultrasound    Mickey Bustillos PT, DPT     Physician Signature : ____________________________________________________  Date:_______________________________________________

## 2024-02-28 NOTE — PROGRESS NOTES
"[facility]   Physical Therapy Treatment Note      Name: Anoop Harkins Sr.  Clinic Number: [id number]    Visit Date: 2/28/2024  Therapy Diagnosis:        Encounter Diagnoses   Name Primary?    S/P right rotator cuff repair      Acute pain of right shoulder Yes        Physician: Keven Vazquez Jr., *     Physician Orders: PT Eval and Treat   Medical Diagnosis from Referral: R rotator cuff repair and subacromial debridement   Evaluation Date: 1/19/2024  Authorization Period Expiration: 1/18/2025  Plan of Care Expiration: 4/10/2024  Progress Note Due: 4/10/2024  Date of Surgery: 1/18/2024  Visit # / Visits authorized: 18/36   FOTO: to be completed on visit 24     Precautions: Standard      Time In:  8:00   Time Out: 9:01  Total Billable Time: 61 minutes     PTA Visit #: 1/5  Subjective   Patient reports: "I'm not hurting at all."     He was compliant with home exercise program.  Response to previous treatment: No adverse effects reported   Functional change:  begin active range of motion     Pain: 1/10 at rest   Location: right shoulder    Objective    Objective Measures updated at progress report unless specified.   Treatment   Elio received the treatments listed below:      therapeutic exercises to develop strength, endurance, ROM, and posture.  see flowsheet below      Ther-Ex Reps          Pulleys flexion/abduction 4 minutes each    Seated ball rolls flexion/abduction 10 x 10" each    Pendulums circles  20 x each (not completed)   Pendulums flexion/ext 20 x each (not completed)   Wrist flexion and extension  20 x each    Radial and Ulnar Deviation  20 x each    Hand gripper  30 x blue    Pronation and Supination  20 x    Scap Retractions 30 x 3"    Finger Ladder 5 x 10"    Cane flexion stretch 5 x 10"    Cane ER stretch 5 x 10"         I,Y,T's 10 x each    Isometrics     Shoulder Flexion  20 x 3"   Internal and External Rotation 20 x 3"    Shoulder Abduction  20 x 3"    Elbow Flexion  20 " "x 3"    Shoulder extension  20 x 3"                            R shoulder Passive range of motion  Completed                   Manual Therapy: LPTA completed glenohumeral joint mobilization and gentle distraction/oscillations prior to manual stretching to increase R shoulder ROM.     supervised modalities after being cleared for contradictions: IFC Electrical Stimulation:  Anoop received  Electrical Stimulation for tissue tightness and irritation x 10 minutes applied to the Right pectoralis, upper trapezius, rhomboid and deltoid.  Anoop tolderated treatment well without any adverse effects.      cold pack for 10 minutes to Right shoulder with ESTIM      Patient Education and Home Exercises       Education provided:   - Plan of Care, Delayed onset muscle soreness, Home exercise program     Written Home Exercises Provided: Patient instructed to cont prior HEP. Exercises were reviewed and Elio was able to demonstrate them prior to the end of the session.  Elio demonstrated fair  understanding of the education provided. See Electronic Medical Record under Patient Instructions for exercises provided during therapy sessions    Assessment     Anoop is a 73 y.o. male referred to outpatient Physical Therapy with a medical diagnosis of R rotator cuff repair and subacromial debridement. Patient presents with R shoulder pain, decreased R shoulder ROM, decreased R UE muscle strength and motor control. Patient's impairments are currently limiting his use of R UE for ADLs and patient is R hand dominant. Pt presents and progressed through tx with no c/o increased pain today. Pt displays improved scapular retraction and stability compared to previous tx. Pt with improved range of motion and required less verbal cues to decrease muscle guarding. No adverse effects noted from tx.    Patient's prognosis is good.   Patient will continue to benefit from skilled outpatient physical therapy to address the deficits listed in the problem " list box on initial evaluation, provide pt/family education and to maximize pt's level of independence in the home and community environment.     Patient's spiritual, cultural and educational needs considered and pt agreeable to plan of care and goals.     Anticipated barriers to physical therapy:  compliance with Home exercise program and post surgical restrictions     Goals:   Short Term Goals: 3 weeks   Patient will independently complete Home exercise program with correct form.   Patient will report R shoulder pain less than or equal to 3/10 for improved quality of life.      Long Term Goals: 6 weeks   Pt will increase R shoulder flexion to 120 degrees, external rotation to C4, and internal rotation to L2 for independent dressing  Pt will increase R upper extremity to 4/5 to allow patient to perform activities of daily living independently  Pt will report decrease in pain to 1/10 to improve quality of life.  Pt will place 5 pound object in overhead shelf without hike and with less than or equal to 2/10 pain to allow patient to return to prior level of function   Patient will have increased FOTO score to greater than or equal to 65% for improved function.     Plan   Plan of care Certification: 1/19/2024 to 3/1/2024.     Outpatient Physical Therapy 3 times weekly for 6 weeks to include the following interventions: Electrical Stimulation unattended, Manual Therapy, Moist Heat/ Ice, Neuromuscular Re-ed, Patient Education, Therapeutic Activities, Therapeutic Exercise, Ultrasound, and Vasopneumatic device.     Continue Plan of Care per PT order to progress patient toward rehab goals as tolerated by patient.   AUSTYN Canela  2/28/2024

## 2024-03-04 ENCOUNTER — CLINICAL SUPPORT (OUTPATIENT)
Dept: REHABILITATION | Facility: HOSPITAL | Age: 74
End: 2024-03-04
Payer: MEDICARE

## 2024-03-04 DIAGNOSIS — M25.511 ACUTE PAIN OF RIGHT SHOULDER: Primary | ICD-10-CM

## 2024-03-04 PROCEDURE — 97530 THERAPEUTIC ACTIVITIES: CPT

## 2024-03-04 PROCEDURE — 97110 THERAPEUTIC EXERCISES: CPT

## 2024-03-04 PROCEDURE — 97014 ELECTRIC STIMULATION THERAPY: CPT

## 2024-03-04 NOTE — PROGRESS NOTES
"[facility]   Physical Therapy Treatment Note      Name: Anoop Harkins Sr.  Clinic Number: [id number]    Visit Date: 3/4/2024  Therapy Diagnosis:        Encounter Diagnoses   Name Primary?    S/P right rotator cuff repair      Acute pain of right shoulder Yes        Physician: Keven Vazquez Jr., *     Physician Orders: PT Eval and Treat   Medical Diagnosis from Referral: R rotator cuff repair and subacromial debridement   Evaluation Date: 1/19/2024  Authorization Period Expiration: 1/18/2025  Plan of Care Expiration: 4/10/2024  Progress Note Due: 4/10/2024  Date of Surgery: 1/18/2024  Visit # / Visits authorized: 19/36   FOTO: to be completed on visit 24     Precautions: Standard      Time In:  10:55 (later check in time)   Time Out: 11:50  Total Billable Time: 55 minutes     PTA Visit #: 0/5  Subjective   Patient reports: "It's fine today. It is getting a lot better."     He was compliant with home exercise program.  Response to previous treatment: No adverse effects reported   Functional change:  begin active range of motion     Pain: 0/10 at rest   Location: right shoulder    Objective    Objective Measures updated at progress report unless specified.   Treatment   Elio received the treatments listed below:      therapeutic exercises to develop strength, endurance, ROM, and posture for 29 minutes including .  see flowsheet below   Therapeutic activities to improve functional performance for 16 minutes including: See flowsheet below      Ther-Ex Reps          Pulleys flexion/abduction 4 minutes each    Seated ball rolls flexion/abduction 10 x 10" each    Wrist flexion and extension  20 x each    Radial and Ulnar Deviation  20 x each    Scap Retractions 30 x 3"    Finger Ladder 5 x 10"    Cane flexion stretch 5 x 10"    Bicep Curls  2 x 10 1 #*   Sidelying ER  2 x 10 1#*       Therapeutic-Activities  Reps    I,Y,T's 15 x each *   Prone Rows   2 x 10 *   Prone ABD  2 x 10 *   TB IR and " ER  2 x 10 red TB *    Sidelying PNF patterns  10 x each *   R shoulder Passive range of motion  Completed                  supervised modalities after being cleared for contradictions: IFC Electrical Stimulation:  Anoop received  Electrical Stimulation for tissue tightness and irritation x 10 minutes applied to the right pectoralis, upper trapezius, rhomboid and deltoid.  Anoop tolderated treatment well without any adverse effects.      cold pack for 10 minutes to Right shoulder with ESTIM      Patient Education and Home Exercises       Education provided:   - Plan of Care, Delayed onset muscle soreness, Home exercise program     Written Home Exercises Provided: Patient instructed to cont prior HEP. Exercises were reviewed and Elio was able to demonstrate them prior to the end of the session.  Elio demonstrated fair  understanding of the education provided. See Electronic Medical Record under Patient Instructions for exercises provided during therapy sessions    Assessment     Anoop is a 73 y.o. male referred to outpatient Physical Therapy with a medical diagnosis of R rotator cuff repair and subacromial debridement. Patient presents with R shoulder pain, decreased R shoulder ROM, decreased R UE muscle strength and motor control. Patient's impairments are currently limiting his use of R UE for ADLs and patient is R hand dominant. PT initiated resistance band exercises and prone scapular stability exercises as indicated by post operative protocol. PT provided patient with visual, verbal, and tactile cues during newly added exercises for correct form and decreased compensations. Patient had no reports of pain only muscle fatigue at end of treatment.   Patient's prognosis is good.   Patient will continue to benefit from skilled outpatient physical therapy to address the deficits listed in the problem list box on initial evaluation, provide pt/family education and to maximize pt's level of independence in the home  and community environment.     Patient's spiritual, cultural and educational needs considered and pt agreeable to plan of care and goals.     Anticipated barriers to physical therapy:  compliance with Home exercise program and post surgical restrictions     Goals:   Short Term Goals: 3 weeks   Patient will independently complete Home exercise program with correct form.   Patient will report R shoulder pain less than or equal to 3/10 for improved quality of life.      Long Term Goals: 6 weeks   Pt will increase R shoulder flexion to 120 degrees, external rotation to C4, and internal rotation to L2 for independent dressing  Pt will increase R upper extremity to 4/5 to allow patient to perform activities of daily living independently  Pt will report decrease in pain to 1/10 to improve quality of life.  Pt will place 5 pound object in overhead shelf without hike and with less than or equal to 2/10 pain to allow patient to return to prior level of function   Patient will have increased FOTO score to greater than or equal to 65% for improved function.     Plan   Plan of care Certification: 1/19/2024 to 3/1/2024.     Outpatient Physical Therapy 3 times weekly for 6 weeks to include the following interventions: Electrical Stimulation unattended, Manual Therapy, Moist Heat/ Ice, Neuromuscular Re-ed, Patient Education, Therapeutic Activities, Therapeutic Exercise, Ultrasound, and Vasopneumatic device.     Continue Plan of Care per PT order to progress patient toward rehab goals as tolerated by patient.   Mickey Bustillos, PT, DPT     3/4/2024

## 2024-03-06 ENCOUNTER — CLINICAL SUPPORT (OUTPATIENT)
Dept: REHABILITATION | Facility: HOSPITAL | Age: 74
End: 2024-03-06
Payer: MEDICARE

## 2024-03-06 DIAGNOSIS — M25.511 ACUTE PAIN OF RIGHT SHOULDER: ICD-10-CM

## 2024-03-06 DIAGNOSIS — R52 PAIN: Primary | ICD-10-CM

## 2024-03-06 PROCEDURE — 97530 THERAPEUTIC ACTIVITIES: CPT | Mod: CQ

## 2024-03-06 PROCEDURE — 97110 THERAPEUTIC EXERCISES: CPT | Mod: CQ

## 2024-03-06 NOTE — PROGRESS NOTES
"[facility]   Physical Therapy Treatment Note      Name: Anoop Harkins Sr.  Clinic Number: [id number]    Visit Date: 3/6/2024  Therapy Diagnosis:        Encounter Diagnoses   Name Primary?    S/P right rotator cuff repair      Acute pain of right shoulder Yes        Physician: Keven Vazquez Jr., *     Physician Orders: PT Eval and Treat   Medical Diagnosis from Referral: R rotator cuff repair and subacromial debridement   Evaluation Date: 1/19/2024  Authorization Period Expiration: 1/18/2025  Plan of Care Expiration: 4/10/2024  Progress Note Due: 4/10/2024  Date of Surgery: 1/18/2024  Visit # / Visits authorized: 20/36   FOTO: to be completed on visit 24     Precautions: Standard      Time In:  8:02  Time Out:8:46  Total Billable Time: 44 minutes     PTA Visit #: 1/5  Subjective   Patient reports: "My shoulder (R) is doing good really".     He was compliant with home exercise program.  Response to previous treatment: No adverse effects reported   Functional change:  begin active range of motion     Pain: 0/10 at rest   Location: right shoulder      Objective    Objective Measures updated at progress report unless specified.   Treatment   Elio received the treatments listed below:      therapeutic exercises to develop strength, endurance, ROM, and posture.    see flowsheet below   Therapeutic activities to improve functional performance.  See flowsheet below      Ther-Ex Reps          Pulleys flexion/abduction 4 minutes each    Seated ball rolls flexion/abduction 10 x 10" each    Wrist flexion and extension  20 x each    Radial and Ulnar Deviation  20 x each    Scap Retractions 30 x 3"    Finger Ladder 5 x 10"    Cane flexion stretch 5 x 10"    Bicep Curls  2 x 10 1 #*   Sidelying ER  2 x 10 1#*       Therapeutic-Activities  Reps    I,Y,T's 15 x each    Prone Rows   2 x 10    Prone ABD  2 x 10    TB IR and ER  2 x 10 red TB     Sidelying PNF patterns  10 x each    R shoulder Passive range " "of motion  Completed                  Patient declined: supervised modalities after being cleared for contradictions: IFC Electrical Stimulation:  Anoop received  Electrical Stimulation for tissue tightness and irritation x 10 minutes applied to the right pectoralis, upper trapezius, rhomboid and deltoid.  Anoop tolderated treatment well without any adverse effects.      Patient declined: cold pack for 10 minutes to Right shoulder with ESTIM      Patient Education and Home Exercises       Education provided:   - Plan of Care, Delayed onset muscle soreness, Home exercise program     Written Home Exercises Provided: Patient instructed to cont prior HEP. Exercises were reviewed and Elio was able to demonstrate them prior to the end of the session.  Elio demonstrated fair  understanding of the education provided. See Electronic Medical Record under Patient Instructions for exercises provided during therapy sessions    Assessment     Anoop is a 73 y.o. male referred to outpatient Physical Therapy with a medical diagnosis of R rotator cuff repair and subacromial debridement. Patient presents with R shoulder pain, decreased R shoulder ROM, decreased R UE muscle strength and motor control. Patient's impairments are currently limiting his use of R UE for ADLs and patient is R hand dominant.  Patient able to progress through completion of Therapeutic exercises without reports of increased pain in Right shoulder. Patient's chief complaint is Right shoulder weakness.  Patient declined modalities at end of treatment session, and states "Let's try it without it ".         Patient's prognosis is good.   Patient will continue to benefit from skilled outpatient physical therapy to address the deficits listed in the problem list box on initial evaluation, provide pt/family education and to maximize pt's level of independence in the home and community environment.     Patient's spiritual, cultural and educational needs considered " and pt agreeable to plan of care and goals.     Anticipated barriers to physical therapy:  compliance with Home exercise program and post surgical restrictions     Goals:   Short Term Goals: 3 weeks   Patient will independently complete Home exercise program with correct form.   Patient will report R shoulder pain less than or equal to 3/10 for improved quality of life.      Long Term Goals: 6 weeks   Pt will increase R shoulder flexion to 120 degrees, external rotation to C4, and internal rotation to L2 for independent dressing  Pt will increase R upper extremity to 4/5 to allow patient to perform activities of daily living independently  Pt will report decrease in pain to 1/10 to improve quality of life.  Pt will place 5 pound object in overhead shelf without hike and with less than or equal to 2/10 pain to allow patient to return to prior level of function   Patient will have increased FOTO score to greater than or equal to 65% for improved function.     Plan   Plan of care Certification: 1/19/2024 to 3/1/2024.     Outpatient Physical Therapy 3 times weekly for 6 weeks to include the following interventions: Electrical Stimulation unattended, Manual Therapy, Moist Heat/ Ice, Neuromuscular Re-ed, Patient Education, Therapeutic Activities, Therapeutic Exercise, Ultrasound, and Vasopneumatic device.     Continue Plan of Care per PT order to progress patient toward rehab goals as tolerated by patient.   AUSTYN Ortiz     3/6/2024

## 2024-03-07 ENCOUNTER — CLINICAL SUPPORT (OUTPATIENT)
Dept: REHABILITATION | Facility: HOSPITAL | Age: 74
End: 2024-03-07
Payer: MEDICARE

## 2024-03-07 DIAGNOSIS — M25.511 ACUTE PAIN OF RIGHT SHOULDER: Primary | ICD-10-CM

## 2024-03-07 PROCEDURE — 97110 THERAPEUTIC EXERCISES: CPT | Mod: CQ

## 2024-03-07 PROCEDURE — 97530 THERAPEUTIC ACTIVITIES: CPT | Mod: CQ

## 2024-03-07 PROCEDURE — 97014 ELECTRIC STIMULATION THERAPY: CPT | Mod: CQ

## 2024-03-07 NOTE — PROGRESS NOTES
"[facility]   Physical Therapy Treatment Note      Name: Anoop Harkins Sr.  Clinic Number: [id number]    Visit Date: 3/7/2024  Therapy Diagnosis:        Encounter Diagnoses   Name Primary?    S/P right rotator cuff repair      Acute pain of right shoulder Yes        Physician: Keven Vazquez Jr., *     Physician Orders: PT Eval and Treat   Medical Diagnosis from Referral: R rotator cuff repair and subacromial debridement   Evaluation Date: 1/19/2024  Authorization Period Expiration: 1/18/2025  Plan of Care Expiration: 4/10/2024  Progress Note Due: 4/10/2024  Date of Surgery: 1/18/2024  Visit # / Visits authorized: 21/36   FOTO: to be completed on visit 24     Precautions: Standard      Time In:  10:53  Time Out: 11:42  Total Billable Time: 49 minutes     PTA Visit #: 2/5  Subjective   Patient reports:  "It's a little sore".     He was compliant with home exercise program.  Response to previous treatment: No adverse effects reported   Functional change:  begin active range of motion     Pain: 1/10   Location: right shoulder      Objective    Objective Measures updated at progress report unless specified.   Treatment   Elio received the treatments listed below:      therapeutic exercises to develop strength, endurance, ROM, and posture. see flowsheet below   Therapeutic activities to improve functional performance. See flowsheet below      Ther-Ex Reps          Pulleys flexion/abduction 4 minutes each    Seated ball rolls flexion/abduction 10 x 10" each    Wrist flexion and extension  20 x each    Radial and Ulnar Deviation  20 x each    Scap Retractions 30 x 3"    Finger Ladder 5 x 10"    Cane flexion stretch 5 x 10"    Bicep Curls  2 x 10 1 #   Sidelying ER  2 x 10 1#       Therapeutic-Activities  Reps    I,Y,T's 15 x each    Prone Rows   2 x 10 *   Prone ABD  2 x 10 *   TB IR and ER  2 x 10 red TB     Sidelying PNF patterns  10 x each *   R shoulder Passive range of motion  Completed     "              supervised modalities after being cleared for contradictions: IFC Electrical Stimulation:  Anoop received  Electrical Stimulation for tissue tightness and irritation x 10 minutes applied to the right pectoralis, upper trapezius, rhomboid and deltoid.  Anoop tolderated treatment well without any adverse effects.      cold pack for 10 minutes to Right shoulder with ESTIM      Patient Education and Home Exercises       Education provided:   - Plan of Care, Delayed onset muscle soreness, Home exercise program     Written Home Exercises Provided: Patient instructed to cont prior HEP. Exercises were reviewed and Elio was able to demonstrate them prior to the end of the session.  Elio demonstrated fair  understanding of the education provided. See Electronic Medical Record under Patient Instructions for exercises provided during therapy sessions    Assessment     Anoop is a 73 y.o. male referred to outpatient Physical Therapy with a medical diagnosis of R rotator cuff repair and subacromial debridement. Patient presents with R shoulder pain, decreased R shoulder ROM, decreased R UE muscle strength and motor control. Patient's impairments are currently limiting his use of R UE for ADLs and patient is R hand dominant. Patient reports feeling mild soreness in Right shoulder during completion of Therapeutic exercises and Therapeutic activities.  Patient demonstrates minimal shoulder hiking during completion of active range of motion flexion, scaption, and abduction.  Patient denies reports of increased pain at end of physical therapy treatment session. No adverse effects noted.       Patient's prognosis is good.   Patient will continue to benefit from skilled outpatient physical therapy to address the deficits listed in the problem list box on initial evaluation, provide pt/family education and to maximize pt's level of independence in the home and community environment.     Patient's spiritual, cultural and  educational needs considered and pt agreeable to plan of care and goals.     Anticipated barriers to physical therapy:  compliance with Home exercise program and post surgical restrictions     Goals:   Short Term Goals: 3 weeks   Patient will independently complete Home exercise program with correct form.   Patient will report R shoulder pain less than or equal to 3/10 for improved quality of life.      Long Term Goals: 6 weeks   Pt will increase R shoulder flexion to 120 degrees, external rotation to C4, and internal rotation to L2 for independent dressing  Pt will increase R upper extremity to 4/5 to allow patient to perform activities of daily living independently  Pt will report decrease in pain to 1/10 to improve quality of life.  Pt will place 5 pound object in overhead shelf without hike and with less than or equal to 2/10 pain to allow patient to return to prior level of function   Patient will have increased FOTO score to greater than or equal to 65% for improved function.     Plan   Plan of care Certification: 1/19/2024 to 3/1/2024.     Outpatient Physical Therapy 3 times weekly for 6 weeks to include the following interventions: Electrical Stimulation unattended, Manual Therapy, Moist Heat/ Ice, Neuromuscular Re-ed, Patient Education, Therapeutic Activities, Therapeutic Exercise, Ultrasound, and Vasopneumatic device.     Continue Plan of Care per PT order to progress patient toward rehab goals as tolerated by patient.   AUSTYN Ortiz     3/7/2024

## 2024-03-11 ENCOUNTER — CLINICAL SUPPORT (OUTPATIENT)
Dept: REHABILITATION | Facility: HOSPITAL | Age: 74
End: 2024-03-11
Payer: MEDICARE

## 2024-03-11 DIAGNOSIS — M25.511 ACUTE PAIN OF RIGHT SHOULDER: Primary | ICD-10-CM

## 2024-03-11 PROCEDURE — 97010 HOT OR COLD PACKS THERAPY: CPT

## 2024-03-11 PROCEDURE — 97014 ELECTRIC STIMULATION THERAPY: CPT | Mod: KX

## 2024-03-11 PROCEDURE — 97110 THERAPEUTIC EXERCISES: CPT | Mod: KX

## 2024-03-11 PROCEDURE — 97530 THERAPEUTIC ACTIVITIES: CPT | Mod: KX

## 2024-03-11 NOTE — PROGRESS NOTES
"[facility]   Physical Therapy Treatment Note      Name: Anoop Harkins Sr.  Clinic Number: [id number]    Visit Date: 3/11/2024  Therapy Diagnosis:        Encounter Diagnoses   Name Primary?    S/P right rotator cuff repair      Acute pain of right shoulder Yes        Physician: Keven Vazquez Jr., *     Physician Orders: PT Eval and Treat   Medical Diagnosis from Referral: R rotator cuff repair and subacromial debridement   Evaluation Date: 1/19/2024  Authorization Period Expiration: 1/18/2025  Plan of Care Expiration: 4/10/2024  Progress Note Due: 4/10/2024  Date of Surgery: 1/18/2024  Visit # / Visits authorized: 22/36   FOTO: to be completed on visit 24     Precautions: Standard      Time In:  8:00  Time Out: 8:55  Total Billable Time: 55 minutes     PTA Visit #: 0/5  Subjective   Patient reports:  "It is stiff this morning."     He was compliant with home exercise program.  Response to previous treatment: No adverse effects reported   Functional change:  increased active range of motion with less pain     Pain: 0/10   Location: right shoulder      Objective    Objective Measures updated at progress report unless specified.   Treatment   Elio received the treatments listed below:      therapeutic exercises to develop strength, endurance, ROM, and posture for 16 minutes. see flowsheet below   Therapeutic activities to improve functional performance for 29 minutes . See flowsheet below   *indicates increases in reps or resistance during this treatment session       Ther-Ex Reps          Pulleys flexion/abduction 3 minutes each    Standing ball rolls flexion/abduction 5 x 5" each    Scap Retractions 30 x 3"    Finger Ladder 5 x 10"    Cane flexion stretch 5 x 10"    Bicep Curls  2 x 10 2 #   IR with pulleys  3 minutes*   Therapeutic-Activities  Reps    I,Y,T's 20 x 1# each *   Prone Rows   2 x 10    Prone ABD  2 x 10    Prone Extension  2 x 10 *   TB IR and ER  2 x 10 red TB    Standing " PNF patterns  10 x each    Rows with Tband  2 x 10 red TB *   B extension with Tband  2 x 10 red TB *   Spider génesis  3 x up and down yellow TB *   Wall Clock  3 x yellow TB *           supervised modalities after being cleared for contradictions: IFC Electrical Stimulation:  Anoop received  Electrical Stimulation for tissue irritation  x 10 minutes applied to the R shoulder .  Anoop tolderated treatment well without any adverse effects.      cold pack for 10 minutes to Right shoulder with ESTIM      Patient Education and Home Exercises       Education provided:   - Plan of Care, Delayed onset muscle soreness, Home exercise program     Written Home Exercises Provided: Patient instructed to cont prior HEP. Exercises were reviewed and Elio was able to demonstrate them prior to the end of the session.  Elio demonstrated fair  understanding of the education provided. See Electronic Medical Record under Patient Instructions for exercises provided during therapy sessions    Assessment     Anoop is a 73 y.o. male referred to outpatient Physical Therapy with a medical diagnosis of R rotator cuff repair and subacromial debridement. Patient presents with R shoulder pain, decreased R shoulder ROM, decreased R UE muscle strength and motor control. Patient's impairments are currently limiting his use of R UE for ADLs and patient is R hand dominant. PT increased treatment intensity and initiated increased shoulder strengthening exercises as indicated by MD protocol. Patient required visual, verbal and tactile cueing during newly added tasks for correct form, speed, and increased scapular stability. Patient had no reports of pain, but he did report increased fatigue with newly added tasks. Patient continues to lack internal rotation ROM passively; therefore PT added internal rotation with pulleys to increase ROM. No adverse effects noted to treatment.       Patient's prognosis is good.   Patient will continue to benefit from  skilled outpatient physical therapy to address the deficits listed in the problem list box on initial evaluation, provide pt/family education and to maximize pt's level of independence in the home and community environment.     Patient's spiritual, cultural and educational needs considered and pt agreeable to plan of care and goals.     Anticipated barriers to physical therapy:  compliance with Home exercise program and post surgical restrictions     Goals:   Short Term Goals: 3 weeks   Patient will independently complete Home exercise program with correct form.   Patient will report R shoulder pain less than or equal to 3/10 for improved quality of life.      Long Term Goals: 6 weeks   Pt will increase R shoulder flexion to 120 degrees, external rotation to C4, and internal rotation to L2 for independent dressing  Pt will increase R upper extremity to 4/5 to allow patient to perform activities of daily living independently  Pt will report decrease in pain to 1/10 to improve quality of life.  Pt will place 5 pound object in overhead shelf without hike and with less than or equal to 2/10 pain to allow patient to return to prior level of function   Patient will have increased FOTO score to greater than or equal to 65% for improved function.     Plan   Plan of care Certification: 1/19/2024 to 3/1/2024.     Outpatient Physical Therapy 3 times weekly for 6 weeks to include the following interventions: Electrical Stimulation unattended, Manual Therapy, Moist Heat/ Ice, Neuromuscular Re-ed, Patient Education, Therapeutic Activities, Therapeutic Exercise, Ultrasound, and Vasopneumatic device.     Continue Plan of Care per PT order to progress patient toward rehab goals as tolerated by patient.   Mickey Bustillos, PT, DPT       3/11/2024

## 2024-03-13 ENCOUNTER — CLINICAL SUPPORT (OUTPATIENT)
Dept: REHABILITATION | Facility: HOSPITAL | Age: 74
End: 2024-03-13
Payer: MEDICARE

## 2024-03-13 DIAGNOSIS — M25.511 ACUTE PAIN OF RIGHT SHOULDER: Primary | ICD-10-CM

## 2024-03-13 PROCEDURE — 97110 THERAPEUTIC EXERCISES: CPT | Mod: CQ

## 2024-03-13 PROCEDURE — 97530 THERAPEUTIC ACTIVITIES: CPT | Mod: CQ

## 2024-03-13 NOTE — PROGRESS NOTES
"[facility]   Physical Therapy Treatment Note      Name: Anoop Harkins Sr.  Clinic Number: [id number]    Visit Date: 3/13/2024  Therapy Diagnosis:        Encounter Diagnoses   Name Primary?    S/P right rotator cuff repair      Acute pain of right shoulder Yes        Physician: Keven Vazquez Jr., *     Physician Orders: PT Eval and Treat   Medical Diagnosis from Referral: R rotator cuff repair and subacromial debridement   Evaluation Date: 1/19/2024  Authorization Period Expiration: 1/18/2025  Plan of Care Expiration: 4/10/2024  Progress Note Due: 4/10/2024  Date of Surgery: 1/18/2024  Visit # / Visits authorized: 23/36   FOTO: to be completed on visit 24     Precautions: Standard      Time In:  8:00  Time Out: 8:55  Total Billable Time: 55 minutes     PTA Visit #: 0/5  Subjective   Patient reports:  "I'm fine as frog hair".  Patient does not report complaints, and denies pain in Right shoulder.     He was compliant with home exercise program.  Response to previous treatment: No adverse effects reported   Functional change:  increased active range of motion with less pain     Pain: 0/10   Location: right shoulder      Objective    Objective Measures updated at progress report unless specified.   Treatment   Elio received the treatments listed below:      therapeutic exercises to develop strength, endurance, ROM, and posture for 16 minutes. see flowsheet below   Therapeutic activities to improve functional performance for 29 minutes . See flowsheet below   *indicates increases in reps or resistance during this treatment session       Ther-Ex Reps          Pulleys flexion/abduction 4 minutes each    Standing ball rolls flexion 10 x 5" each    Scap Retractions 30 x 3"    Finger Ladder 5 x 10"    Cane flexion stretch 5 x 10"    Bicep Curls  2 x 10 2 #   IR with pulleys  3 minutes*   Therapeutic-Activities  Reps    I,Y,T's 20 x 1# each *   Prone Rows   2 x 10, 2# *   Prone ABD  2 x 10   Prone " Extension  2 x 10, 2# *    TB IR and ER  2 x 10 red TB    Standing PNF patterns  10 x each    Rows with Tband  2 x 10 red TB    B extension with Tband  2 x 10 red TB    Spider génesis  3 x up and down yellow TB    Wall Clock  3 x yellow TB            Not completed:  supervised modalities after being cleared for contradictions: IFC Electrical Stimulation:  Anoop received  Electrical Stimulation for tissue irritation  x 10 minutes applied to the R shoulder .  Anoop tolderated treatment well without any adverse effects.      Not completed:  cold pack for 10 minutes to Right shoulder with ESTIM      Patient Education and Home Exercises       Education provided:   - Plan of Care, Delayed onset muscle soreness, Home exercise program     Written Home Exercises Provided: Patient instructed to cont prior HEP. Exercises were reviewed and Elio was able to demonstrate them prior to the end of the session.  Elio demonstrated fair  understanding of the education provided. See Electronic Medical Record under Patient Instructions for exercises provided during therapy sessions    Assessment     Anoop is a 73 y.o. male referred to outpatient Physical Therapy with a medical diagnosis of R rotator cuff repair and subacromial debridement. Patient presents with R shoulder pain, decreased R shoulder ROM, decreased R UE muscle strength and motor control. Patient's impairments are currently limiting his use of R UE for ADLs and patient is R hand dominant.  Patient required visual, verbal and tactile cueing during newly added tasks for correct form, speed, and increased scapular stability.  Patient's functional active range of motion Right UE at end of physical therapy treatment session:  ER reach C7;  IR reach T12.  No adverse effects noted or reported.     Patient's prognosis is good.   Patient will continue to benefit from skilled outpatient physical therapy to address the deficits listed in the problem list box on initial evaluation,  provide pt/family education and to maximize pt's level of independence in the home and community environment.     Patient's spiritual, cultural and educational needs considered and pt agreeable to plan of care and goals.     Anticipated barriers to physical therapy:  compliance with Home exercise program and post surgical restrictions     Goals:   Short Term Goals: 3 weeks   Patient will independently complete Home exercise program with correct form.   Patient will report R shoulder pain less than or equal to 3/10 for improved quality of life.      Long Term Goals: 6 weeks   Pt will increase R shoulder flexion to 120 degrees, external rotation to C4, and internal rotation to L2 for independent dressing  Pt will increase R upper extremity to 4/5 to allow patient to perform activities of daily living independently  Pt will report decrease in pain to 1/10 to improve quality of life.  Pt will place 5 pound object in overhead shelf without hike and with less than or equal to 2/10 pain to allow patient to return to prior level of function   Patient will have increased FOTO score to greater than or equal to 65% for improved function.     Plan   Plan of care Certification: 1/19/2024 to 3/1/2024.     Outpatient Physical Therapy 3 times weekly for 6 weeks to include the following interventions: Electrical Stimulation unattended, Manual Therapy, Moist Heat/ Ice, Neuromuscular Re-ed, Patient Education, Therapeutic Activities, Therapeutic Exercise, Ultrasound, and Vasopneumatic device.     Continue Plan of Care per PT order to progress patient toward rehab goals as tolerated by patient.   AUSTYN Ortiz     3/13/2024

## 2024-03-15 ENCOUNTER — CLINICAL SUPPORT (OUTPATIENT)
Dept: REHABILITATION | Facility: HOSPITAL | Age: 74
End: 2024-03-15
Payer: MEDICARE

## 2024-03-15 DIAGNOSIS — M25.511 ACUTE PAIN OF RIGHT SHOULDER: Primary | ICD-10-CM

## 2024-03-15 PROCEDURE — 97014 ELECTRIC STIMULATION THERAPY: CPT | Mod: KX

## 2024-03-15 PROCEDURE — 97010 HOT OR COLD PACKS THERAPY: CPT

## 2024-03-15 PROCEDURE — 97530 THERAPEUTIC ACTIVITIES: CPT | Mod: KX

## 2024-03-15 PROCEDURE — 97110 THERAPEUTIC EXERCISES: CPT | Mod: KX

## 2024-03-15 NOTE — PROGRESS NOTES
"[facility]   Physical Therapy Treatment Note      Name: Anoop Harkins Sr.  Clinic Number: [id number]    Visit Date: 3/15/2024  Therapy Diagnosis:        Encounter Diagnoses   Name Primary?    S/P right rotator cuff repair      Acute pain of right shoulder Yes        Physician: Keven Vazquez Jr., *     Physician Orders: PT Eval and Treat   Medical Diagnosis from Referral: R rotator cuff repair and subacromial debridement   Evaluation Date: 1/19/2024  Authorization Period Expiration: 1/18/2025  Plan of Care Expiration: 4/10/2024  Progress Note Due: 4/10/2024  Date of Surgery: 1/18/2024  Visit # / Visits authorized: 24/36   FOTO: to be completed on visit 24     Precautions: Standard      Time In:  7:55 (later check in time)   Time Out: 9:00  Total Billable Time: 65 minutes     PTA Visit #: 0/5  Subjective   Patient reports:  "My  didn't bother my arm at all."      He was compliant with home exercise program.  Response to previous treatment: No adverse effects reported   Functional change:  increased active range of motion with less pain     Pain: 0/10   Location: right shoulder      Objective    Objective Measures updated at progress report unless specified.   Treatment   Elio received the treatments listed below:      therapeutic exercises to develop strength, endurance, ROM, and posture for 16 minutes. see flowsheet below   Therapeutic activities to improve functional performance for 29 minutes . See flowsheet below   *indicates increases in reps or resistance during this treatment session       Ther-Ex Reps          Pulleys flexion/abduction 4 minutes each    Standing ball rolls flexion 10 x 5" each    Scap Retractions 30 x 3"    Finger Ladder 5 x 10"    Cane flexion stretch 5 x 10"    Bicep Curls  2 x 10 2 #   IR with pulleys  3 minutes   Therapeutic-Activities  Reps    I,Y,T's 20 x 1# each    Prone Rows   2 x 10, 2#    Prone ABD  2 x 10   Prone Extension  2 x 10, 2#    TB IR " and ER  2 x 10 red TB    Standing PNF patterns  10 x each yellow TB *   Rows with Tband  2 x 10 red TB    B extension with Tband  2 x 10 red TB    Spider génesis  3 x up and down yellow TB    Wall Clock  3 x yellow TB            supervised modalities after being cleared for contradictions: IFC Electrical Stimulation:  Anoop received  Electrical Stimulation for tissue irritation  x 10 minutes applied to the R shoulder .  Anoop tolderated treatment well without any adverse effects.      cold pack for 10 minutes to Right shoulder with ESTIM      Patient Education and Home Exercises       Education provided:   - Plan of Care, Delayed onset muscle soreness, Home exercise program     Written Home Exercises Provided: Patient instructed to cont prior HEP. Exercises were reviewed and Elio was able to demonstrate them prior to the end of the session.  Elio demonstrated fair  understanding of the education provided. See Electronic Medical Record under Patient Instructions for exercises provided during therapy sessions    Assessment     Anoop is a 73 y.o. male referred to outpatient Physical Therapy with a medical diagnosis of R rotator cuff repair and subacromial debridement. Patient presents with R shoulder pain, decreased R shoulder ROM, decreased R UE muscle strength and motor control. Patient's impairments are currently limiting his use of R UE for ADLs and patient is R hand dominant.  PT provided patient with visual and verbal cueing for progression through exercises with improved stability and decreased compensations. Patient reported fatigue during R shoulder active range of motion exercises but he had no reports of adverse effects to treatment. Patient is progressing well toward rehab goals.      Patient's prognosis is good.   Patient will continue to benefit from skilled outpatient physical therapy to address the deficits listed in the problem list box on initial evaluation, provide pt/family education and to maximize  pt's level of independence in the home and community environment.     Patient's spiritual, cultural and educational needs considered and pt agreeable to plan of care and goals.     Anticipated barriers to physical therapy:  compliance with Home exercise program and post surgical restrictions     Goals:   Short Term Goals: 3 weeks   Patient will independently complete Home exercise program with correct form.   Patient will report R shoulder pain less than or equal to 3/10 for improved quality of life.      Long Term Goals: 6 weeks   Pt will increase R shoulder flexion to 120 degrees, external rotation to C4, and internal rotation to L2 for independent dressing  Pt will increase R upper extremity to 4/5 to allow patient to perform activities of daily living independently  Pt will report decrease in pain to 1/10 to improve quality of life.  Pt will place 5 pound object in overhead shelf without hike and with less than or equal to 2/10 pain to allow patient to return to prior level of function   Patient will have increased FOTO score to greater than or equal to 65% for improved function.     Plan   Plan of care Certification: 1/19/2024 to 3/1/2024.     Outpatient Physical Therapy 3 times weekly for 6 weeks to include the following interventions: Electrical Stimulation unattended, Manual Therapy, Moist Heat/ Ice, Neuromuscular Re-ed, Patient Education, Therapeutic Activities, Therapeutic Exercise, Ultrasound, and Vasopneumatic device.     Continue Plan of Care per PT order to progress patient toward rehab goals as tolerated by patient.   Mickey Bustillos, PT, DPT       3/15/2024

## 2024-03-18 ENCOUNTER — CLINICAL SUPPORT (OUTPATIENT)
Dept: REHABILITATION | Facility: HOSPITAL | Age: 74
End: 2024-03-18
Payer: MEDICARE

## 2024-03-18 DIAGNOSIS — M25.511 ACUTE PAIN OF RIGHT SHOULDER: Primary | ICD-10-CM

## 2024-03-18 PROCEDURE — 97530 THERAPEUTIC ACTIVITIES: CPT | Mod: CQ

## 2024-03-18 PROCEDURE — 97110 THERAPEUTIC EXERCISES: CPT | Mod: CQ

## 2024-03-18 NOTE — PROGRESS NOTES
"[facility]   Physical Therapy Treatment Note      Name: Anoop Harkins Sr.  Clinic Number: [id number]    Visit Date: 3/18/2024  Therapy Diagnosis:        Encounter Diagnoses   Name Primary?    S/P right rotator cuff repair      Acute pain of right shoulder Yes        Physician: Keven Vazquez Jr., *     Physician Orders: PT Eval and Treat   Medical Diagnosis from Referral: R rotator cuff repair and subacromial debridement   Evaluation Date: 1/19/2024  Authorization Period Expiration: 1/18/2025  Plan of Care Expiration: 4/10/2024  Progress Note Due: 4/10/2024  Date of Surgery: 1/18/2024  Visit # / Visits authorized: 25/36   FOTO: to be completed on visit 2     Precautions: Standard      Time In:  8:02  Time Out: 8:47  Total Billable Time: 45 minutes     PTA Visit #: 1/5  Subjective   Patient reports:  "My shoulder is doing good".  Patient's chief complaint is not being able to "get my arm behind my back".     He was compliant with home exercise program.  Response to previous treatment: No adverse effects reported   Functional change:  increased active range of motion with less pain     Pain: 0/10   Location: right shoulder      Objective    Objective Measures updated at progress report unless specified.   Treatment   Elio received the treatments listed below:      therapeutic exercises to develop strength, endurance, ROM, and posture for 16 minutes. see flowsheet below   Therapeutic activities to improve functional performance for 29 minutes . See flowsheet below   *indicates increases in reps or resistance during this treatment session       Ther-Ex Reps          Pulleys flexion/abduction 4 minutes each    Standing ball rolls flexion 10 x 5" each    Scap Retractions 30 x 3"    Finger Ladder 5 x 10"    Cane flexion stretch 5 x 10"    Bicep Curls  2 x 10 2 #   IR with pulleys  3 minutes   Therapeutic-Activities  Reps    I,Y,T's 20 x 1# each    Prone Rows   2 x 10, 2#    Prone ABD  2 x 10 "   Prone Extension  2 x 10, 2#    TB IR and ER  2 x 10 red TB    Standing PNF patterns  10 x each yellow TB *   Rows with Tband  2 x 10 red TB    B extension with Tband  2 x 10 red TB    Spider génesis  3 x up and down yellow TB    Wall Clock  3 x yellow TB            Not completed: supervised modalities after being cleared for contradictions: IFC Electrical Stimulation:  Anoop received  Electrical Stimulation for tissue irritation  x 10 minutes applied to the R shoulder .  Anoop tolderated treatment well without any adverse effects.      Not completed: cold pack for 10 minutes to Right shoulder with ESTIM      Patient Education and Home Exercises       Education provided:   - Plan of Care, Delayed onset muscle soreness, Home exercise program     Written Home Exercises Provided: Patient instructed to cont prior HEP. Exercises were reviewed and Elio was able to demonstrate them prior to the end of the session.  Elio demonstrated fair  understanding of the education provided. See Electronic Medical Record under Patient Instructions for exercises provided during therapy sessions    Assessment     Anoop is a 73 y.o. male referred to outpatient Physical Therapy with a medical diagnosis of R rotator cuff repair and subacromial debridement. Patient presents with R shoulder pain, decreased R shoulder ROM, decreased R UE muscle strength and motor control. Patient's impairments are currently limiting his use of R UE for ADLs and patient is R hand dominant.  LPTA provided patient with visual and verbal cueing for progression through exercises with improved stability and decreased compensations. Patient reported fatigue during R shoulder active range of motion exercises but he had no reports of adverse effects to treatment. Right shoulder active range of motion flexion and active range of motion ER WFL's, but IR is still limited.  Patient is progressing well toward rehab goals.      Patient's prognosis is good.   Patient will  continue to benefit from skilled outpatient physical therapy to address the deficits listed in the problem list box on initial evaluation, provide pt/family education and to maximize pt's level of independence in the home and community environment.     Patient's spiritual, cultural and educational needs considered and pt agreeable to plan of care and goals.     Anticipated barriers to physical therapy:  compliance with Home exercise program and post surgical restrictions     Goals:   Short Term Goals: 3 weeks   Patient will independently complete Home exercise program with correct form.   Patient will report R shoulder pain less than or equal to 3/10 for improved quality of life.      Long Term Goals: 6 weeks   Pt will increase R shoulder flexion to 120 degrees, external rotation to C4, and internal rotation to L2 for independent dressing  Pt will increase R upper extremity to 4/5 to allow patient to perform activities of daily living independently  Pt will report decrease in pain to 1/10 to improve quality of life.  Pt will place 5 pound object in overhead shelf without hike and with less than or equal to 2/10 pain to allow patient to return to prior level of function   Patient will have increased FOTO score to greater than or equal to 65% for improved function.     Plan   Plan of care Certification: 1/19/2024 to 3/1/2024.     Outpatient Physical Therapy 3 times weekly for 6 weeks to include the following interventions: Electrical Stimulation unattended, Manual Therapy, Moist Heat/ Ice, Neuromuscular Re-ed, Patient Education, Therapeutic Activities, Therapeutic Exercise, Ultrasound, and Vasopneumatic device.     Continue Plan of Care per PT order to progress patient toward rehab goals as tolerated by patient.   AUSTYN Ortiz     3/18/2024

## 2024-03-19 ENCOUNTER — OFFICE VISIT (OUTPATIENT)
Dept: FAMILY MEDICINE | Facility: CLINIC | Age: 74
End: 2024-03-19

## 2024-03-19 VITALS
TEMPERATURE: 98 F | WEIGHT: 186 LBS | DIASTOLIC BLOOD PRESSURE: 82 MMHG | RESPIRATION RATE: 20 BRPM | BODY MASS INDEX: 29.89 KG/M2 | OXYGEN SATURATION: 96 % | HEIGHT: 66 IN | HEART RATE: 84 BPM | SYSTOLIC BLOOD PRESSURE: 120 MMHG

## 2024-03-19 DIAGNOSIS — Z02.89 ENCOUNTER FOR EXAMINATION REQUIRED BY DEPARTMENT OF TRANSPORTATION (DOT): Primary | ICD-10-CM

## 2024-03-19 PROCEDURE — 99499 UNLISTED E&M SERVICE: CPT | Mod: ,,, | Performed by: NURSE PRACTITIONER

## 2024-03-19 RX ORDER — CYCLOBENZAPRINE HCL 10 MG
10 TABLET ORAL 3 TIMES DAILY
COMMUNITY
Start: 2024-03-13

## 2024-03-19 RX ORDER — TESTOSTERONE CYPIONATE 200 MG/ML
200 INJECTION, SOLUTION INTRAMUSCULAR
COMMUNITY
Start: 2023-07-26

## 2024-03-19 RX ORDER — DICLOFENAC SODIUM 10 MG/G
2 GEL TOPICAL 2 TIMES DAILY
COMMUNITY
Start: 2023-07-25

## 2024-03-20 ENCOUNTER — CLINICAL SUPPORT (OUTPATIENT)
Dept: REHABILITATION | Facility: HOSPITAL | Age: 74
End: 2024-03-20
Payer: MEDICARE

## 2024-03-20 DIAGNOSIS — M25.511 ACUTE PAIN OF RIGHT SHOULDER: Primary | ICD-10-CM

## 2024-03-20 PROCEDURE — 97530 THERAPEUTIC ACTIVITIES: CPT | Mod: CQ

## 2024-03-20 PROCEDURE — 97110 THERAPEUTIC EXERCISES: CPT | Mod: CQ

## 2024-03-22 ENCOUNTER — CLINICAL SUPPORT (OUTPATIENT)
Dept: REHABILITATION | Facility: HOSPITAL | Age: 74
End: 2024-03-22
Payer: MEDICARE

## 2024-03-22 DIAGNOSIS — M25.511 ACUTE PAIN OF RIGHT SHOULDER: Primary | ICD-10-CM

## 2024-03-22 PROCEDURE — 97110 THERAPEUTIC EXERCISES: CPT | Mod: CQ

## 2024-03-22 PROCEDURE — 97530 THERAPEUTIC ACTIVITIES: CPT | Mod: CQ

## 2024-03-22 NOTE — PROGRESS NOTES
"[facility]   Physical Therapy Treatment Note      Name: Anoop Harkins Sr.  Clinic Number: [id number]    Visit Date: 3/22/2024  Therapy Diagnosis:        Encounter Diagnoses   Name Primary?    S/P right rotator cuff repair      Acute pain of right shoulder Yes        Physician: Keven Vazquez Jr., *     Physician Orders: PT Eval and Treat   Medical Diagnosis from Referral: R rotator cuff repair and subacromial debridement   Evaluation Date: 1/19/2024  Authorization Period Expiration: 1/18/2025  Plan of Care Expiration: 4/10/2024  Progress Note Due: 4/10/2024  Date of Surgery: 1/18/2024  Visit # / Visits authorized: 27/36   FOTO: to be completed on visit 2     Precautions: Standard      Time In: 8:05  Time Out: 8:52  Total Billable Time: 47 minutes     PTA Visit #: 3/5  Subjective   Patient reports: No new complaints re: Right shoulder/UE and denies reports of shoulder pain.     He was compliant with home exercise program.  Response to previous treatment: No adverse effects reported   Functional change:  increased active range of motion with less pain     Pain: 0/10   Location: right shoulder      Objective    Objective Measures updated at progress report unless specified.   Treatment   Elio received the treatments listed below:      therapeutic exercises to develop strength, endurance, ROM, and posture for 16 minutes. see flowsheet below   Therapeutic activities to improve functional performance for 29 minutes . See flowsheet below   *indicates increases in reps or resistance during this treatment session       Ther-Ex Reps          Pulleys flexion/abduction 4 minutes each    Standing ball rolls flexion 10 x 5" each    Scap Retractions 30 x 3"    Finger Ladder 5 x 10"    Cane flexion stretch 5 x 10"    Bicep Curls  2 x 10 2 #   IR with pulleys  3 minutes   Therapeutic-Activities  Reps    I,Y,T's 20 x 1# each    Prone Rows   2 x 10, 2#    Prone ABD  2 x 10   Prone Extension  2 x 10, 2#  "   TB IR and ER  2 x 10 red TB    Standing PNF patterns  10 x each red TB    Rows with Tband  2 x 10 red TB    B extension with Tband  2 x 10 red TB    Spider génesis  3 x up and down red TB    Wall Clock  3 x red TB            Not completed: supervised modalities after being cleared for contradictions: IFC Electrical Stimulation:  Anoop received  Electrical Stimulation for tissue irritation  x 10 minutes applied to the R shoulder .  Anoop tolderated treatment well without any adverse effects.      Not completed: cold pack for 10 minutes to Right shoulder with ESTIM      Patient Education and Home Exercises       Education provided:   - Plan of Care, Delayed onset muscle soreness, Home exercise program     Written Home Exercises Provided: Patient instructed to cont prior HEP. Exercises were reviewed and Elio was able to demonstrate them prior to the end of the session.  Elio demonstrated fair  understanding of the education provided. See Electronic Medical Record under Patient Instructions for exercises provided during therapy sessions    Assessment     Anoop is a 73 y.o. male referred to outpatient Physical Therapy with a medical diagnosis of R rotator cuff repair and subacromial debridement. Patient presents with R shoulder pain, decreased R shoulder ROM, decreased R UE muscle strength and motor control. Patient's impairments are currently limiting his use of R UE for ADLs and patient is R hand dominant. LPTA provided pt with visual and verbal cueing for progression through exercises with improved stability and decreased compensations. Very minimal substitution noted with active range of motion exercises and patient displays overall good carryover progressing through exercises.  Continues to present with IR limitations.     Patient's prognosis is good.   Patient will continue to benefit from skilled outpatient physical therapy to address the deficits listed in the problem list box on initial evaluation, provide  pt/family education and to maximize pt's level of independence in the home and community environment.     Patient's spiritual, cultural and educational needs considered and pt agreeable to plan of care and goals.     Anticipated barriers to physical therapy:  compliance with Home exercise program and post surgical restrictions     Goals:   Short Term Goals: 3 weeks   Patient will independently complete Home exercise program with correct form.   Patient will report R shoulder pain less than or equal to 3/10 for improved quality of life.      Long Term Goals: 6 weeks   Pt will increase R shoulder flexion to 120 degrees, external rotation to C4, and internal rotation to L2 for independent dressing  Pt will increase R upper extremity to 4/5 to allow patient to perform activities of daily living independently  Pt will report decrease in pain to 1/10 to improve quality of life.  Pt will place 5 pound object in overhead shelf without hike and with less than or equal to 2/10 pain to allow patient to return to prior level of function   Patient will have increased FOTO score to greater than or equal to 65% for improved function.     Plan   Plan of care Certification: 1/19/2024 to 3/1/2024.     Outpatient Physical Therapy 3 times weekly for 6 weeks to include the following interventions: Electrical Stimulation unattended, Manual Therapy, Moist Heat/ Ice, Neuromuscular Re-ed, Patient Education, Therapeutic Activities, Therapeutic Exercise, Ultrasound, and Vasopneumatic device.     Continue Plan of Care per PT order to progress patient toward rehab goals as tolerated by patient.   AUSTYN Ortiz   3/22/2024

## 2024-03-25 ENCOUNTER — CLINICAL SUPPORT (OUTPATIENT)
Dept: REHABILITATION | Facility: HOSPITAL | Age: 74
End: 2024-03-25
Payer: MEDICARE

## 2024-03-25 DIAGNOSIS — M25.511 ACUTE PAIN OF RIGHT SHOULDER: Primary | ICD-10-CM

## 2024-03-25 PROCEDURE — 97110 THERAPEUTIC EXERCISES: CPT | Mod: CQ

## 2024-03-25 PROCEDURE — 97530 THERAPEUTIC ACTIVITIES: CPT | Mod: CQ

## 2024-03-25 NOTE — PROGRESS NOTES
"[facility]   Physical Therapy Treatment Note      Name: Anoop Harkins Sr.  Clinic Number: [id number]    Visit Date: 3/25/2024  Therapy Diagnosis:        Encounter Diagnoses   Name Primary?    S/P right rotator cuff repair      Acute pain of right shoulder Yes        Physician: Keven Vazquez Jr., *     Physician Orders: PT Eval and Treat   Medical Diagnosis from Referral: R rotator cuff repair and subacromial debridement   Evaluation Date: 1/19/2024  Authorization Period Expiration: 1/18/2025  Plan of Care Expiration: 4/10/2024  Progress Note Due: 4/10/2024  Date of Surgery: 1/18/2024  Visit # / Visits authorized: 27/36   FOTO: to be completed on visit 2     Precautions: Standard      Time In: 8:03  Time Out: 8:45  Total Billable Time: 41 minutes     PTA Visit #: 4/5  Subjective   Patient reports: Chief complaint of limited IR Right shoulder.  No reports of R shoulder pain.       He was compliant with home exercise program.  Response to previous treatment: No adverse effects reported   Functional change:  increased active range of motion with less pain     Pain: 0/10   Location: right shoulder      Objective    Objective Measures updated at progress report unless specified.   Treatment   Elio received the treatments listed below:      therapeutic exercises to develop strength, endurance, ROM, and posture.  Therapeutic activities to improve functional performance .See flowsheet below   *indicates increases in reps or resistance during this treatment session       Ther-Ex Reps    UBE 3/3   Pulleys flexion/abduction 4 minutes each    Standing ball rolls flexion 10 x 5" each    Scap Retractions 30 x 3"    Finger Ladder 5 x 10"    Cane flexion stretch 5 x 10"    Bicep Curls  2 x 10 2 #   IR with pulleys  3 minutes   Therapeutic-Activities  Reps    I,Y,T's 20 x 1# each    Prone Rows   2 x 10, 2#    Prone ABD  2 x 10   Prone Extension  2 x 10, 2#    TB IR and ER  2 x 10 red TB    Standing PNF " patterns  10 x each red TB    Rows with Tband  2 x 10 red TB    B extension with Tband  2 x 10 red TB    Spider génesis  3 x up and down red TB    Wall Clock  3 x red TB            Not completed: supervised modalities after being cleared for contradictions: IFC Electrical Stimulation:  Anoop received  Electrical Stimulation for tissue irritation  x 10 minutes applied to the R shoulder .  Anoop tolderated treatment well without any adverse effects.      Not completed: cold pack for 10 minutes to Right shoulder with ESTIM      Patient Education and Home Exercises       Education provided:   - Plan of Care, Delayed onset muscle soreness, Home exercise program     Written Home Exercises Provided: Patient instructed to cont prior HEP. Exercises were reviewed and Elio was able to demonstrate them prior to the end of the session.  Elio demonstrated fair  understanding of the education provided. See Electronic Medical Record under Patient Instructions for exercises provided during therapy sessions    Assessment     Anoop is a 73 y.o. male referred to outpatient Physical Therapy with a medical diagnosis of R rotator cuff repair and subacromial debridement. Patient presents with R shoulder pain, decreased R shoulder ROM, decreased R UE muscle strength and motor control. Patient's impairments are currently limiting his use of R UE for ADLs and patient is R hand dominant. LPTA provided pt with visual and verbal cueing for progression through exercises with improved stability and decreased compensations. Noted min Right shoulder hiking with active shoulder abduction.  Patient reports feeling tightness in neck.  Brief scap mobs completed.  Instructed patient in IR stretch with sheet or belt to be completed at home.  No adverse effects noted. Functional IR reach T12 with difficulty. Functional ER stretch C5 with mod effort.     Patient's prognosis is good.   Patient will continue to benefit from skilled outpatient physical therapy  to address the deficits listed in the problem list box on initial evaluation, provide pt/family education and to maximize pt's level of independence in the home and community environment.     Patient's spiritual, cultural and educational needs considered and pt agreeable to plan of care and goals.     Anticipated barriers to physical therapy:  compliance with Home exercise program and post surgical restrictions     Goals:   Short Term Goals: 3 weeks   Patient will independently complete Home exercise program with correct form.   Patient will report R shoulder pain less than or equal to 3/10 for improved quality of life.      Long Term Goals: 6 weeks   Pt will increase R shoulder flexion to 120 degrees, external rotation to C4, and internal rotation to L2 for independent dressing  Pt will increase R upper extremity to 4/5 to allow patient to perform activities of daily living independently  Pt will report decrease in pain to 1/10 to improve quality of life.  Pt will place 5 pound object in overhead shelf without hike and with less than or equal to 2/10 pain to allow patient to return to prior level of function   Patient will have increased FOTO score to greater than or equal to 65% for improved function.     Plan   Plan of care Certification: 1/19/2024 to 3/1/2024.     Outpatient Physical Therapy 3 times weekly for 6 weeks to include the following interventions: Electrical Stimulation unattended, Manual Therapy, Moist Heat/ Ice, Neuromuscular Re-ed, Patient Education, Therapeutic Activities, Therapeutic Exercise, Ultrasound, and Vasopneumatic device.     Continue Plan of Care per PT order to progress patient toward rehab goals as tolerated by patient.   AUSTYN Ortiz   3/25/2024

## 2024-03-28 ENCOUNTER — CLINICAL SUPPORT (OUTPATIENT)
Dept: REHABILITATION | Facility: HOSPITAL | Age: 74
End: 2024-03-28
Payer: MEDICARE

## 2024-03-28 DIAGNOSIS — M54.42 ACUTE LEFT-SIDED LOW BACK PAIN WITH LEFT-SIDED SCIATICA: Primary | ICD-10-CM

## 2024-03-28 DIAGNOSIS — M54.42 ACUTE LEFT-SIDED LOW BACK PAIN WITH LEFT-SIDED SCIATICA: ICD-10-CM

## 2024-03-28 DIAGNOSIS — M54.50 ACUTE LEFT-SIDED LOW BACK PAIN WITHOUT SCIATICA: ICD-10-CM

## 2024-03-28 DIAGNOSIS — M25.511 ACUTE PAIN OF RIGHT SHOULDER: Primary | ICD-10-CM

## 2024-03-28 PROCEDURE — 97140 MANUAL THERAPY 1/> REGIONS: CPT | Mod: KX

## 2024-03-28 PROCEDURE — 97110 THERAPEUTIC EXERCISES: CPT | Mod: KX

## 2024-03-28 PROCEDURE — 97161 PT EVAL LOW COMPLEX 20 MIN: CPT | Mod: KX

## 2024-03-28 PROCEDURE — 97014 ELECTRIC STIMULATION THERAPY: CPT | Mod: KX

## 2024-03-28 PROCEDURE — 97530 THERAPEUTIC ACTIVITIES: CPT | Mod: KX

## 2024-03-28 NOTE — PLAN OF CARE
[facility]   Physical Therapy Initial Evaluation      Name: Anoop Harkins Sr.  Clinic Number: [id number]    Therapy Diagnosis: low back pain with L side sciatica        Physician: Keven Vazquez Jr., *    Physician Orders: PT Eval and Treat   Medical Diagnosis from Referral: acute low back pain  Evaluation Date: 3/28/2024  Authorization Period Expiration: 1/18/2025  Plan of Care Expiration: 4/19/2024  Progress Note Due: 4/19/2024   Date of Surgery: NA   Visit # / Visits authorized: 1/ 6   FOTO: to be completed on visit 4     Precautions: Standard     Time In: 13:15  Time Out: ***  Total Billable Time: *** minutes    Subjective     Date of onset: 2-3 weeks ago     History of current condition - Elio reports: ***    Falls: None     Imaging: none    Prior Therapy: Patient has complete PT at this facility in the past for low back pain with sciatica but not for this episode   Social History:  lives with their spouse  Occupation: business owner   Prior Level of Function: independent   Current Level of Function: independent     Pain:  Current 8/10, worst 10/10, best 0/10   Location: left low back, hip and thigh   Description: Sharp and Shooting  Aggravating Factors: Sitting, Standing, and Extension  Easing Factors: injection    Patients goals: ***     Medical History:   Past Medical History:   Diagnosis Date    Coronary artery disease     Depression     GERD (gastroesophageal reflux disease)     Gout     Hypertension        Surgical History:   Anoop Harkins Sr.  has a past surgical history that includes Shoulder surgery; Groin exploration; Hip surgery; Finger surgery; and Knee surgery.    Medications:   Anoop has a current medication list which includes the following prescription(s): allopurinol, aspirin, atorvastatin, cyclobenzaprine, diclofenac sodium, escitalopram oxalate, lisinopril, lisinopril-hydrochlorothiazide, omeprazole, and testosterone cypionate.    Allergies:   Review of patient's  allergies indicates:   Allergen Reactions    Phenergan plain Itching    Tetanus vaccines and toxoid     Demerol [meperidine] Hallucinations        Objective    Posture:  Standing lordosis: {amsincreasedecrease:54044}  Sitting lordosis: {amsincreasedecrease:26729}  Iliac crest height: {left/right:564120} {amsincreaseequal:85942}  PSIS height: {left/right:319662} {amsincreaseequal:76428}  Pelvic rotation/torsion: {YES/NO:20267}  Scoliosis: {YES/NO:20267}  Lateral shift: {RUSH DESC RIGHT/LEFT:91956}  Comments:    Forward bend:  Back bend:   Lateral trunk lean: right *** left ***  Trunk rotation: right *** left ***    MANUAL MUSCLE TEST  Right left   Hip flexion {MMT number:17888:::1} {MMT strength:17247:::1}   Hip abduction {MMT strength:57041:::1} {MMT strength:80303:::1}   Knee extension {MMT strength:31172:::1} {MMT strength:56275:::1}   Knee flexion  {MMT strength:40972:::1} {MMT strength:16303:::1}   Ankle dorsiflexion {MMT strength:92788:::1} {MMT strength:48350:::1}   Ankle plantar flexion  {MMT strength:43048:::1} {MMT strength:52088:::1}   Extensor hallucis longus {MMT strength:23159:::1} {MMT strength:18574:::1}     ROM/flexibility right left   Hip flexion (120) *** ***   Internal rotation (45) *** ***   External rotation (45) *** ***   Hamstring 90/90 (-10) *** ***   Rectus femoris (120) *** ***   Other *** ***   Other *** ***     Special test Right  Left    SLR test < 60 degrees {POSITIVE/NEGATIVE:98515} {POSITIVE/NEGATIVE:10083}   SLR test > 60 degrees {POSITIVE/NEGATIVE:72700} {POSITIVE/NEGATIVE:75621}   Sitting slump test {POSITIVE/NEGATIVE:40622} {POSITIVE/NEGATIVE:85054}   Piriformis test {POSITIVE/NEGATIVE:23122} {POSITIVE/NEGATIVE:23122}   NINO test {POSITIVE/NEGATIVE:23122} {POSITIVE/NEGATIVE:23122}   SI forward bend {POSITIVE/NEGATIVE:23122} {POSITIVE/NEGATIVE:23122}   SI distraction {POSITIVE/NEGATIVE:23122} {POSITIVE/NEGATIVE:23122}   SI compression {POSITIVE/NEGATIVE:23122}  {POSITIVE/NEGATIVE:22296}     Gait assessment:   Step length: {DESC INCREASED/DECREASED:25827} {RIGHT/LEFT:20294}  Step width: {DESC INCREASED/DECREASED:48979} {RIGHT/LEFT:20294}  Clarita: {DESC INCREASED/DECREASED:42748} {RIGHT/LEFT:20294}  Antalgic gait: {YES/NO:20267}  Assistive device: {Assistive Device:34468}    Spinal mobility:  Segment:     Other test/information:    Palpation:     Dermatome:     Intake Outcome Measure for FOTO Survey    Therapist reviewed FOTO scores for Anoop Harkins Sr. on 3/28/2024.   FOTO report - see Media section or FOTO account episode details.    Intake Score: ***%         Treatment     Total Treatment time (time-based codes) separate from Evaluation: *** minutes     Elio received the treatments listed below:      therapeutic exercises to develop {AMB PT PROGRESS OBJECTIVE:01486} for *** minutes including:  ***    manual therapy techniques: {AMB PT PROGRESS MANUAL THERAPY:14070} were applied to the: *** for *** minutes, including:  ***    supervised modalities after being cleared for contradictions: BIPHASIC ESTIM:  Anoop received  electrical stimulation for pain to the ***. Pt received burst mode at a rate of 2 pps for 10 minutes. Anoop tolerated treatment well without any adverse effects.     hot pack for 10 minutes to B lumbosacral muscles with ESTIM .    Patient Education and Home Exercises     Education provided:   - eval findings, plan of care, Home exercise program     Written Home Exercises Provided: yes. Exercises were reviewed and Elio was able to demonstrate them prior to the end of the session.  Elio demonstrated good  understanding of the education provided. See EMR under Patient Instructions for exercises provided during therapy sessions.    Assessment     Anoop is a 73 y.o. male referred to outpatient Physical Therapy with a medical diagnosis of acute low back pain . Patient presents with ***    Patient prognosis is Good.   Patient will benefit from skilled  outpatient Physical Therapy to address the deficits stated above and in the chart below, provide patient /family education, and to maximize patientt's level of independence.     Plan of care discussed with patient: Yes  Patient's spiritual, cultural and educational needs considered and patient is agreeable to the plan of care and goals as stated below:     Anticipated Barriers for therapy: ***    Medical Necessity is demonstrated by the following  History  Co-morbidities and personal factors that may impact the plan of care [x] LOW: no personal factors / co-morbidities  [] MODERATE: 1-2 personal factors / co-morbidities  [] HIGH: 3+ personal factors / co-morbidities    Moderate / High Support Documentation:   Co-morbidities affecting plan of care: NA    Personal Factors:   no deficits     Examination  Body Structures and Functions, activity limitations and participation restrictions that may impact the plan of care [x] LOW: addressing 1-2 elements  [] MODERATE: 3+ elements  [] HIGH: 4+ elements (please support below)    Moderate / High Support Documentation: NA     Clinical Presentation [x] LOW: stable  [] MODERATE: Evolving  [] HIGH: Unstable     Decision Making/ Complexity Score: low       Goals:  Short Term Goals: 2 weeks   Patient will independently complete Home exercise program with correct form.       Long Term Goals: *** weeks   ***  Plan     Plan of care Certification: 3/28/2024 to 4/19/2024.    Outpatient Physical Therapy 2 times weekly for 3 weeks to include the following interventions: Electrical Stimulation unattended, Manual Therapy, Moist Heat/ Ice, Patient Education, Therapeutic Activities, Therapeutic Exercise, and Ultrasound.     Mickey Bustillos, PT, DPT         Physician's Signature: _________________________________________ Date: ________________

## 2024-03-28 NOTE — PROGRESS NOTES
"[facility]   Physical Therapy Treatment Note      Name: Anoop Harkins Sr.  Clinic Number: [id number]    Visit Date: 3/28/2024  Therapy Diagnosis:        Encounter Diagnoses   Name Primary?    S/P right rotator cuff repair      Acute pain of right shoulder Yes        Physician: Keven Vazquez Jr., *     Physician Orders: PT Eval and Treat   Medical Diagnosis from Referral: R rotator cuff repair and subacromial debridement   Evaluation Date: 1/19/2024  Authorization Period Expiration: 1/18/2025  Plan of Care Expiration: 4/10/2024  Progress Note Due: 4/10/2024  Date of Surgery: 1/18/2024  Visit # / Visits authorized: 29/36   FOTO: to be completed on visit 30     Precautions: Standard      Time In: 12:30   Time Out: 13:13  Total Billable Time: 43 minutes     PTA Visit #: 0/5  Subjective   Patient reports: " My shoulder doesn't bother me."     He was compliant with home exercise program.  Response to previous treatment: No adverse effects reported   Functional change:  increased active range of motion with less pain     Pain: 0/10   Location: right shoulder    Objective    Objective Measures updated at progress report unless specified.   Treatment   Elio received the treatments listed below:      therapeutic exercises to develop strength, endurance, ROM, and posture.  Therapeutic activities to improve functional performance .See flowsheet below   *indicates increases in reps or resistance during this treatment session    Ther-Ex Reps    UBE 3/3   Standing ball rolls flexion 10 x 5" each    Scap Retractions 30 x 3"    Finger Ladder 5 x 10"    Cane flexion stretch 5 x 10"    Bicep Curls  2 x 10 2 #   IR with pulleys  3 minutes   Therapeutic-Activities  Reps    I,Y,T's 20 x 1# each    Prone Rows   2 x 10, 2#    Prone ABD  2 x 10   Prone Extension  2 x 10, 2#    TB IR and ER  2 x 10 red TB    Standing PNF patterns  10 x each red TB    Rows with Tband  2 x 10 green TB    B extension with Tband  2 " x 10 green TB    Spider génesis  5 x up and down red TB *   Wall Clock  5 x red TB *             Patient Education and Home Exercises       Education provided:   - Plan of Care, Delayed onset muscle soreness, Home exercise program     Written Home Exercises Provided: Patient instructed to cont prior HEP. Exercises were reviewed and Elio was able to demonstrate them prior to the end of the session.  Elio demonstrated fair  understanding of the education provided. See Electronic Medical Record under Patient Instructions for exercises provided during therapy sessions    Assessment     Anoop is a 73 y.o. male referred to outpatient Physical Therapy with a medical diagnosis of R rotator cuff repair and subacromial debridement. Patient presents with R shoulder pain, decreased R shoulder ROM, decreased R UE muscle strength and motor control. Patient's impairments are currently limiting his use of R UE for ADLs and patient is R hand dominant. Patient progressed through treatment tasks with minimal cues for speed and improved scapular stability. Patient demonstrates improving R upper extremity strength and endurance requiring only minimal rest breaks. No adverse effects noted to treatment.      Patient's prognosis is good.   Patient will continue to benefit from skilled outpatient physical therapy to address the deficits listed in the problem list box on initial evaluation, provide pt/family education and to maximize pt's level of independence in the home and community environment.     Patient's spiritual, cultural and educational needs considered and pt agreeable to plan of care and goals.     Anticipated barriers to physical therapy:  compliance with Home exercise program and post surgical restrictions     Goals:   Short Term Goals: 3 weeks   Patient will independently complete Home exercise program with correct form.   Patient will report R shoulder pain less than or equal to 3/10 for improved quality of life.      Long  Term Goals: 6 weeks   Pt will increase R shoulder flexion to 120 degrees, external rotation to C4, and internal rotation to L2 for independent dressing  Pt will increase R upper extremity to 4/5 to allow patient to perform activities of daily living independently  Pt will report decrease in pain to 1/10 to improve quality of life.  Pt will place 5 pound object in overhead shelf without hike and with less than or equal to 2/10 pain to allow patient to return to prior level of function   Patient will have increased FOTO score to greater than or equal to 65% for improved function.     Plan   Plan of care Certification: 1/19/2024 to 3/1/2024.     Outpatient Physical Therapy 3 times weekly for 6 weeks to include the following interventions: Electrical Stimulation unattended, Manual Therapy, Moist Heat/ Ice, Neuromuscular Re-ed, Patient Education, Therapeutic Activities, Therapeutic Exercise, Ultrasound, and Vasopneumatic device.     Continue Plan of Care per PT order to progress patient toward rehab goals as tolerated by patient.   Mickey Bustillos, PT, DPT      3/28/2024

## 2024-03-28 NOTE — PLAN OF CARE
[facility]   Physical Therapy Initial Evaluation      Name: Anoop Harkins Sr.  Clinic Number: [id number]    Therapy Diagnosis: low back pain with L side sciatica        Physician: Keven Vazquez Jr., *    Physician Orders: PT Eval and Treat   Medical Diagnosis from Referral: acute low back pain  Evaluation Date: 3/28/2024  Authorization Period Expiration: 1/18/2025  Plan of Care Expiration: 4/19/2024  Progress Note Due: 4/19/2024   Date of Surgery: NA   Visit # / Visits authorized: 1/ 6   FOTO: to be completed on visit 4     Precautions: Standard     Time In: 13:15  Time Out: 14:15  Total Billable Time: 60 minutes    Subjective     Date of onset: 2-3 weeks ago     History of current condition - Elio reports: onset of low back and L hip pain about 2-3 weeks ago that has been flaring up off an on. He that pain initially started after a long car ride during which he felt like he just could not get comfortable. He describes his pain as stabbing and shooting pain into his L hip and thigh that is made worse when he transfers sit to stand, stands with his weight on his L leg, or L hip extension activities. Patient reports that he was given two shots and a prescription for pain medication earlier this week that helped his pain some initially but his pain is elevated again today. Patient has a history of episodes of low back in the past and has received treatment at this facility in the past that successfully helped decrease his pain.     Falls: None     Imaging: none    Prior Therapy: Patient has complete PT at this facility in the past for low back pain with sciatica but not for this episode   Social History:  lives with their spouse  Occupation: business owner   Prior Level of Function: independent   Current Level of Function: independent     Pain:  Current 8/10, worst 10/10, best 0/10   Location: left low back, hip and thigh   Description: Sharp and Shooting  Aggravating Factors: Sitting,  "Standing, and Extension  Easing Factors: injection    Patients goals: "to be able to ride my horse."     Medical History:   Past Medical History:   Diagnosis Date    Coronary artery disease     Depression     GERD (gastroesophageal reflux disease)     Gout     Hypertension        Surgical History:   Anoop Harkins Sr.  has a past surgical history that includes Shoulder surgery; Groin exploration; Hip surgery; Finger surgery; and Knee surgery.    Medications:   Anoop has a current medication list which includes the following prescription(s): allopurinol, aspirin, atorvastatin, cyclobenzaprine, diclofenac sodium, escitalopram oxalate, lisinopril, lisinopril-hydrochlorothiazide, omeprazole, and testosterone cypionate.    Allergies:   Review of patient's allergies indicates:   Allergen Reactions    Phenergan plain Itching    Tetanus vaccines and toxoid     Demerol [meperidine] Hallucinations        Objective    Posture:  Standing lordosis: Decreased  Sitting lordosis: Decreased  Iliac crest height: left increased  PSIS height: left increased  Pelvic rotation/torsion: Yes increased L anterior rotation, L pelvic inflare   Scoliosis: No    Patient has decreased lumbar flexion by 25%,  extension by 50%, and decreased L lateral flexion by 50%     MANUAL MUSCLE TEST  Right left   Hip flexion MMT number: 4+/5 MMT strength: 4/5   Hip extension  MMT number: 4+/5 MMT number: 3/5   Hip abduction MMT strength: 4+/5 MMT strength: 4/5   Knee extension MMT strength: 4+/5 MMT strength: 4+/5   Knee flexion  MMT strength: 4+/5 MMT strength: 4+/5   Ankle dorsiflexion MMT strength: 4+/5 MMT strength: 4+/5   Ankle plantar flexion  MMT strength: 4+/5 MMT strength: 4+/5   Extensor hallucis longus MMT strength: 4+/5 MMT strength: 4+/5     ROM/flexibility right left   Hip flexion (120) WFL WFL   Internal rotation (45) WFL Decreased by 50%    External rotation (45) WFL Decreased by 25%    Hamstring 90/90 (-10) -10 -15     Special test Right  " "Left    SLR test < 60 degrees Negative Negative   SLR test > 60 degrees Negative Negative   Sitting slump test Negative Negative   Piriformis test Negative Positive   NINO test Negative Positive   SI forward bend Negative Positive    SI distraction Negative Negative   SI compression Negative Positive   Gaenslen Test  Negative  Positive    Hip Scour  Negative  Negative    Single leg stance  Positive  Negative      Gait assessment:   Step length: decreased right  Decreased L hip extension and stance time   Antalgic gait: Yes  Assistive device: none    Spinal mobility:  Segment: Decreased sacral mobility with posterior and anterior mobilizations with increased pain with sacral flexion   No change in symptoms with lumbar posterior/anterior mobilizations     Palpation: Increased tissue tightness in L quadratus lumborum, B lumbar paraspinals, L piriformis and L glute medius.  Tissue irritation with palpation of L piriformis and R lumbar paraspinals     Dermatome: Patient denies sensory changes.     Intake Outcome Measure for FOTO Survey    Therapist reviewed FOTO scores for Anoop Harkins  on 3/28/2024.   FOTO report - see Media section or FOTO account episode details.    Intake Score: 43%         Treatment   Total Treatment time (time-based codes) separate from Evaluation: 40 minutes     Elio received the treatments listed below:      therapeutic exercises to develop ROM, flexibility, posture, and core stabilization for 8 minutes including: See flowsheet below     Ther-Ex Reps    Nustep    Wedge    Hamstring Stretch    Posterior Pelvic Tilts    Lower Trunk Rotations    Single Knee to Chest Stretch 5 x 10" each    Piriformis Stretch 2 x 20" each    Figure 4 Stretch 2 x 20" Each    Hip Adduction    Hip Abduction                         manual therapy techniques: Joint mobilizations, Soft tissue Mobilization, and active neuromuscular releases were applied to the: L quadratus lumborum, B lumbar paraspinals, L piriformis, L " innominate, L SI joint, and sacrum for 22 minutes to decrease tissue tightness, improve joint mobility, improve pelvic alignment, and decrease pain.     supervised modalities after being cleared for contradictions: BIPHASIC ESTIM:  Anoop received  electrical stimulation for pain to the 10. Pt received burst mode at a rate of 2 pps for 10 minutes. Anoop tolerated treatment well without any adverse effects.     hot pack for 10 minutes to B lumbosacral muscles with ESTIM .    Patient Education and Home Exercises     Education provided:   - eval findings, plan of care, Home exercise program     Written Home Exercises Provided: yes. Exercises were reviewed and Elio was able to demonstrate them prior to the end of the session.  Elio demonstrated good  understanding of the education provided. See EMR under Patient Instructions for exercises provided during therapy sessions.    Assessment   Anoop is a 73 y.o. male referred to outpatient Physical Therapy with a medical diagnosis of acute low back pain . Patient presents with low back and L hip pain, decreased core and lower extremity muscle weakness, tissue tightness, impaired movement mechanics, and decreased motor control. Patient's impairments are currently limiting his overall mobility and activity tolerance.     PT completed manual therapy interventions to improve low back pain and L hip symptoms. PT noted improved L hip mobility and patient with ability to lift L hip into extension following manual therapy interventions. PT also noted improved joint mobility, pelvic alignment, and tissue extensibility following manual interventions. PT provided verbal, visual and tactile cues for proper form and hold times with stretches. Patient had no reports of adverse effects to treatment.     Patient prognosis is Good.   Patient will benefit from skilled outpatient Physical Therapy to address the deficits stated above and in the chart below, provide patient /family education,  and to maximize patientt's level of independence.     Plan of care discussed with patient: Yes  Patient's spiritual, cultural and educational needs considered and patient is agreeable to the plan of care and goals as stated below:     Anticipated Barriers for therapy: chronicity of low back pain episodes, compliance with Home exercise program, Patient's high activity levels     Medical Necessity is demonstrated by the following  History  Co-morbidities and personal factors that may impact the plan of care [x] LOW: no personal factors / co-morbidities  [] MODERATE: 1-2 personal factors / co-morbidities  [] HIGH: 3+ personal factors / co-morbidities    Moderate / High Support Documentation:   Co-morbidities affecting plan of care: NA    Personal Factors:   no deficits     Examination  Body Structures and Functions, activity limitations and participation restrictions that may impact the plan of care [x] LOW: addressing 1-2 elements  [] MODERATE: 3+ elements  [] HIGH: 4+ elements (please support below)    Moderate / High Support Documentation: NA     Clinical Presentation [x] LOW: stable  [] MODERATE: Evolving  [] HIGH: Unstable     Decision Making/ Complexity Score: low       Goals:  Short Term Goals: 2 weeks   Patient will independently complete Home exercise program with correct form.   Patient will report decreased pain to less than or equal to 6/10 for improved quality of life.        Long Term Goals: 3 weeks   Patient will report decreased pain to less than or equal to 3/10 for improved quality of life.   Patient will have increased B lower extremity muscle strength to greater than or equal to 4+/5 for improved gait mechanics.   Patient will have increased FOTO score to greater than or equal to 65% for improved function.   Patient will have increased B hamstring length to less than or equal to -5 degrees.   Patient will report decreased pain to less than or equal to 3/10 with sit to stand transfer for improved  quality of life.     Plan     Plan of care Certification: 3/28/2024 to 4/19/2024.    Outpatient Physical Therapy 2 times weekly for 3 weeks to include the following interventions: Electrical Stimulation unattended, Manual Therapy, Moist Heat/ Ice, Patient Education, Therapeutic Activities, Therapeutic Exercise, and Ultrasound.     Mickey Bustillos PT, DPT         Physician's Signature: _________________________________________ Date: ________________

## 2024-04-01 ENCOUNTER — CLINICAL SUPPORT (OUTPATIENT)
Dept: REHABILITATION | Facility: HOSPITAL | Age: 74
End: 2024-04-01
Payer: MEDICARE

## 2024-04-01 DIAGNOSIS — M25.511 ACUTE PAIN OF RIGHT SHOULDER: Primary | ICD-10-CM

## 2024-04-01 DIAGNOSIS — R52 PAIN: ICD-10-CM

## 2024-04-01 DIAGNOSIS — M54.42 ACUTE LEFT-SIDED LOW BACK PAIN WITH LEFT-SIDED SCIATICA: ICD-10-CM

## 2024-04-01 PROCEDURE — 97530 THERAPEUTIC ACTIVITIES: CPT | Mod: CQ

## 2024-04-01 PROCEDURE — 97014 ELECTRIC STIMULATION THERAPY: CPT | Mod: CQ

## 2024-04-01 PROCEDURE — 97140 MANUAL THERAPY 1/> REGIONS: CPT | Mod: KX,CQ

## 2024-04-01 PROCEDURE — 97110 THERAPEUTIC EXERCISES: CPT | Mod: KX,CQ

## 2024-04-01 NOTE — PROGRESS NOTES
"[facility]   Physical Therapy Treatment Note      Name: Anoop Harkins Sr.  Clinic Number: [id number]    Visit Date: 4/1/2024  Therapy Diagnosis:        Encounter Diagnoses   Name Primary?    S/P right rotator cuff repair      Acute pain of right shoulder Yes        Physician: Keven Vazquez Jr., *     Physician Orders: PT Eval and Treat   Medical Diagnosis from Referral: R rotator cuff repair and subacromial debridement   Evaluation Date: 1/19/2024  Authorization Period Expiration: 1/18/2025  Plan of Care Expiration: 4/10/2024  Progress Note Due: 4/10/2024  Date of Surgery: 1/18/2024  Visit # / Visits authorized: 30/36   FOTO: to be completed on visit 2     Precautions: Standard      Time In: 8:03  Time Out: 8:47  Total Billable Time: 44 minutes     PTA Visit #: 1/5  Subjective   Patient reports:  No new complaints.  States "My shoulder is getting there".       He was compliant with home exercise program.  Response to previous treatment: No adverse effects reported   Functional change:  increased active range of motion with less pain     Pain: 0/10   Location: right shoulder      Objective    Objective Measures updated at progress report unless specified.   Treatment   Elio received the treatments listed below:      therapeutic exercises to develop strength, endurance, ROM, and posture.  Therapeutic activities to improve functional performance .See flowsheet below   *indicates increases in reps or resistance during this treatment session       Ther-Ex Reps    UBE 3/3   Standing ball rolls flexion 10 x 5" each    Finger Ladder 5 x 10"    Cane flexion stretch 5 x 10"    Bicep Curls  2 x 10 4 #   IR with pulleys  3 minutes   Therapeutic-Activities  Reps    I,Y,T's 20 x 1# each    Prone Rows   2 x 10, 2#    Prone ABD  2 x 10   Prone Extension  2 x 10, 2#    TB IR and ER  3 x 10 green TB *   Standing PNF patterns  10 x each red TB    Rows with Tband  2 x 10 green  TB *   B extension with " Tband  2 x 10 green TB *   Spider génesis  3 x up and down red TB    Wall Clock  3 x red TB            Not completed: supervised modalities after being cleared for contradictions: IFC Electrical Stimulation:  Anoop received  Electrical Stimulation for tissue irritation  x 10 minutes applied to the R shoulder .  Anoop tolderated treatment well without any adverse effects.      Not completed: cold pack for 10 minutes to Right shoulder with ESTIM      Patient Education and Home Exercises       Education provided:   - Plan of Care, Delayed onset muscle soreness, Home exercise program     Written Home Exercises Provided: Patient instructed to cont prior HEP. Exercises were reviewed and Elio was able to demonstrate them prior to the end of the session.  Elio demonstrated fair  understanding of the education provided. See Electronic Medical Record under Patient Instructions for exercises provided during therapy sessions    Assessment     Anoop is a 73 y.o. male referred to outpatient Physical Therapy with a medical diagnosis of R rotator cuff repair and subacromial debridement. Patient presents with R shoulder pain, decreased R shoulder ROM, decreased R UE muscle strength and motor control. Patient's impairments are currently limiting his use of R UE for ADLs and patient is R hand dominant. LPTA provided pt with visual and verbal cueing for progression through exercises with improved stability and decreased compensations. Patient continues to demonstrate min shoulder hiking with active abduction.  LPTA increased resistance of Therapeutic exercises as tolerated by patient.  Patient's functional ROM goals are met.         Patient's prognosis is good.   Patient will continue to benefit from skilled outpatient physical therapy to address the deficits listed in the problem list box on initial evaluation, provide pt/family education and to maximize pt's level of independence in the home and community environment.     Patient's  spiritual, cultural and educational needs considered and pt agreeable to plan of care and goals.     Anticipated barriers to physical therapy:  compliance with Home exercise program and post surgical restrictions     Goals:   Short Term Goals: 3 weeks   Patient will independently complete Home exercise program with correct form.   Patient will report R shoulder pain less than or equal to 3/10 for improved quality of life.      Long Term Goals: 6 weeks   Pt will increase R shoulder flexion to 120 degrees, external rotation to C4, and internal rotation to L2 for independent dressing  Pt will increase R upper extremity to 4/5 to allow patient to perform activities of daily living independently  Pt will report decrease in pain to 1/10 to improve quality of life.  Pt will place 5 pound object in overhead shelf without hike and with less than or equal to 2/10 pain to allow patient to return to prior level of function   Patient will have increased FOTO score to greater than or equal to 65% for improved function.     Plan   Plan of care Certification: 1/19/2024 to 3/1/2024.     Outpatient Physical Therapy 3 times weekly for 6 weeks to include the following interventions: Electrical Stimulation unattended, Manual Therapy, Moist Heat/ Ice, Neuromuscular Re-ed, Patient Education, Therapeutic Activities, Therapeutic Exercise, Ultrasound, and Vasopneumatic device.     Continue Plan of Care per PT order to progress patient toward rehab goals as tolerated by patient.   AUSTYN Ortiz   4/1/2024

## 2024-04-01 NOTE — PROGRESS NOTES
"[facility]   Physical Therapy Treatment Note      Name: Anoop Harkins Sr.  Clinic Number: [id number]    Therapy Diagnosis: low back pain with L side sciatica        Physician: Keven Vazquez Jr., *     Physician Orders: PT Eval and Treat   Medical Diagnosis from Referral: acute low back pain  Evaluation Date: 3/28/2024  Authorization Period Expiration: 1/18/2025  Plan of Care Expiration: 4/19/2024  Progress Note Due: 4/19/2024   Date of Surgery: NA   Visit # / Visits authorized: 2/ 6   FOTO: to be completed on visit 4      Precautions: Standard      Time In: 8:48  Time Out: 9:50  Total Billable Time: 62 minutes   Visit Date: 4/1/2024    PTA Visit #: 1/5       Subjective     Patient reports: "I got some shots and she worked on me when I was here.  It was a little better over the weekend, but I still have to be real careful the way I walk or it catches on me".   .  He was compliant with home exercise program.  Response to previous treatment: Decreased symptoms of low back and hip pain initially   Functional change: Early in Plan of Care     Pain: 6/10  Location: bilateral back      Objective      Objective Measures updated at progress report unless specified.     Treatment     Elio received the treatments listed below:      therapeutic exercises to develop ROM, flexibility, posture, and core stabilization for 8 minutes including: See flowsheet below      Ther-Ex Reps    Nustep  8 minutes    Wedge  2 minutes    Hamstring Stretch  2 x 20"    Posterior Pelvic Tilts  20 x 3"    Lower Trunk Rotations 5 x 10 "    Single Knee to Chest Stretch 5 x 10" each    Piriformis Stretch 2 x 20" each    Figure 4 Stretch 2 x 20" Each    Hip Adduction     Hip Abduction                                    manual therapy techniques: Joint mobilizations, Soft tissue Mobilization, and active neuromuscular releases were applied to the: L quadratus lumborum, B lumbar paraspinals, L piriformis, L innominate, L SI " joint, and sacrum  to decrease tissue tightness, improve joint mobility, improve pelvic alignment, and decrease pain.      supervised modalities after being cleared for contradictions: BIPHASIC ESTIM:  Anoop received  electrical stimulation for pain to the 10. Pt received burst mode at a rate of 2 pps for 10 minutes. Anoop tolerated treatment well without any adverse effects.      hot pack for 10 minutes to B lumbosacral muscles with ESTIM .     Patient Education and Home Exercises      Education provided:   - eval findings, plan of care, Home exercise program      Written Home Exercises Provided: yes. Exercises were reviewed and Elio was able to demonstrate them prior to the end of the session.  Elio demonstrated good  understanding of the education provided. See EMR under Patient Instructions for exercises provided during therapy sessions.  Patient Education and Home Exercises       Education provided:   - Plan of Care, Home exercise program, Delayed onset muscle soreness     Written Home Exercises Provided: Patient instructed to cont prior HEP. Exercises were reviewed and Elio was able to demonstrate them prior to the end of the session.  Elio demonstrated good  understanding of the education provided. See Electronic Medical Record under Patient Instructions for exercises provided during therapy sessions    Assessment     Anoop is a 73 y.o. male referred to outpatient Physical Therapy with a medical diagnosis of acute low back pain . Patient presents with low back and L hip pain, decreased core and lower extremity muscle weakness, tissue tightness, impaired movement mechanics, and decreased motor control. Patient's impairments are currently limiting his overall mobility and activity tolerance.  Patient requires mod verbal and visual cues to progress through completion of exercises with proper alignment, speed of movement, count, and hold times.  Patient reports marked tenderness during manual therapy technique  application, and pain in low back and hips decreased to 4/10 at end of physical therapy treatment session.     Elio Is progressing well towards his goals.   Patient prognosis is Good.     Patient will continue to benefit from skilled outpatient physical therapy to address the deficits listed in the problem list box on initial evaluation, provide pt/family education and to maximize pt's level of independence in the home and community environment.     Patient's spiritual, cultural and educational needs considered and pt agreeable to plan of care and goals.     Anticipated barriers to physical therapy: chronicity of low back pain episodes, compliance with Home exercise program, Patient's high activity levels     Goals:   Short Term Goals: 2 weeks   Patient will independently complete Home exercise program with correct form.   Patient will report decreased pain to less than or equal to 6/10 for improved quality of life.                    Long Term Goals: 3 weeks   Patient will report decreased pain to less than or equal to 3/10 for improved quality of life.   Patient will have increased B lower extremity muscle strength to greater than or equal to 4+/5 for improved gait mechanics.   Patient will have increased FOTO score to greater than or equal to 65% for improved function.   Patient will have increased B hamstring length to less than or equal to -5 degrees.   Patient will report decreased pain to less than or equal to 3/10 with sit to stand transfer for improved quality of life.     Plan     Plan of care Certification: 3/28/2024 to 4/19/2024.  Outpatient Physical Therapy 2 times weekly for 3 weeks to include the following interventions: Electrical Stimulation unattended, Manual Therapy, Moist Heat/ Ice, Patient Education, Therapeutic Activities, Therapeutic Exercise, and Ultrasound.     Continue Plan of Care per PT order to progress patient toward rehab goals as tolerated by patient.   Karen Marshall, PTA   4/1/2024

## 2024-04-01 NOTE — PROGRESS NOTES
"[facility]   Physical Therapy Treatment Note      Name: Anoop Harkins Sr.  Clinic Number: [id number]    Therapy Diagnosis:   Encounter Diagnoses   Name Primary?    Acute pain of right shoulder Yes    Acute left-sided low back pain with left-sided sciatica      Physician: Keven Vazquez Jr., *    Visit Date: 4/1/2024    [copy and paste header from eval here including time in/out and billable time]    PTA Visit #: ***/5       Subjective     Patient reports: ***.  He {Actions; was/was not:33503} compliant with home exercise program.  Response to previous treatment: ***  Functional change: ***    Pain: {0-10:14015::"0"}/10  Location: {RIGHT/LEFT/BILATERAL:01204} {LOCATION ON BODY:71490}     Objective      Objective Measures updated at progress report unless specified.     Treatment     Elio received the treatments listed below:      therapeutic exercises to develop {AMB PT PROGRESS OBJECTIVE:62698} for *** minutes including:  ***    manual therapy techniques: {AMB PT PROGRESS MANUAL THERAPY:98189} were applied to the: *** for *** minutes, including:  ***    neuromuscular re-education activities to improve: {AMB PT PROGRESS NEURO RE-ED:97322} for *** minutes. The following activities were included:  ***    therapeutic activities to improve functional performance for ***  minutes, including:  ***    gait training to improve functional mobility and safety for ***  minutes, including:  ***    direct contact modalities after being cleared for contraindications: {AMB PT PROGRESS DIRECT CONTACT MODES:92119}    supervised modalities after being cleared for contradictions: {AMB PT SUPERVISED MODES:18371}    hot pack for *** minutes to ***.    cold pack for *** minutes to ***.    Patient Education and Home Exercises       Education provided:   - ***    Written Home Exercises Provided: {Blank single:03707::"yes","Patient instructed to cont prior HEP"}. Exercises were reviewed and Elio was able to " demonstrate them prior to the end of the session.  Elio demonstrated {Desc; good/fair/poor:89966} understanding of the education provided. See Electronic Medical Record under Patient Instructions for exercises provided during therapy sessions    Assessment     ***    Elio {IS/IS NOT:24153} progressing well towards his goals.   Patient prognosis is {REHAB PROGNOSIS OHS:59692}.     Patient will continue to benefit from skilled outpatient physical therapy to address the deficits listed in the problem list box on initial evaluation, provide pt/family education and to maximize pt's level of independence in the home and community environment.     Patient's spiritual, cultural and educational needs considered and pt agreeable to plan of care and goals.     Anticipated barriers to physical therapy: ***    Goals: ***    Plan     ***    Karen Marshall, PTA

## 2024-04-05 ENCOUNTER — CLINICAL SUPPORT (OUTPATIENT)
Dept: REHABILITATION | Facility: HOSPITAL | Age: 74
End: 2024-04-05
Payer: MEDICARE

## 2024-04-05 DIAGNOSIS — R52 PAIN: Primary | ICD-10-CM

## 2024-04-05 DIAGNOSIS — M25.511 ACUTE PAIN OF RIGHT SHOULDER: ICD-10-CM

## 2024-04-05 DIAGNOSIS — M54.42 ACUTE LEFT-SIDED LOW BACK PAIN WITH LEFT-SIDED SCIATICA: ICD-10-CM

## 2024-04-05 PROCEDURE — 97110 THERAPEUTIC EXERCISES: CPT | Mod: CQ

## 2024-04-05 PROCEDURE — 97140 MANUAL THERAPY 1/> REGIONS: CPT | Mod: KX,CQ

## 2024-04-05 PROCEDURE — 97014 ELECTRIC STIMULATION THERAPY: CPT | Mod: CQ

## 2024-04-05 NOTE — PROGRESS NOTES
"[facility]   Physical Therapy Treatment Note      Name: Anoop Harkins Sr.  Clinic Number: [id number]    Visit Date: 4/5/2024  Therapy Diagnosis:        Encounter Diagnoses   Name Primary?    S/P right rotator cuff repair      Acute pain of right shoulder Yes        Physician: Keven Vazquez Jr., *     Physician Orders: PT Eval and Treat   Medical Diagnosis from Referral: R rotator cuff repair and subacromial debridement   Evaluation Date: 1/19/2024  Authorization Period Expiration: 1/18/2025  Plan of Care Expiration: 4/10/2024  Progress Note Due: 4/10/2024  Date of Surgery: 1/18/2024  Visit # / Visits authorized: 31/36   FOTO: to be completed on visit 36     Precautions: Standard      Time In: 8:00  Time Out: 8:44  Total Billable Time: 44 minutes     PTA Visit #: 2/5  Subjective   Patient reports: "My shoulder is doing good".        He was compliant with home exercise program.  Response to previous treatment: No adverse effects reported   Functional change:  increased active range of motion with less pain     Pain: 0/10   Location: right shoulder      Objective    Objective Measures updated at progress report unless specified.   Treatment   Elio received the treatments listed below:      therapeutic exercises to develop strength, endurance, ROM, and posture.  Therapeutic activities to improve functional performance .See flowsheet below   *indicates increases in reps or resistance during this treatment session       Ther-Ex Reps    UBE 3/3   Standing ball rolls flexion 10 x 5" each    Finger Ladder 5 x 10"    Cane flexion stretch 5 x 10"    Bicep Curls  2 x 10 4 #   IR with pulleys  3 minutes   Therapeutic-Activities  Reps    I,Y,T's 20 x 1# each    Prone Rows   2 x 10, 2#    Prone ABD  2 x 10   Prone Extension  2 x 10, 2#    TB IR and ER  3 x 10 green TB *   Standing PNF patterns  10 x each red TB    Rows with Tband  2 x 10 green  TB *   B extension with Tband  2 x 10 green TB *   Spider " génesis  3 x up and down red TB    Wall Clock  3 x red TB    ER walk outs with TB  2 x 10 red *    IR walk outs with TB  2 x 10 red *        Patient Education and Home Exercises       Education provided:   - Plan of Care, Delayed onset muscle soreness, Home exercise program     Written Home Exercises Provided: Patient instructed to cont prior HEP. Exercises were reviewed and Elio was able to demonstrate them prior to the end of the session.  Elio demonstrated fair  understanding of the education provided. See Electronic Medical Record under Patient Instructions for exercises provided during therapy sessions    Assessment     Anoop is a 73 y.o. male referred to outpatient Physical Therapy with a medical diagnosis of R rotator cuff repair and subacromial debridement. Patient presents with R shoulder pain, decreased R shoulder ROM, decreased R UE muscle strength and motor control. Patient's impairments are currently limiting his use of R UE for ADLs and patient is R hand dominant. LPTA provided pt with visual and verbal cueing for progression through exercises with improved stability and decreased compensations. Patient continues to demonstrate min shoulder hiking with active abduction.  LPTA added IR/ER walkouts with Thera band to increase Right shoulder and Right scapula stability. Patient's functional ROM goals are met.         Patient's prognosis is good.   Patient will continue to benefit from skilled outpatient physical therapy to address the deficits listed in the problem list box on initial evaluation, provide pt/family education and to maximize pt's level of independence in the home and community environment.     Patient's spiritual, cultural and educational needs considered and pt agreeable to plan of care and goals.     Anticipated barriers to physical therapy:  compliance with Home exercise program and post surgical restrictions     Goals:   Short Term Goals: 3 weeks   Patient will independently complete Home  exercise program with correct form.   Patient will report R shoulder pain less than or equal to 3/10 for improved quality of life.      Long Term Goals: 6 weeks   Pt will increase R shoulder flexion to 120 degrees, external rotation to C4, and internal rotation to L2 for independent dressing  Pt will increase R upper extremity to 4/5 to allow patient to perform activities of daily living independently  Pt will report decrease in pain to 1/10 to improve quality of life.  Pt will place 5 pound object in overhead shelf without hike and with less than or equal to 2/10 pain to allow patient to return to prior level of function   Patient will have increased FOTO score to greater than or equal to 65% for improved function.     Plan   Plan of care Certification: 1/19/2024 to 3/1/2024.     Outpatient Physical Therapy 3 times weekly for 6 weeks to include the following interventions: Electrical Stimulation unattended, Manual Therapy, Moist Heat/ Ice, Neuromuscular Re-ed, Patient Education, Therapeutic Activities, Therapeutic Exercise, Ultrasound, and Vasopneumatic device.     Continue Plan of Care per PT order to progress patient toward rehab goals as tolerated by patient.   AUSTYN Ortiz   4/5/2024

## 2024-04-05 NOTE — PROGRESS NOTES
"[facility]   Physical Therapy Treatment Note      Name: Anoop Harkins Sr.  Clinic Number: [id number]    Therapy Diagnosis: low back pain with L side sciatica        Physician: Keven Vazquez Jr., *     Physician Orders: PT Eval and Treat   Medical Diagnosis from Referral: acute low back pain  Evaluation Date: 3/28/2024  Authorization Period Expiration: 1/18/2025  Plan of Care Expiration: 4/19/2024  Progress Note Due: 4/19/2024   Date of Surgery: NA   Visit # / Visits authorized: 3/ 6   FOTO: to be completed on visit 4      Precautions: Standard      Time In: 8:45  Time Out: 9:30  Total Billable Time: 45 minutes   Visit Date: 4/5/2024    PTA Visit #: 2/5       Subjective     Patient reports: "I feel like I am walking better but it still feels like something sharp is sticking in my hip when I put pressure on my foot "  .  He was compliant with home exercise program.  Response to previous treatment: Decreased symptoms of low back and hip pain initially   Functional change: Early in Plan of Care     Pain: 6/10  Location: bilateral back      Objective      Objective Measures updated at progress report unless specified.     Treatment     Elio received the treatments listed below:      therapeutic exercises to develop ROM, flexibility, posture, and core stabilization.  See flowsheet below      Ther-Ex Reps    Nustep  8 minutes (not completed)    Wedge  2 minutes    Hamstring Stretch  2 x 20"    Posterior Pelvic Tilts  20 x 3"    Lower Trunk Rotations 5 x 10 "    Single Knee to Chest Stretch 5 x 10" each    Piriformis Stretch 2 x 20" each    Figure 4 Stretch 2 x 20" Each    Hip Adduction     Hip Abduction      Manual Hip flexor stretch  2 x 20" each LE   Manual Hamstring stretch  2 x 20" each LE                      manual therapy techniques: Joint mobilizations, Soft tissue Mobilization, and active neuromuscular releases were applied to the: L quadratus lumborum, B lumbar paraspinals, L " piriformis, L innominate, L SI joint, and sacrum  to decrease tissue tightness, improve joint mobility, improve pelvic alignment, and decrease pain.      supervised modalities after being cleared for contradictions: BIPHASIC ESTIM:  Anoop received  electrical stimulation for pain to the 10. Pt received burst mode at a rate of 2 pps for 10 minutes. Anoop tolerated treatment well without any adverse effects.      hot pack for 10 minutes to B lumbosacral muscles with ESTIM .     Patient Education and Home Exercises      Education provided:   - eval findings, plan of care, Home exercise program      Written Home Exercises Provided: yes. Exercises were reviewed and Elio was able to demonstrate them prior to the end of the session.  Elio demonstrated good  understanding of the education provided. See EMR under Patient Instructions for exercises provided during therapy sessions.  Patient Education and Home Exercises       Education provided:   - Plan of Care, Home exercise program, Delayed onset muscle soreness     Written Home Exercises Provided: Patient instructed to cont prior HEP. Exercises were reviewed and Elio was able to demonstrate them prior to the end of the session.  Elio demonstrated good  understanding of the education provided. See Electronic Medical Record under Patient Instructions for exercises provided during therapy sessions    Assessment     Anoop is a 73 y.o. male referred to outpatient Physical Therapy with a medical diagnosis of acute low back pain . Patient presents with low back and L hip pain, decreased core and lower extremity muscle weakness, tissue tightness, impaired movement mechanics, and decreased motor control. Patient's impairments are currently limiting his overall mobility and activity tolerance.  Patient requires min  verbal and visual cues to progress through completion of exercises with proper alignment, speed of movement, count, and hold times.  Patient reports marked tenderness  during manual therapy technique application.  LPTA added manual LE stretches to physical therapy treatment session.  Patient reports low back pain decreased to 2/10 with less symptoms of pain in Left hip during ambulation.     Elio Is progressing well towards his goals.   Patient prognosis is Good.     Patient will continue to benefit from skilled outpatient physical therapy to address the deficits listed in the problem list box on initial evaluation, provide pt/family education and to maximize pt's level of independence in the home and community environment.     Patient's spiritual, cultural and educational needs considered and pt agreeable to plan of care and goals.     Anticipated barriers to physical therapy: chronicity of low back pain episodes, compliance with Home exercise program, Patient's high activity levels     Goals:   Short Term Goals: 2 weeks   Patient will independently complete Home exercise program with correct form.   Patient will report decreased pain to less than or equal to 6/10 for improved quality of life.                    Long Term Goals: 3 weeks   Patient will report decreased pain to less than or equal to 3/10 for improved quality of life.   Patient will have increased B lower extremity muscle strength to greater than or equal to 4+/5 for improved gait mechanics.   Patient will have increased FOTO score to greater than or equal to 65% for improved function.   Patient will have increased B hamstring length to less than or equal to -5 degrees.   Patient will report decreased pain to less than or equal to 3/10 with sit to stand transfer for improved quality of life.     Plan     Plan of care Certification: 3/28/2024 to 4/19/2024.  Outpatient Physical Therapy 2 times weekly for 3 weeks to include the following interventions: Electrical Stimulation unattended, Manual Therapy, Moist Heat/ Ice, Patient Education, Therapeutic Activities, Therapeutic Exercise, and Ultrasound.     Continue Plan of  Care per PT order to progress patient toward rehab goals as tolerated by patient.   Karen Marshall, PTA  4/5/2024

## 2024-04-10 ENCOUNTER — CLINICAL SUPPORT (OUTPATIENT)
Dept: REHABILITATION | Facility: HOSPITAL | Age: 74
End: 2024-04-10
Payer: MEDICARE

## 2024-04-10 DIAGNOSIS — M54.42 ACUTE LEFT-SIDED LOW BACK PAIN WITH LEFT-SIDED SCIATICA: Primary | ICD-10-CM

## 2024-04-10 PROCEDURE — 97110 THERAPEUTIC EXERCISES: CPT | Mod: KX

## 2024-04-10 PROCEDURE — 97014 ELECTRIC STIMULATION THERAPY: CPT | Mod: KX

## 2024-04-10 PROCEDURE — 97530 THERAPEUTIC ACTIVITIES: CPT | Mod: KX

## 2024-04-10 PROCEDURE — 97140 MANUAL THERAPY 1/> REGIONS: CPT | Mod: KX

## 2024-04-10 NOTE — PROGRESS NOTES
"[facility]   Physical Therapy Treatment Note      Name: Anoop Harkins Sr.  Clinic Number: [id number]    Visit Date: 4/10/2024  Therapy Diagnosis:        Encounter Diagnoses   Name Primary?    S/P right rotator cuff repair      Acute pain of right shoulder Yes        Physician: Keven Vazquez Jr., *     Physician Orders: PT Eval and Treat   Medical Diagnosis from Referral: R rotator cuff repair and subacromial debridement   Evaluation Date: 1/19/2024  Authorization Period Expiration: 1/18/2025  Plan of Care Expiration: 4/10/2024  Progress Note Due: 4/10/2024  Date of Surgery: 1/18/2024  Visit # / Visits authorized: 32/36   FOTO: to be completed on visit 36     Precautions: Standard      Time In: 13:48  Time Out: 14:43  Total Billable Time: 55 minutes     PTA Visit #: 0/5  Subjective   Patient reports: "The weather has it aching some."       He was compliant with home exercise program.  Response to previous treatment: No adverse effects reported   Functional change:  increased active range of motion with less pain     Pain: 1/10   Location: right shoulder      Objective    Objective Measures updated at progress report unless specified.   Treatment   Elio received the treatments listed below:      therapeutic exercises to develop strength, endurance, ROM, and posture.  Therapeutic activities to improve functional performance .See flowsheet below   *indicates increases in reps or resistance during this treatment session       Ther-Ex Reps    UBE 3/3   Standing ball rolls flexion 10 x 5" each    Finger Ladder 5 x 10"    Cane flexion stretch 5 x 10"    Bicep Curls  2 x 10 4 #   IR with pulleys  3 minutes   Therapeutic-Activities  Reps    I,Y,T's 20 x 1# each    Prone Rows   2 x 10, 2#    Prone ABD  2 x 10 1#*   Prone Extension  2 x 10, 2#    TB IR and ER  3 x 10 green TB    Standing PNF patterns  10 x each red TB    Rows with Tband  3 x 10 green  TB *   B extension with Tband  3 x 10 green TB " *   Spider génesis  3 x up and down red TB    Wall Clock  3 x red TB    ER walk outs with TB  2 x 10 red    IR walk outs with TB  2 x 10 red        Patient Education and Home Exercises       Education provided:   - Plan of Care, Delayed onset muscle soreness, Home exercise program     Written Home Exercises Provided: Patient instructed to cont prior HEP. Exercises were reviewed and Elio was able to demonstrate them prior to the end of the session.  Elio demonstrated fair  understanding of the education provided. See Electronic Medical Record under Patient Instructions for exercises provided during therapy sessions    Assessment     Anoop is a 73 y.o. male referred to outpatient Physical Therapy with a medical diagnosis of R rotator cuff repair and subacromial debridement. Patient presents with R shoulder pain, decreased R shoulder ROM, decreased R UE muscle strength and motor control. Patient's impairments are currently limiting his use of R UE for ADLs and patient is R hand dominant. Patient demonstrates increasing R UE strength and stability with exercises. He also demonstrates increasing endurance resulting in less rest breaks due  to fatigue. Patient requires occasional visual cues for improved form and motor control to increase effectiveness of exercises. Patient had no reports of adverse effects to treatment tasks.       Patient's prognosis is good.   Patient will continue to benefit from skilled outpatient physical therapy to address the deficits listed in the problem list box on initial evaluation, provide pt/family education and to maximize pt's level of independence in the home and community environment.     Patient's spiritual, cultural and educational needs considered and pt agreeable to plan of care and goals.     Anticipated barriers to physical therapy:  compliance with Home exercise program and post surgical restrictions     Goals:   Short Term Goals: 3 weeks   Patient will independently complete Home  exercise program with correct form.   Patient will report R shoulder pain less than or equal to 3/10 for improved quality of life.      Long Term Goals: 6 weeks   Pt will increase R shoulder flexion to 120 degrees, external rotation to C4, and internal rotation to L2 for independent dressing  Pt will increase R upper extremity to 4/5 to allow patient to perform activities of daily living independently  Pt will report decrease in pain to 1/10 to improve quality of life.  Pt will place 5 pound object in overhead shelf without hike and with less than or equal to 2/10 pain to allow patient to return to prior level of function   Patient will have increased FOTO score to greater than or equal to 65% for improved function.     Plan   Plan of care Certification: 1/19/2024 to 3/1/2024.     Outpatient Physical Therapy 3 times weekly for 6 weeks to include the following interventions: Electrical Stimulation unattended, Manual Therapy, Moist Heat/ Ice, Neuromuscular Re-ed, Patient Education, Therapeutic Activities, Therapeutic Exercise, Ultrasound, and Vasopneumatic device.     Continue Plan of Care per PT order to progress patient toward rehab goals as tolerated by patient.   Mickey Bustillos, PT, DPT      4/10/2024

## 2024-04-10 NOTE — PROGRESS NOTES
"[facility]   Physical Therapy Treatment Note      Name: Anoop Harkins Sr.  Clinic Number: [id number]    Therapy Diagnosis: low back pain with L side sciatica        Physician: Keven Vazquez Jr., *     Physician Orders: PT Eval and Treat   Medical Diagnosis from Referral: acute low back pain  Evaluation Date: 3/28/2024  Authorization Period Expiration: 1/18/2025  Plan of Care Expiration: 4/19/2024  Progress Note Due: 4/19/2024   Date of Surgery: NA   Visit # / Visits authorized: 4/6   FOTO: to be completed on visit 4      Precautions: Standard      Time In: 14:46  Time Out:15:40  Total Billable Time: 54 minutes   Visit Date: 4/10/2024    PTA Visit #: 0/5   Subjective     Patient reports: "Today is the first day that I haven't noticed my back hurting." Patient presents to PT clinic with improved gait mechanics.   .  He was compliant with home exercise program.  Response to previous treatment: Decreased symptoms of low back and hip pain initially   Functional change: improved gait mechanics and increased activity tolerance with less pain    Pain: 1/10  Location: bilateral back      Objective      Objective Measures updated at progress report unless specified.     Treatment     Elio received the treatments listed below:      therapeutic exercises for 30 minutes to develop ROM, flexibility, posture, and core stabilization.  See flowsheet below      Ther-Ex Reps    Nustep  8 minutes (not completed)    Wedge  2 minutes    Hamstring Stretch with sciatic nerve glide   20 x 3" Each    Posterior Pelvic Tilts  20 x 3"    Lower Trunk Rotations 5 x 10 "    Single Knee to Chest Stretch 5 x 10" each    Piriformis Stretch 2 x 20" each    Figure 4 Stretch 2 x 20" Each    Ricco Stretch  2 x 20" each    Hip Abduction      Manual Hip flexor stretch  2 x 20" each LE    Manual Hamstring stretch  2 x 20" each LE                    manual therapy techniques for 14 minutes: Joint mobilizations, Soft tissue " Mobilization, and active neuromuscular releases were applied to the: R lumbar and thoracic paraspinals, R psoas, thoracic spine and lumbar spine to decrease tissue tightness, improve joint mobility, and improve patient's movement mechanics.       supervised modalities after being cleared for contradictions: BIPHASIC ESTIM:  Anoop received  electrical stimulation for pain to the 10. Pt received burst mode at a rate of 2 pps for 10 minutes. Anoop tolerated treatment well without any adverse effects.      hot pack for 10 minutes to B lumbosacral muscles with ESTIM .     Patient Education and Home Exercises      Education provided:   - eval findings, plan of care, Home exercise program      Written Home Exercises Provided: yes. Exercises were reviewed and Elio was able to demonstrate them prior to the end of the session.  Elio demonstrated good  understanding of the education provided. See EMR under Patient Instructions for exercises provided during therapy sessions.  Patient Education and Home Exercises       Education provided:   - Plan of Care, Home exercise program, Delayed onset muscle soreness     Written Home Exercises Provided: Patient instructed to cont prior HEP. Exercises were reviewed and Elio was able to demonstrate them prior to the end of the session.  Elio demonstrated good  understanding of the education provided. See Electronic Medical Record under Patient Instructions for exercises provided during therapy sessions    Assessment     Anoop is a 73 y.o. male referred to outpatient Physical Therapy with a medical diagnosis of acute low back pain . Patient presents with low back and L hip pain, decreased core and lower extremity muscle weakness, tissue tightness, impaired movement mechanics, and decreased motor control. Patient's impairments are currently limiting his overall mobility and activity tolerance.  PT progressed patient through lumbar and hip stretches to continue increasing mobility and  promoting improved joint mobility and movement mechanics. PT provided patient education on seated hamstring stretches and standing hip flexor stretch and educated patient on importance of stretching following periods of increased sitting or driving. PT completed palpation assessment and noted that patient presented with increased R side lumbar and thoracic tissue tightness today and decreased thoracic spine mobility that could be contributing to abnormal lumbar movement mechanics and pain. Patient's tissue extensibility improved with manual interventions and patient had no reports of adverse effects to treatment.     Elio Is progressing well towards his goals.   Patient prognosis is Good.     Patient will continue to benefit from skilled outpatient physical therapy to address the deficits listed in the problem list box on initial evaluation, provide pt/family education and to maximize pt's level of independence in the home and community environment.     Patient's spiritual, cultural and educational needs considered and pt agreeable to plan of care and goals.     Anticipated barriers to physical therapy: chronicity of low back pain episodes, compliance with Home exercise program, Patient's high activity levels     Goals:   Short Term Goals: 2 weeks   Patient will independently complete Home exercise program with correct form.   Patient will report decreased pain to less than or equal to 6/10 for improved quality of life.                    Long Term Goals: 3 weeks   Patient will report decreased pain to less than or equal to 3/10 for improved quality of life.   Patient will have increased B lower extremity muscle strength to greater than or equal to 4+/5 for improved gait mechanics.   Patient will have increased FOTO score to greater than or equal to 65% for improved function.   Patient will have increased B hamstring length to less than or equal to -5 degrees.   Patient will report decreased pain to less than or  equal to 3/10 with sit to stand transfer for improved quality of life.     Plan     Plan of care Certification: 3/28/2024 to 4/19/2024.  Outpatient Physical Therapy 2 times weekly for 3 weeks to include the following interventions: Electrical Stimulation unattended, Manual Therapy, Moist Heat/ Ice, Patient Education, Therapeutic Activities, Therapeutic Exercise, and Ultrasound.     Continue Plan of Care per PT order to progress patient toward rehab goals as tolerated by patient.   Mickey Bustillos, PT, DPT    4/10/2024

## 2024-04-12 ENCOUNTER — CLINICAL SUPPORT (OUTPATIENT)
Dept: REHABILITATION | Facility: HOSPITAL | Age: 74
End: 2024-04-12
Payer: MEDICARE

## 2024-04-12 DIAGNOSIS — M25.511 ACUTE PAIN OF RIGHT SHOULDER: Primary | ICD-10-CM

## 2024-04-12 DIAGNOSIS — M54.42 ACUTE LEFT-SIDED LOW BACK PAIN WITH LEFT-SIDED SCIATICA: ICD-10-CM

## 2024-04-12 PROCEDURE — 97110 THERAPEUTIC EXERCISES: CPT | Mod: KX

## 2024-04-12 PROCEDURE — 97530 THERAPEUTIC ACTIVITIES: CPT | Mod: KX

## 2024-04-12 PROCEDURE — 97014 ELECTRIC STIMULATION THERAPY: CPT

## 2024-04-12 NOTE — PROGRESS NOTES
"[facility]   Physical Therapy Treatment Note      Name: Anoop Harkins Sr.  Clinic Number: [id number]    Therapy Diagnosis: low back pain with L side sciatica        Physician: Keven Vazquez Jr., *     Physician Orders: PT Eval and Treat   Medical Diagnosis from Referral: acute low back pain  Evaluation Date: 3/28/2024  Authorization Period Expiration: 1/18/2025  Plan of Care Expiration: 4/19/2024  Progress Note Due: 4/19/2024   Date of Surgery: NA   Visit # / Visits authorized: 5/6   FOTO: to be completed on visit 4      Precautions: Standard      Time In: 8:41  Time Out:9:20  Total Billable Time: 39 minutes   Visit Date: 4/12/2024    PTA Visit #: 0/5   Subjective     Patient reports: "I was so sore the yesterday from all of that work you did."  .  He was compliant with home exercise program.  Response to previous treatment: Decreased symptoms of low back and hip pain initially   Functional change: improved gait mechanics and increased activity tolerance with less pain    Pain: 3/10 Soreness  Location: bilateral back      Objective      Objective Measures updated at progress report unless specified.     Treatment     Elio received the treatments listed below:      therapeutic exercises for 28 minutes to develop ROM, flexibility, posture, and core stabilization.  See flowsheet below      Ther-Ex Reps    Bike 5 minutes (not completed)    Wedge  2 minutes    Hamstring Stretch with sciatic nerve glide   20 x 3" Each    Posterior Pelvic Tilts  20 x 3"    Lower Trunk Rotations 5 x 10 "    Single Knee to Chest Stretch 5 x 10" each    Piriformis Stretch 2 x 20" each    Figure 4 Stretch 2 x 20" Each    Ricco Stretch  2 x 20" each    Hip Abduction      Manual Hip flexor stretch     Manual Hamstring stretch                    supervised modalities after being cleared for contradictions: BIPHASIC ESTIM:  Anoop received  electrical stimulation for pain & tissue tightness to the B thoracic " paraspinals and B piriformis muscles. Pt received burst mode at a rate of 2 pps for 12 minutes. Anoop tolerated treatment well without any adverse effects.      hot pack for 12 minutes to B lumbosacral muscles with ESTIM .     Patient Education and Home Exercises      Education provided:   - eval findings, plan of care, Home exercise program      Written Home Exercises Provided: yes. Exercises were reviewed and Elio was able to demonstrate them prior to the end of the session.  Elio demonstrated good  understanding of the education provided. See EMR under Patient Instructions for exercises provided during therapy sessions.  Patient Education and Home Exercises       Education provided:   - Plan of Care, Home exercise program, Delayed onset muscle soreness     Written Home Exercises Provided: Patient instructed to cont prior HEP. Exercises were reviewed and Elio was able to demonstrate them prior to the end of the session.  Elio demonstrated good  understanding of the education provided. See Electronic Medical Record under Patient Instructions for exercises provided during therapy sessions    Assessment     Anoop is a 73 y.o. male referred to outpatient Physical Therapy with a medical diagnosis of acute low back pain . Patient presents with low back and L hip pain, decreased core and lower extremity muscle weakness, tissue tightness, impaired movement mechanics, and decreased motor control. Patient's impairments are currently limiting his overall mobility and activity tolerance.  PT withheld manual interventions today to allow patient increased time to recover from muscle soreness following last treatment. Patient required moderate cues to complete lumbar and hip stretches with correct form and hold times. Patient had no reports of increased pain at end of session. PT completed patient goal assessment for last scheduled visit. Patient can benefit from continued skilled PT services to progress toward increasing B  hip and core strength in order to improve lumbar stability and decrease pain.    Elio Is progressing well towards his goals.   Patient prognosis is Good.     Patient will continue to benefit from skilled outpatient physical therapy to address the deficits listed in the problem list box on initial evaluation, provide pt/family education and to maximize pt's level of independence in the home and community environment.     Patient's spiritual, cultural and educational needs considered and pt agreeable to plan of care and goals.     Anticipated barriers to physical therapy: chronicity of low back pain episodes, compliance with Home exercise program, Patient's high activity levels     Goals:   Short Term Goals: 2 weeks   Patient will independently complete Home exercise program with correct form.   Patient will report decreased pain to less than or equal to 6/10 for improved quality of life.                    Long Term Goals: 3 weeks   Patient will report decreased pain to less than or equal to 3/10 for improved quality of life.   Patient will have increased B lower extremity muscle strength to greater than or equal to 4+/5 for improved gait mechanics.   Patient will have increased FOTO score to greater than or equal to 65% for improved function.   Patient will have increased B hamstring length to less than or equal to -5 degrees.   Patient will report decreased pain to less than or equal to 3/10 with sit to stand transfer for improved quality of life.     Plan     Plan of care Certification: 3/28/2024 to 5/10/2024.  Outpatient Physical Therapy 2 times weekly for 4 weeks to include the following interventions: Electrical Stimulation unattended, Manual Therapy, Moist Heat/ Ice, Patient Education, Therapeutic Activities, Therapeutic Exercise, and Ultrasound.   Recert 2 x 4 weeks   Continue Plan of Care per PT order to progress patient toward rehab goals as tolerated by patient.   Mickey Bustillos, PT, DPT     4/12/2024

## 2024-04-12 NOTE — PROGRESS NOTES
"[facility]   Physical Therapy Treatment Note      Name: Anoop Harkins Sr.  Clinic Number: [id number]    Visit Date: 4/12/2024  Therapy Diagnosis:        Encounter Diagnoses   Name Primary?    S/P right rotator cuff repair      Acute pain of right shoulder Yes        Physician: Keven Vazquez Jr., *     Physician Orders: PT Eval and Treat   Medical Diagnosis from Referral: R rotator cuff repair and subacromial debridement   Evaluation Date: 1/19/2024  Authorization Period Expiration: 1/18/2025  Plan of Care Expiration: 5/10/2024  Progress Note Due: 5/10/2024  Date of Surgery: 1/18/2024  Visit # / Visits authorized: 33/44  FOTO: to be completed on visit 40     Precautions: Standard      Time In: 7:58 (later check in time)  Time Out: 8:40  Total Billable Time: 42 minutes     PTA Visit #: 0/5  Subjective   Patient reports: "It's sore, but I think that is from use." Patient also reports a "catching" pain in anterior R shoulder when lowering his arm from abduction.     He was compliant with home exercise program.  Response to previous treatment: No adverse effects reported   Functional change:  increased active range of motion with less pain     Pain: 4/10   Location: right shoulder    Objective    Objective Measures updated at progress report unless specified.   Treatment   Elio received the treatments listed below:      therapeutic exercises to develop strength, endurance, ROM, and posture.  Therapeutic activities to improve functional performance .See flowsheet below   *indicates increases in reps or resistance during this treatment session     Ther-Ex Reps    UBE 3/3 L2   Standing ball rolls flexion 10 x 5" each    Finger Ladder 5 x 10"    Cane flexion stretch 5 x 10"    Bicep Curls  2 x 10 4 #   IR with pulleys  3 minutes   Therapeutic-Activities  Reps    I,Y,T's 20 x 1# each    Prone Rows   2 x 10, 2#    Prone ABD  2 x 10 1#*   Prone Extension  2 x 10, 2#    TB IR and ER  3 x 10 green TB "    Standing PNF patterns  10 x each red TB    Rows with Tband  3 x 10 green  TB    B extension with Tband  3 x 10 green TB    Spider génesis  3 x up and down red TB    Wall Clock  3 x red TB    ER walk outs with TB  2 x 10 red    IR walk outs with TB  2 x 10 red        Patient Education and Home Exercises       Education provided:   - Plan of Care, Delayed onset muscle soreness, Home exercise program     Written Home Exercises Provided: Patient instructed to cont prior HEP. Exercises were reviewed and Elio was able to demonstrate them prior to the end of the session.  Elio demonstrated fair  understanding of the education provided. See Electronic Medical Record under Patient Instructions for exercises provided during therapy sessions    Assessment     Anoop is a 73 y.o. male referred to outpatient Physical Therapy with a medical diagnosis of R rotator cuff repair and subacromial debridement. Patient presents with R shoulder pain, decreased R shoulder ROM, decreased R UE muscle strength and motor control. Patient's impairments are currently limiting his use of R UE for ADLs and patient is R hand dominant. Patient demonstrates increasing R UE strength and stability with exercises. PT provided verbal and tactile cueing for proper scapular stabilization and improved posture with exercises to decrease anterior shoulder pain with eccentric lowering. Patient reported increased soreness following exercises, but no other adverse effects to treatment. PT complete patient goal assessment for last scheduled visit. Patient can benefit from continued skilled PT services to continue increasing R shoulder strength and stability to decrease risk of reinjury and promote return to PLOF.       Patient's prognosis is good.   Patient will continue to benefit from skilled outpatient physical therapy to address the deficits listed in the problem list box on initial evaluation, provide pt/family education and to maximize pt's level of  independence in the home and community environment.     Patient's spiritual, cultural and educational needs considered and pt agreeable to plan of care and goals.     Anticipated barriers to physical therapy:  compliance with Home exercise program and post surgical restrictions     Goals:   Short Term Goals: 3 weeks   Patient will independently complete Home exercise program with correct form.   Patient will report R shoulder pain less than or equal to 3/10 for improved quality of life.      Long Term Goals: 6 weeks   Pt will increase R shoulder flexion to 120 degrees, external rotation to C4, and internal rotation to L2 for independent dressing  Pt will increase R upper extremity to 4/5 to allow patient to perform activities of daily living independently  Pt will report decrease in pain to 1/10 to improve quality of life.  Pt will place 5 pound object in overhead shelf without hike and with less than or equal to 2/10 pain to allow patient to return to prior level of function   Patient will have increased FOTO score to greater than or equal to 65% for improved function.     Plan   Plan of care Certification: 1/19/2024 to 3/1/2024.     Outpatient Physical Therapy 3 times weekly for 6 weeks to include the following interventions: Electrical Stimulation unattended, Manual Therapy, Moist Heat/ Ice, Neuromuscular Re-ed, Patient Education, Therapeutic Activities, Therapeutic Exercise, Ultrasound, and Vasopneumatic device.     Continue Plan of Care per PT order to progress patient toward rehab goals as tolerated by patient.   Mickey Bustillos, PT, DPT      4/12/2024

## 2024-04-12 NOTE — PLAN OF CARE
[facility]  Physical Therapy Plan of Care Note     Name: Anoop Harkins Sr.  Clinic Number: [id number]    Therapy Diagnosis:   Encounter Diagnoses   Name Primary?    Acute left-sided low back pain with left-sided sciatica      Physician: Laila Cruz FNP    Visit Date: 4/12/2024    Physician Orders: Eval and Treat   Medical Diagnosis from Referral: R rotator cuff repair and subacromial debridement   Evaluation Date: 1/19/2024  Authorization Period Expiration: 1/18/2025  Plan of Care Expiration: 5/10/2024  Progress Note Due: 5/10/2024  Date of Surgery: 1/18/2024  Visit # / Visits authorized: 33/44  FOTO: to be completed on visit 40    Precautions: Standard  Functional Level Prior to Evaluation:  Independent     Subjective     Update: Patient reports decreased low back pain at rest and with walking.    Objective      Update: Patient demonstrates improved flexibility and pelvic alignment.     Assessment     Update: Patient can benefit from continued skilled PT services to increase core and hip muscle strength to promote increased lumbar stability     Previous Short Term Goals Status:     Patient will independently complete Home exercise program with correct form.-Goal in progress  Patient will report decreased pain to less than or equal to 6/10 for improved quality of life.  -MET         Long Term Goal Status: continue per initial plan of care.  Reasons for Recertification of Therapy:   Continue progressing toward rehab goals and promote return to PLOF.     GOALS  Patient will report decreased pain to less than or equal to 3/10 for improved quality of life. -Goal MET   Patient will have increased B lower extremity muscle strength to greater than or equal to 4+/5 for improved gait mechanics. -Goal in progress  Patient will have increased FOTO score to greater than or equal to 65% for improved function. -Goal in progress 63%  Patient will have increased B hamstring length to less than or equal  to -5 degrees. -Goal in progress  Patient will report decreased pain to less than or equal to 3/10 with sit to stand transfer for improved quality of life. -Goal in progress    Plan     Updated Certification Period: 4/12/2024 to 5/10/2024   Recommended Treatment Plan: 2 times per week for 4 weeks:  Electrical Stimulation unattended, Manual Therapy, Moist Heat/ Ice, Neuromuscular Re-ed, Patient Education, Therapeutic Activities, Therapeutic Exercise, and Ultrasound      Mickey Bustillos, PT, DPT     Physician Signature : ____________________________________________________  Date:_______________________________________________

## 2024-04-12 NOTE — PLAN OF CARE
[facility]  Physical Therapy Plan of Care Note     Name: Anoop Harkins Sr.  Clinic Number: [id number]    Therapy Diagnosis:   Encounter Diagnoses   Name Primary?    Acute pain of right shoulder Yes     Physician: Keven Vazquez Jr., *    Visit Date: 4/12/2024    Physician Orders: Eval and Treat   Medical Diagnosis from Referral: R rotator cuff repair and subacromial debridement   Evaluation Date: 1/19/2024  Authorization Period Expiration: 1/18/2025  Plan of Care Expiration: 5/10/2024  Progress Note Due: 5/10/2024  Date of Surgery: 1/18/2024  Visit # / Visits authorized: 33/44  FOTO: to be completed on visit 40    Precautions: Standard  Functional Level Prior to Evaluation:  Independent     Subjective     Update: Patient reports increased R shoulder soreness due to increased functional use of R UE for ADLs.     Objective      Update: Patient has full R shoulder active range of motion in all directions except IR with functional reach to L2     Assessment     Update: Patient can benefit from continued skilled rehab services to continue increasing R shoulder strength and stability to decrease risk of reinjury with return to PLOF    Previous Short Term Goals Status:     Patient will independently complete Home exercise program with correct form. -MET  Patient will report R shoulder pain less than or equal to 3/10 for improved quality of life. -MET     Long Term Goal Status: continue per initial plan of care.  Reasons for Recertification of Therapy:   Continue progressing toward rehab goals.     GOALS  Pt will increase R shoulder flexion to 120 degrees, external rotation to C4, and internal rotation to L2 for independent dressing-MET   Pt will increase R upper extremity to 4/5 to allow patient to perform activities of daily living independently-Goal in progress 3+/5 within available ROM  Pt will report decrease in pain to 1/10 to improve quality of life.-Goal in progress   Pt will place 5  pound object in overhead shelf without hike and with less than or equal to 2/10 pain to allow patient to return to prior level of function -Goal in progress  Patient will have increased FOTO score to greater than or equal to 65% for improved function. MET -69%   Plan   Updated Certification Period: 4/12/2024 to 5/10/2023   Recommended Treatment Plan: 2 times per week for 4 weeks:  Electrical Stimulation unattended, Gait Training, Manual Therapy, Moist Heat/ Ice, Neuromuscular Re-ed, Patient Education, Therapeutic Activities, Therapeutic Exercise, and Ultrasound  Geraldtia JONI Bustillos, PT, DPT     Physician Signature : ____________________________________________________  Date:_______________________________________________

## 2024-04-19 ENCOUNTER — CLINICAL SUPPORT (OUTPATIENT)
Dept: REHABILITATION | Facility: HOSPITAL | Age: 74
End: 2024-04-19
Payer: MEDICARE

## 2024-04-19 DIAGNOSIS — M54.42 ACUTE LEFT-SIDED LOW BACK PAIN WITH LEFT-SIDED SCIATICA: ICD-10-CM

## 2024-04-19 DIAGNOSIS — M25.511 ACUTE PAIN OF RIGHT SHOULDER: Primary | ICD-10-CM

## 2024-04-19 PROCEDURE — 97140 MANUAL THERAPY 1/> REGIONS: CPT | Mod: KX

## 2024-04-19 PROCEDURE — 97530 THERAPEUTIC ACTIVITIES: CPT | Mod: KX

## 2024-04-19 PROCEDURE — 97014 ELECTRIC STIMULATION THERAPY: CPT | Mod: KX

## 2024-04-19 NOTE — PROGRESS NOTES
"[facility]   Physical Therapy Treatment Note      Name: Anoop Harkins Sr.  Clinic Number: [id number]    Visit Date: 4/19/2024  Therapy Diagnosis:        Encounter Diagnoses   Name Primary?    S/P right rotator cuff repair      Acute pain of right shoulder Yes        Physician: Keven Vazquez Jr., *     Physician Orders: PT Eval and Treat   Medical Diagnosis from Referral: R rotator cuff repair and subacromial debridement   Evaluation Date: 1/19/2024  Authorization Period Expiration: 1/18/2025  Plan of Care Expiration: 5/10/2024  Progress Note Due: 5/10/2024  Date of Surgery: 1/18/2024  Visit # / Visits authorized: 33/44  FOTO: to be completed on visit 40     Precautions: Standard      Time In: 13:20  Time Out: 14:00  Total Billable Time: 40 minutes     PTA Visit #: 0/5  Subjective   Patient reports: "Its been hurting right in the front of my shoulder some but it is finally getting better. I was having to take medicine to sleep."     He was compliant with home exercise program.  Response to previous treatment: No adverse effects reported   Functional change:  increased active range of motion with less pain     Pain: 4/10   Location: right shoulder    Objective    Objective Measures updated at progress report unless specified.   Treatment   Elio received the treatments listed below:      therapeutic exercises for 10 minutes to develop strength, endurance, ROM, and posture.  Therapeutic activities for 27 minutes to improve functional performance .See flowsheet below   *indicates increases in reps or resistance during this treatment session     Ther-Ex Reps    UBE 4/4 L2   Standing ball rolls flexion 10 x 5" each    Finger Ladder 5 x 10" (not today)   Cane flexion stretch 5 x 10" (not today)   Bicep Curls  2 x 10 4 #(not today)   IR with pulleys  3 minutes (not today)   Therapeutic-Activities  Reps    I,Y,T's 20 x 1# each    Prone Rows   2 x 10, 2#    Prone ABD  2 x 10 1#*   Prone Extension  " 2 x 10, 2#    TB IR and ER  3 x 10 green TB    Standing PNF patterns  10 x each red TB    Rows with Tband  3 x 10 green  TB    B extension with Tband  3 x 10 green TB    Spider génesis  3 x up and down red TB (not today)   Wall Clock  3 x red TB (not today)   ER walk outs with TB  2 x 10 red (unable)   IR walk outs with TB  2 x 10 red (unable)     Manual therapy for 8 minutes: PT completed soft tissue mobilizations and trigger point release to R rhomboid and middle trapezius as well as scapular mobilizations to improve scapular upward rotation with ROM tasks.     Modalities: PT applied biphasic ESTIM to R rhomboid, upper trapezius, bicep, and pectoralis to decrease tissue irritation and promote increased blood flow to decrease inflammation of R shoulder structures. Patient received burst modulation at 2 pps for 10 minutes with ICE. (Completed with low back modalities at end of session)    Patient Education and Home Exercises       Education provided:   - Plan of Care, Delayed onset muscle soreness, Home exercise program     Written Home Exercises Provided: Patient instructed to cont prior HEP. Exercises were reviewed and Elio was able to demonstrate them prior to the end of the session.  Elio demonstrated fair  understanding of the education provided. See Electronic Medical Record under Patient Instructions for exercises provided during therapy sessions    Assessment     Anoop is a 73 y.o. male referred to outpatient Physical Therapy with a medical diagnosis of R rotator cuff repair and subacromial debridement. Patient presents with R shoulder pain, decreased R shoulder ROM, decreased R UE muscle strength and motor control. Patient's impairments are currently limiting his use of R UE for ADLs and patient is R hand dominant. Patient demonstrates increasing R UE strength and stability with exercises. Patient reported anterior R shoulder pain upon arrival to PT that has recently been limiting his activity tolerance and  sleep. Patient reported pain at attachment of rotator cuff tendons with attempted D2 PNF exercise with resistance band. PT with held exercises as shown on flowsheet above to avoid further irritation of RTC tendons at this time. PT completed palpation assessment of patients R shoulder and noted trigger points in R rhomobid and middle trapezius and decreased scapular mobility with mobilizations that improved after manual interventions. Patients symptoms consistent with RTC tendonitis at this time. No adverse effects noted to PT treatment today.     Patient's prognosis is good.   Patient will continue to benefit from skilled outpatient physical therapy to address the deficits listed in the problem list box on initial evaluation, provide pt/family education and to maximize pt's level of independence in the home and community environment.     Patient's spiritual, cultural and educational needs considered and pt agreeable to plan of care and goals.     Anticipated barriers to physical therapy:  compliance with Home exercise program and post surgical restrictions     Goals:   Short Term Goals: 3 weeks   Patient will independently complete Home exercise program with correct form.   Patient will report R shoulder pain less than or equal to 3/10 for improved quality of life.      Long Term Goals: 6 weeks   Pt will increase R shoulder flexion to 120 degrees, external rotation to C4, and internal rotation to L2 for independent dressing  Pt will increase R upper extremity to 4/5 to allow patient to perform activities of daily living independently  Pt will report decrease in pain to 1/10 to improve quality of life.  Pt will place 5 pound object in overhead shelf without hike and with less than or equal to 2/10 pain to allow patient to return to prior level of function   Patient will have increased FOTO score to greater than or equal to 65% for improved function.     Plan   Plan of care Certification: 1/19/2024 to 3/1/2024.      Outpatient Physical Therapy 3 times weekly for 6 weeks to include the following interventions: Electrical Stimulation unattended, Manual Therapy, Moist Heat/ Ice, Neuromuscular Re-ed, Patient Education, Therapeutic Activities, Therapeutic Exercise, Ultrasound, and Vasopneumatic device.     Continue Plan of Care per PT order to progress patient toward rehab goals as tolerated by patient.   Mickey Bustillos, PT, DPT      4/19/2024

## 2024-04-19 NOTE — PROGRESS NOTES
"[facility]   Physical Therapy Treatment Note      Name: Anoop Harkins Sr.  Clinic Number: [id number]    Therapy Diagnosis: low back pain with L side sciatica        Physician: Keven Vazquez Jr., *     Physician Orders: PT Eval and Treat   Medical Diagnosis from Referral: acute low back pain  Evaluation Date: 3/28/2024  Authorization Period Expiration: 2025  Plan of Care Expiration: 2024  Progress Note Due: 2024   Date of Surgery: NA   Visit # / Visits authorized:    FOTO: to be completed on visit 4      Precautions: Standard      Time In: 14:00  Time Out:14:38  Total Billable Time: 38 minutes   Visit Date: 2024    PTA Visit #: 0/5   Subjective     Patient reports: "I drove my wife's car to a . It was 150 miles each way. On the way home the pain was so bad that I had to get in the backseat and lay down."   .  He was compliant with home exercise program.  Response to previous treatment: Decreased symptoms of low back and hip pain initially   Functional change: improved gait mechanics and increased activity tolerance with less pain    Pain: 5/10 Soreness  Location: bilateral back      Objective      Objective Measures updated at progress report unless specified.     Treatment     Elio received the treatments listed below:      therapeutic exercises to develop ROM, flexibility, posture, and core stabilization.  See flowsheet below    WITH HELD Therapeutic-Exercise TODAY   Ther-Ex Reps    Bike 5 minutes    Wedge  2 minutes    Hamstring Stretch with sciatic nerve glide   20 x 3" Each    Posterior Pelvic Tilts  20 x 3"    Lower Trunk Rotations 5 x 10 "    Single Knee to Chest Stretch 5 x 10" each    Piriformis Stretch 2 x 20" each    Figure 4 Stretch 2 x 20" Each    Ricco Stretch  2 x 20" each    Hip Abduction      Manual Hip flexor stretch     Manual Hamstring stretch                 Manual therapy: PT completed joint mobilizations, soft tissue mobilization, and " active neuromuscular releases to B quadratus lumborum, B piriformis, L SI joint, L psoas, L innominate to promote decreased tissue tightness, improved joint mobility, and improved pelvic alignment.     supervised modalities after being cleared for contradictions: BIPHASIC ESTIM:  Anoop received  electrical stimulation for pain & tissue tightness to the B thoracic paraspinals and B piriformis muscles. Pt received burst mode at a rate of 2 pps for 12 minutes. Anoop tolerated treatment well without any adverse effects.      hot pack for 12 minutes to B lumbosacral muscles with ESTIM .     Patient Education and Home Exercises      Education provided:   - eval findings, plan of care, Home exercise program      Written Home Exercises Provided: yes. Exercises were reviewed and Elio was able to demonstrate them prior to the end of the session.  Elio demonstrated good  understanding of the education provided. See EMR under Patient Instructions for exercises provided during therapy sessions.  Patient Education and Home Exercises       Education provided:   - Plan of Care, Home exercise program, Delayed onset muscle soreness     Written Home Exercises Provided: Patient instructed to cont prior HEP. Exercises were reviewed and Elio was able to demonstrate them prior to the end of the session.  Elio demonstrated good  understanding of the education provided. See Electronic Medical Record under Patient Instructions for exercises provided during therapy sessions    Assessment     Anoop is a 73 y.o. male referred to outpatient Physical Therapy with a medical diagnosis of acute low back pain . Patient presents with low back and L hip pain, decreased core and lower extremity muscle weakness, tissue tightness, impaired movement mechanics, and decreased motor control. Patient's impairments are currently limiting his overall mobility and activity tolerance.  PT presented to PT today with reports of increased back pain and L sciatic  nerve symptoms since traveling a long distance earlier this week. Patient also reported pain in the bottom of his L heel when walking stating he felt like there was no cushion. PT noted that patient  has a L pelvic anterior rotation and upslip and significant tissue tightness in B lumbar and hip muscles. Following manual interventions and modalities patient reported no pain. Therapeutic-Exercise was with held at this time to focus on decreasing patient's pain. PT and patient discussed modifications and adjustments to the drivers seat of his wife's car to decreased his pain when driving.     Elio Is progressing well towards his goals.   Patient prognosis is Good.     Patient will continue to benefit from skilled outpatient physical therapy to address the deficits listed in the problem list box on initial evaluation, provide pt/family education and to maximize pt's level of independence in the home and community environment.     Patient's spiritual, cultural and educational needs considered and pt agreeable to plan of care and goals.     Anticipated barriers to physical therapy: chronicity of low back pain episodes, compliance with Home exercise program, Patient's high activity levels     Goals:   Short Term Goals: 2 weeks   Patient will independently complete Home exercise program with correct form.   Patient will report decreased pain to less than or equal to 6/10 for improved quality of life.                    Long Term Goals: 3 weeks   Patient will report decreased pain to less than or equal to 3/10 for improved quality of life.   Patient will have increased B lower extremity muscle strength to greater than or equal to 4+/5 for improved gait mechanics.   Patient will have increased FOTO score to greater than or equal to 65% for improved function.   Patient will have increased B hamstring length to less than or equal to -5 degrees.   Patient will report decreased pain to less than or equal to 3/10 with sit to  stand transfer for improved quality of life.     Plan     Plan of care Certification: 3/28/2024 to 5/10/2024.  Outpatient Physical Therapy 2 times weekly for 4 weeks to include the following interventions: Electrical Stimulation unattended, Manual Therapy, Moist Heat/ Ice, Patient Education, Therapeutic Activities, Therapeutic Exercise, and Ultrasound.   Recert 2 x 4 weeks   Continue Plan of Care per PT order to progress patient toward rehab goals as tolerated by patient.   Mickey Bustillos, PT, DPT    4/19/2024

## 2024-04-23 ENCOUNTER — CLINICAL SUPPORT (OUTPATIENT)
Dept: REHABILITATION | Facility: HOSPITAL | Age: 74
End: 2024-04-23
Payer: MEDICARE

## 2024-04-23 DIAGNOSIS — R52 PAIN: ICD-10-CM

## 2024-04-23 DIAGNOSIS — M54.42 ACUTE LEFT-SIDED LOW BACK PAIN WITH LEFT-SIDED SCIATICA: Primary | ICD-10-CM

## 2024-04-23 DIAGNOSIS — M25.511 ACUTE PAIN OF RIGHT SHOULDER: ICD-10-CM

## 2024-04-23 PROCEDURE — 97014 ELECTRIC STIMULATION THERAPY: CPT | Mod: CQ

## 2024-04-23 PROCEDURE — 97110 THERAPEUTIC EXERCISES: CPT | Mod: CQ

## 2024-04-23 PROCEDURE — 97140 MANUAL THERAPY 1/> REGIONS: CPT | Mod: CQ

## 2024-04-23 NOTE — PROGRESS NOTES
"[facility]   Physical Therapy Treatment Note      Name: Anoop Harkins Sr.  Clinic Number: [id number]    Therapy Diagnosis: low back pain with L side sciatica        Physician: Keven Vazquez Jr., *     Physician Orders: PT Eval and Treat   Medical Diagnosis from Referral: acute low back pain  Evaluation Date: 3/28/2024  Authorization Period Expiration: 1/18/2025  Plan of Care Expiration: 4/19/2024  Progress Note Due: 4/19/2024   Date of Surgery: NA   Visit # / Visits authorized: 6/14  FOTO: to be completed on visit 8     Precautions: Standard      Time In: 10:49  Time Out: 11:40  Total Billable Time: 51 minutes   Visit Date: 4/23/2024    PTA Visit #: 1/5   Subjective     Patient reports:  Pain in low back during long car rides.    .  He was compliant with home exercise program.  Response to previous treatment: Decreased symptoms of low back and hip pain initially   Functional change: improved gait mechanics and increased activity tolerance with less pain    Pain: 3/10 Soreness  Location: bilateral back      Objective      Objective Measures updated at progress report unless specified.     Treatment     Elio received the treatments listed below:      therapeutic exercises to develop ROM, flexibility, posture, and core stabilization.  See flowsheet below    WITH HELD Therapeutic-Exercise TODAY     Ther-Ex Reps    Bike 5 minutes (not completed)    Wedge  2 minutes (not completed)    Hamstring Stretch with sciatic nerve glide   20 x 3" Each    Posterior Pelvic Tilts  20 x 3"    Lower Trunk Rotations 5 x 10 "    Single Knee to Chest Stretch 5 x 10" each    Piriformis Stretch 2 x 20" each    Figure 4 Stretch 2 x 20" Each    Ricco Stretch  2 x 20" each    Hip Abduction      Manual Hip flexor stretch     Manual Hamstring stretch                 Manual therapy: PT completed joint mobilizations, soft tissue mobilization, and active neuromuscular releases to B quadratus lumborum, B piriformis, L " SI joint, L psoas, L innominate to promote decreased tissue tightness, improved joint mobility, and improved pelvic alignment.  Muscle energy technique to correct Left pelvic upslip.     supervised modalities after being cleared for contradictions: BIPHASIC ESTIM:  Anoop received  electrical stimulation for pain & tissue tightness to the B thoracic paraspinals and B piriformis muscles. Pt received burst mode at a rate of 2 pps for 12 minutes. Anoop tolerated treatment well without any adverse effects.      hot pack for 12 minutes to B lumbosacral muscles with ESTIM .     Patient Education and Home Exercises      Education provided:   - eval findings, plan of care, Home exercise program      Written Home Exercises Provided: yes. Exercises were reviewed and Elio was able to demonstrate them prior to the end of the session.  Elio demonstrated good  understanding of the education provided. See EMR under Patient Instructions for exercises provided during therapy sessions.  Patient Education and Home Exercises       Education provided:   - Plan of Care, Home exercise program, Delayed onset muscle soreness     Written Home Exercises Provided: Patient instructed to cont prior HEP. Exercises were reviewed and Elio was able to demonstrate them prior to the end of the session.  Elio demonstrated good  understanding of the education provided. See Electronic Medical Record under Patient Instructions for exercises provided during therapy sessions    Assessment     Anoop is a 73 y.o. male referred to outpatient Physical Therapy with a medical diagnosis of acute low back pain . Patient presents with low back and L hip pain, decreased core and lower extremity muscle weakness, tissue tightness, impaired movement mechanics, and decreased motor control. Patient's impairments are currently limiting his overall mobility and activity tolerance.  Patient requires min verbal and visual cues to progress through completion of exercises  "with proper alignment, speed of movement, count, and hold times.  Patient reports feeling mod tenderness during application of manual therapy techniques, but at end of PT treatment session, patient states "My back feels alright now".           Elio Is progressing well towards his goals.   Patient prognosis is Good.     Patient will continue to benefit from skilled outpatient physical therapy to address the deficits listed in the problem list box on initial evaluation, provide pt/family education and to maximize pt's level of independence in the home and community environment.     Patient's spiritual, cultural and educational needs considered and pt agreeable to plan of care and goals.     Anticipated barriers to physical therapy: chronicity of low back pain episodes, compliance with Home exercise program, Patient's high activity levels     Goals:   Short Term Goals: 2 weeks   Patient will independently complete Home exercise program with correct form.   Patient will report decreased pain to less than or equal to 6/10 for improved quality of life.                    Long Term Goals: 3 weeks   Patient will report decreased pain to less than or equal to 3/10 for improved quality of life.   Patient will have increased B lower extremity muscle strength to greater than or equal to 4+/5 for improved gait mechanics.   Patient will have increased FOTO score to greater than or equal to 65% for improved function.   Patient will have increased B hamstring length to less than or equal to -5 degrees.   Patient will report decreased pain to less than or equal to 3/10 with sit to stand transfer for improved quality of life.     Plan     Plan of care Certification: 3/28/2024 to 5/10/2024.  Outpatient Physical Therapy 2 times weekly for 4 weeks to include the following interventions: Electrical Stimulation unattended, Manual Therapy, Moist Heat/ Ice, Patient Education, Therapeutic Activities, Therapeutic Exercise, and Ultrasound. "   Recert 2 x 4 weeks     Continue Plan of Care per PT order to progress patient toward rehab goals as tolerated by patient.   Karen Marshall, PTA,    4/23/2024

## 2024-04-25 NOTE — PROGRESS NOTES
"[facility]   Physical Therapy Treatment Note      Name: Anoop Harkins Sr.  Clinic Number: [id number]    Visit Date: 4/23/2024  Therapy Diagnosis:        Encounter Diagnoses   Name Primary?    S/P right rotator cuff repair      Acute pain of right shoulder Yes        Physician: Keven Vazquez Jr., *     Physician Orders: PT Eval and Treat   Medical Diagnosis from Referral: R rotator cuff repair and subacromial debridement   Evaluation Date: 1/19/2024  Authorization Period Expiration: 1/18/2025  Plan of Care Expiration: 5/10/2024  Progress Note Due: 5/10/2024  Date of Surgery: 1/18/2024  Visit # / Visits authorized: 34/44  FOTO: to be completed on visit 40     Precautions: Standard      Time In: 10:15  Time Out: 10:48  Total Billable Time: 33 minutes     PTA Visit #: 1/5  Subjective   Patient reports: Patient reports pain in Right shoulder with ER. Patient states he tripped yesterday and fell on onto outstretched hands to prevent falling on his face.     He was compliant with home exercise program.  Response to previous treatment: No adverse effects reported   Functional change:  increased active range of motion with less pain     Pain: 4/10   Location: right shoulder    Objective    Objective Measures updated at progress report unless specified.   Treatment   Elio received the treatments listed below:      therapeutic exercises to develop strength, endurance, ROM, and posture.  Therapeutic activities  to improve functional performance .See flowsheet below   *indicates increases in reps or resistance during this treatment session     Ther-Ex Reps    UBE 4/4 L2   Standing ball rolls flexion 10 x 5" each    Finger Ladder 5 x 10" (not today)   Cane flexion stretch 5 x 10" (not today)   Bicep Curls  2 x 10 4 #(not today)   IR with pulleys  3 minutes (not today)   Therapeutic-Activities  Reps    I,Y,T's 20 x 1# each    Prone Rows   2 x 10, 2#    Prone ABD  2 x 10 1#*   Prone Extension  2 x 10, " 2#    TB IR and ER  3 x 10 green TB    Standing PNF patterns  10 x each red TB    Rows with Tband  3 x 10 green  TB    B extension with Tband  3 x 10 green TB    Spider génesis  3 x up and down red TB (not today)   Wall Clock  3 x red TB (not today)   ER walk outs with TB  2 x 10 red (unable)   IR walk outs with TB  2 x 10 red (unable)       Not completed: Modalities: PT applied biphasic ESTIM to R rhomboid, upper trapezius, bicep, and pectoralis to decrease tissue irritation and promote increased blood flow to decrease inflammation of R shoulder structures. Patient received burst modulation at 2 pps for 10 minutes with ICE. (Completed with low back modalities at end of session)    Patient Education and Home Exercises       Education provided:   - Plan of Care, Delayed onset muscle soreness, Home exercise program     Written Home Exercises Provided: Patient instructed to cont prior HEP. Exercises were reviewed and Elio was able to demonstrate them prior to the end of the session.  Elio demonstrated fair  understanding of the education provided. See Electronic Medical Record under Patient Instructions for exercises provided during therapy sessions    Assessment     Anoop is a 73 y.o. male referred to outpatient Physical Therapy with a medical diagnosis of R rotator cuff repair and subacromial debridement. Patient presents with R shoulder pain, decreased R shoulder ROM, decreased R UE muscle strength and motor control. Patient's impairments are currently limiting his use of R UE for ADLs and patient is R hand dominant. Patient demonstrates increasing R UE strength and stability with exercises. Patient continues to report anterior R shoulder pain upon arrival to PT that has recently been limiting his activity tolerance and sleep.  LPTA with-held Therapeutic exercises and Therapeutic activities that cause increased pain/discomfort in Right shoulder.  Patient's active range of motion not limited by pain.  Patient declined  modalities.     Patient's prognosis is good.   Patient will continue to benefit from skilled outpatient physical therapy to address the deficits listed in the problem list box on initial evaluation, provide pt/family education and to maximize pt's level of independence in the home and community environment.     Patient's spiritual, cultural and educational needs considered and pt agreeable to plan of care and goals.     Anticipated barriers to physical therapy:  compliance with Home exercise program and post surgical restrictions     Goals:   Short Term Goals: 3 weeks   Patient will independently complete Home exercise program with correct form.   Patient will report R shoulder pain less than or equal to 3/10 for improved quality of life.      Long Term Goals: 6 weeks   Pt will increase R shoulder flexion to 120 degrees, external rotation to C4, and internal rotation to L2 for independent dressing  Pt will increase R upper extremity to 4/5 to allow patient to perform activities of daily living independently  Pt will report decrease in pain to 1/10 to improve quality of life.  Pt will place 5 pound object in overhead shelf without hike and with less than or equal to 2/10 pain to allow patient to return to prior level of function   Patient will have increased FOTO score to greater than or equal to 65% for improved function.     Plan   Plan of care Certification: 1/19/2024 to 3/1/2024.     Outpatient Physical Therapy 3 times weekly for 6 weeks to include the following interventions: Electrical Stimulation unattended, Manual Therapy, Moist Heat/ Ice, Neuromuscular Re-ed, Patient Education, Therapeutic Activities, Therapeutic Exercise, Ultrasound, and Vasopneumatic device.     Continue Plan of Care per PT order to progress patient toward rehab goals as tolerated by patient.   AUSTYN Ortiz      4/25/2024

## 2024-04-26 ENCOUNTER — CLINICAL SUPPORT (OUTPATIENT)
Dept: REHABILITATION | Facility: HOSPITAL | Age: 74
End: 2024-04-26
Payer: MEDICARE

## 2024-04-26 DIAGNOSIS — M54.42 ACUTE LEFT-SIDED LOW BACK PAIN WITH LEFT-SIDED SCIATICA: ICD-10-CM

## 2024-04-26 DIAGNOSIS — M25.511 ACUTE PAIN OF RIGHT SHOULDER: Primary | ICD-10-CM

## 2024-04-26 PROCEDURE — 97530 THERAPEUTIC ACTIVITIES: CPT | Mod: KX,CQ

## 2024-04-26 PROCEDURE — 97110 THERAPEUTIC EXERCISES: CPT | Mod: CQ

## 2024-04-26 PROCEDURE — 97014 ELECTRIC STIMULATION THERAPY: CPT | Mod: CQ

## 2024-04-26 NOTE — PROGRESS NOTES
"[facility]   Physical Therapy Treatment Note      Name: Anoop Harkins Sr.  Clinic Number: [id number]    Visit Date: 4/26/2024  Therapy Diagnosis:        Encounter Diagnoses   Name Primary?    S/P right rotator cuff repair      Acute pain of right shoulder Yes        Physician: Keven Vazquez Jr., *     Physician Orders: PT Eval and Treat   Medical Diagnosis from Referral: R rotator cuff repair and subacromial debridement   Evaluation Date: 1/19/2024  Authorization Period Expiration: 1/18/2025  Plan of Care Expiration: 5/10/2024  Progress Note Due: 5/10/2024  Date of Surgery: 1/18/2024  Visit # / Visits authorized: 34/44  FOTO: to be completed on visit 40     Precautions: Standard      Time In: 8:00  Time Out: 8:45  Total Billable Time: 45 minutes     PTA Visit #: 1/5  Subjective   Patient reports: "It was hurting pretty bad last night but it's okay now".     He was compliant with home exercise program.  Response to previous treatment: No adverse effects reported   Functional change:  increased active range of motion with less pain     Pain: 4/10   Location: right shoulder    Objective    Objective Measures updated at progress report unless specified.   Treatment   Elio received the treatments listed below:      therapeutic exercises to develop strength, endurance, ROM, and posture.  Therapeutic activities  to improve functional performance .See flowsheet below   *indicates increases in reps or resistance during this treatment session     Ther-Ex Reps    UBE 4/4 L2   Standing ball rolls flexion 10 x 5" each    Finger Ladder 5 x 10" (not today)   Cane flexion stretch 5 x 10" (not today)   Bicep Curls  2 x 10 4 #(not today)   IR with pulleys  3 minutes (not today)   Therapeutic-Activities  Reps    I,Y,T's 20 x 1# each    Prone Rows   2 x 10, 2#    Prone ABD  2 x 10 1#   Prone Extension  2 x 10, 2#    TB IR and ER  3 x 10 green TB    Standing PNF patterns  10 x each red TB    Rows with Tband "  3 x 10 green  TB    B extension with Tband  3 x 10 green TB    Spider génesis  3 x up and down red TB (not today)   Wall Clock  3 x red TB (not today)   ER walk outs with TB  2 x 10 red (unable)   IR walk outs with TB  2 x 10 red (unable)       Not completed: Modalities: PT applied biphasic ESTIM to R rhomboid, upper trapezius, bicep, and pectoralis to decrease tissue irritation and promote increased blood flow to decrease inflammation of R shoulder structures. Patient received burst modulation at 2 pps for 10 minutes with ICE. (Completed with low back modalities at end of session)    Patient Education and Home Exercises       Education provided:   - Plan of Care, Delayed onset muscle soreness, Home exercise program     Written Home Exercises Provided: Patient instructed to cont prior HEP. Exercises were reviewed and Elio was able to demonstrate them prior to the end of the session.  Elio demonstrated fair  understanding of the education provided. See Electronic Medical Record under Patient Instructions for exercises provided during therapy sessions    Assessment     Anoop is a 73 y.o. male referred to outpatient Physical Therapy with a medical diagnosis of R rotator cuff repair and subacromial debridement. Patient presents with R shoulder pain, decreased R shoulder ROM, decreased R UE muscle strength and motor control. Patient's impairments are currently limiting his use of R UE for ADLs and patient is R hand dominant. Pt continues to demonstrate increasing R UE strength and stability with exercises. Pt reports some anterior shoulder pain with movement from supine<>sit.  Pt progressed through tx with improve scapular activation.  Pt's active range of motion not limited by pain.  No adverse effects noted.     Patient's prognosis is good.   Patient will continue to benefit from skilled outpatient physical therapy to address the deficits listed in the problem list box on initial evaluation, provide pt/family education  and to maximize pt's level of independence in the home and community environment.     Patient's spiritual, cultural and educational needs considered and pt agreeable to plan of care and goals.     Anticipated barriers to physical therapy:  compliance with Home exercise program and post surgical restrictions     Goals:   Short Term Goals: 3 weeks   Patient will independently complete Home exercise program with correct form.   Patient will report R shoulder pain less than or equal to 3/10 for improved quality of life.      Long Term Goals: 6 weeks   Pt will increase R shoulder flexion to 120 degrees, external rotation to C4, and internal rotation to L2 for independent dressing  Pt will increase R upper extremity to 4/5 to allow patient to perform activities of daily living independently  Pt will report decrease in pain to 1/10 to improve quality of life.  Pt will place 5 pound object in overhead shelf without hike and with less than or equal to 2/10 pain to allow patient to return to prior level of function   Patient will have increased FOTO score to greater than or equal to 65% for improved function.     Plan   Plan of care Certification: 1/19/2024 to 3/1/2024.     Outpatient Physical Therapy 3 times weekly for 6 weeks to include the following interventions: Electrical Stimulation unattended, Manual Therapy, Moist Heat/ Ice, Neuromuscular Re-ed, Patient Education, Therapeutic Activities, Therapeutic Exercise, Ultrasound, and Vasopneumatic device.     Continue Plan of Care per PT order to progress patient toward rehab goals as tolerated by patient.   AUSTYN Canela   4/26/2024

## 2024-04-26 NOTE — PROGRESS NOTES
"[facility]   Physical Therapy Treatment Note      Name: Anoop Harkins Sr.  Clinic Number: [id number]    Therapy Diagnosis: low back pain with L side sciatica        Physician: Keven Vazquez Jr., *     Physician Orders: PT Eval and Treat   Medical Diagnosis from Referral: acute low back pain  Evaluation Date: 3/28/2024  Authorization Period Expiration: 1/18/2025  Plan of Care Expiration: 4/19/2024  Progress Note Due: 4/19/2024   Date of Surgery: NA   Visit # / Visits authorized: 6/14  FOTO: to be completed on visit 8     Precautions: Standard      Time In: 8:45  Time Out: 9:26  Total Billable Time: 41 minutes   Visit Date: 4/26/2024    PTA Visit #: 1/5   Subjective     Patient reports:  Pain in low back during long car rides.    .  He was compliant with home exercise program.  Response to previous treatment: Decreased symptoms of low back and hip pain initially   Functional change: improved gait mechanics and increased activity tolerance with less pain    Pain: 3/10 Soreness  Location: bilateral back      Objective      Objective Measures updated at progress report unless specified.     Treatment     Elio received the treatments listed below:      therapeutic exercises to develop ROM, flexibility, posture, and core stabilization.  See flowsheet below    WITH HELD Therapeutic-Exercise TODAY     Ther-Ex Reps    Bike 5 minutes (not completed)    Wedge  2 minutes (not completed)    Hamstring Stretch with sciatic nerve glide   20 x 3" Each    Posterior Pelvic Tilts  20 x 3"    Lower Trunk Rotations 5 x 10 "    Single Knee to Chest Stretch 5 x 10" each    Piriformis Stretch 2 x 20" each    Figure 4 Stretch 2 x 20" Each    Ricco Stretch  2 x 20" each    Hip Abduction      Manual Hip flexor stretch     Manual Hamstring stretch                 WITH-HELD----Manual therapy: PT completed joint mobilizations, soft tissue mobilization, and active neuromuscular releases to B quadratus lumborum, B " piriformis, L SI joint, L psoas, L innominate to promote decreased tissue tightness, improved joint mobility, and improved pelvic alignment.  Muscle energy technique to correct Left pelvic upslip.     supervised modalities after being cleared for contradictions: BIPHASIC ESTIM:  Anoop received  electrical stimulation for pain & tissue tightness to the right quadriceps muscles. Pt received burst mode at a rate of 2 pps for 12 minutes. Anoop tolerated treatment well without any adverse effects.      hot pack for 12 minutes to B lumbosacral muscles with ESTIM .     Patient Education and Home Exercises      Education provided:   - eval findings, plan of care, Home exercise program      Written Home Exercises Provided: yes. Exercises were reviewed and Elio was able to demonstrate them prior to the end of the session.  Elio demonstrated good  understanding of the education provided. See EMR under Patient Instructions for exercises provided during therapy sessions.  Patient Education and Home Exercises       Education provided:   - Plan of Care, Home exercise program, Delayed onset muscle soreness     Written Home Exercises Provided: Patient instructed to cont prior HEP. Exercises were reviewed and Elio was able to demonstrate them prior to the end of the session.  Elio demonstrated good  understanding of the education provided. See Electronic Medical Record under Patient Instructions for exercises provided during therapy sessions    Assessment     Anoop is a 73 y.o. male referred to outpatient Physical Therapy with a medical diagnosis of acute low back pain . Patient presents with low back and L hip pain, decreased core and lower extremity muscle weakness, tissue tightness, impaired movement mechanics, and decreased motor control. Patient's impairments are currently limiting his overall mobility and activity tolerance.  Pt requires min verbal and visual cues to progress through completion of exercises with proper  alignment, speed of movement, count, and hold times.  Pt reports decreased tenderness in ischial tuberosity following modalities. No adverse effects noted.          Elio Is progressing well towards his goals.   Patient prognosis is Good.     Patient will continue to benefit from skilled outpatient physical therapy to address the deficits listed in the problem list box on initial evaluation, provide pt/family education and to maximize pt's level of independence in the home and community environment.     Patient's spiritual, cultural and educational needs considered and pt agreeable to plan of care and goals.     Anticipated barriers to physical therapy: chronicity of low back pain episodes, compliance with Home exercise program, Patient's high activity levels     Goals:   Short Term Goals: 2 weeks   Patient will independently complete Home exercise program with correct form.   Patient will report decreased pain to less than or equal to 6/10 for improved quality of life.                    Long Term Goals: 3 weeks   Patient will report decreased pain to less than or equal to 3/10 for improved quality of life.   Patient will have increased B lower extremity muscle strength to greater than or equal to 4+/5 for improved gait mechanics.   Patient will have increased FOTO score to greater than or equal to 65% for improved function.   Patient will have increased B hamstring length to less than or equal to -5 degrees.   Patient will report decreased pain to less than or equal to 3/10 with sit to stand transfer for improved quality of life.     Plan     Plan of care Certification: 3/28/2024 to 5/10/2024.  Outpatient Physical Therapy 2 times weekly for 4 weeks to include the following interventions: Electrical Stimulation unattended, Manual Therapy, Moist Heat/ Ice, Patient Education, Therapeutic Activities, Therapeutic Exercise, and Ultrasound.   Recert 2 x 4 weeks     Continue Plan of Care per PT order to progress patient  toward rehab goals as tolerated by patient.   AUSTYN Canela  4/26/2024

## 2024-04-29 ENCOUNTER — CLINICAL SUPPORT (OUTPATIENT)
Dept: REHABILITATION | Facility: HOSPITAL | Age: 74
End: 2024-04-29
Payer: MEDICARE

## 2024-04-29 DIAGNOSIS — M54.42 ACUTE LEFT-SIDED LOW BACK PAIN WITH LEFT-SIDED SCIATICA: ICD-10-CM

## 2024-04-29 DIAGNOSIS — M25.511 ACUTE PAIN OF RIGHT SHOULDER: Primary | ICD-10-CM

## 2024-04-29 PROCEDURE — 97110 THERAPEUTIC EXERCISES: CPT | Mod: KX,CQ

## 2024-04-29 PROCEDURE — 97014 ELECTRIC STIMULATION THERAPY: CPT | Mod: CQ

## 2024-04-29 PROCEDURE — 97140 MANUAL THERAPY 1/> REGIONS: CPT | Mod: CQ

## 2024-04-29 NOTE — PROGRESS NOTES
"[facility]   Physical Therapy Treatment Note      Name: Anoop Harkins Sr.  Clinic Number: [id number]    Visit Date: 4/29/2024  Therapy Diagnosis:        Encounter Diagnoses   Name Primary?    S/P right rotator cuff repair      Acute pain of right shoulder Yes        Physician: Keven Vazquez Jr., *     Physician Orders: PT Eval and Treat   Medical Diagnosis from Referral: R rotator cuff repair and subacromial debridement   Evaluation Date: 1/19/2024  Authorization Period Expiration: 1/18/2025  Plan of Care Expiration: 5/10/2024  Progress Note Due: 5/10/2024  Date of Surgery: 1/18/2024  Visit # / Visits authorized: 36/44  FOTO: to be completed on visit 40     Precautions: Standard      Time In: 12:55  Time Out: 13:35  Total Billable Time: 40 minutes     PTA Visit #: 2/5  Subjective   Patient reports: "When it aches it aches.  I can be sitting in the chair with my arm propped up on a pillow and it just starts burning".     He was compliant with home exercise program.  Response to previous treatment: No adverse effects reported   Functional change:  increased active range of motion with less pain     Pain: 4/10   Location: right shoulder    Objective    Objective Measures updated at progress report unless specified.   Treatment   Elio received the treatments listed below:      therapeutic exercises to develop strength, endurance, ROM, and posture.  Therapeutic activities  to improve functional performance .See flowsheet below   *indicates increases in reps or resistance during this treatment session     Ther-Ex Reps    UBE 4/4 L2   Standing ball rolls flexion 10 x 5" each    Finger Ladder 5 x 10"    Cane flexion stretch 5 x 10" (not today)   Bicep Curls  2 x 10 4 #   IR with pulleys  3 minutes    Therapeutic-Activities  Reps    I,Y,T's 20 x 1# each    Prone Rows   2 x 10, 2#    Prone ABD  2 x 10 1#   Prone Extension  2 x 10, 2#    TB IR and ER  3 x 10 green TB    Standing PNF patterns  10 x " each red TB    Rows with Tband  3 x 10 green  TB    B extension with Tband  3 x 10 green TB    Spider génesis  3 x up and down red TB (not today)   Wall Clock  3 x red TB (not today)   ER walk outs with TB  2 x 10 red (unable)   IR walk outs with TB  2 x 10 red (unable)       Modalities:  LPTA applied biphasic ESTIM to R rhomboid, upper trapezius, bicep, and pectoralis to decrease tissue irritation and promote increased blood flow to decrease inflammation of R shoulder structures. Patient received burst modulation at 2 pps for 10 minutes with ICE. (Completed with low back modalities at end of session)    Patient Education and Home Exercises       Education provided:   - Plan of Care, Delayed onset muscle soreness, Home exercise program     Written Home Exercises Provided: Patient instructed to cont prior HEP. Exercises were reviewed and Elio was able to demonstrate them prior to the end of the session.  Elio demonstrated fair  understanding of the education provided. See Electronic Medical Record under Patient Instructions for exercises provided during therapy sessions    Assessment     Anoop is a 73 y.o. male referred to outpatient Physical Therapy with a medical diagnosis of R rotator cuff repair and subacromial debridement. Patient presents with R shoulder pain, decreased R shoulder ROM, decreased R UE muscle strength and motor control. Patient's impairments are currently limiting his use of R UE for ADLs and patient is R hand dominant.  Patient continues to report pain in Right UE at end ROM with ER and flexion.  All therapeutic exercises and Therapeutic activities completed within pain limitations.        Patient's prognosis is good.   Patient will continue to benefit from skilled outpatient physical therapy to address the deficits listed in the problem list box on initial evaluation, provide pt/family education and to maximize pt's level of independence in the home and community environment.     Patient's  spiritual, cultural and educational needs considered and pt agreeable to plan of care and goals.     Anticipated barriers to physical therapy:  compliance with Home exercise program and post surgical restrictions     Goals:   Short Term Goals: 3 weeks   Patient will independently complete Home exercise program with correct form.   Patient will report R shoulder pain less than or equal to 3/10 for improved quality of life.      Long Term Goals: 6 weeks   Pt will increase R shoulder flexion to 120 degrees, external rotation to C4, and internal rotation to L2 for independent dressing  Pt will increase R upper extremity to 4/5 to allow patient to perform activities of daily living independently  Pt will report decrease in pain to 1/10 to improve quality of life.  Pt will place 5 pound object in overhead shelf without hike and with less than or equal to 2/10 pain to allow patient to return to prior level of function   Patient will have increased FOTO score to greater than or equal to 65% for improved function.     Plan   Plan of care Certification: 1/19/2024 to 3/1/2024.     Outpatient Physical Therapy 3 times weekly for 6 weeks to include the following interventions: Electrical Stimulation unattended, Manual Therapy, Moist Heat/ Ice, Neuromuscular Re-ed, Patient Education, Therapeutic Activities, Therapeutic Exercise, Ultrasound, and Vasopneumatic device.     Continue Plan of Care per PT order to progress patient toward rehab goals as tolerated by patient.   AUSTYN Ortiz   4/29/2024

## 2024-05-01 NOTE — PROGRESS NOTES
"[facility]   Physical Therapy Treatment Note      Name: Anoop Harkins Sr.  Clinic Number: [id number]    Therapy Diagnosis: low back pain with L side sciatica        Physician: Keven Vazquez Jr., *     Physician Orders: PT Eval and Treat   Medical Diagnosis from Referral: acute low back pain  Evaluation Date: 3/28/2024  Authorization Period Expiration: 1/18/2025  Plan of Care Expiration: 4/19/2024  Progress Note Due: 4/19/2024   Date of Surgery: NA   Visit # / Visits authorized: 8/14  FOTO: to be completed on visit 8     Precautions: Standard      Time In:  13:36  Time Out: 14:20  Total Billable Time: 44 minutes   Visit Date: 4/29/2024    PTA Visit #: 2/5  Subjective     Patient reports:  Pain in low back during long car rides, and when sitting in certain chairs.    .  He was compliant with home exercise program.  Response to previous treatment: Decreased symptoms of low back and hip pain initially   Functional change: improved gait mechanics and increased activity tolerance with less pain    Pain: 3/10 Soreness  Location: Ischial tuberosity; acetabulum     Objective      Objective Measures updated at progress report unless specified.     Treatment     Elio received the treatments listed below:      therapeutic exercises to develop ROM, flexibility, posture, and core stabilization.  See flowsheet below    WITH HELD Therapeutic-Exercise TODAY     Ther-Ex Reps    Nustep  5 minutes    Wedge  2 minutes    Hamstring Stretch with sciatic nerve glide   20 x 3" Each    Posterior Pelvic Tilts  20 x 3"    Lower Trunk Rotations 5 x 10 "    Single Knee to Chest Stretch 5 x 10" each    Piriformis Stretch 2 x 20" each    Figure 4 Stretch 2 x 20" Each    Ricco Stretch  2 x 20" each    Hip Abduction      Manual Hip flexor stretch  Completed    Manual Hamstring stretch  Completed                WITH-HELD----Manual therapy: PT completed joint mobilizations, soft tissue mobilization, and active " neuromuscular releases to B quadratus lumborum, B piriformis, L SI joint, L psoas, L innominate to promote decreased tissue tightness, improved joint mobility, and improved pelvic alignment.  Muscle energy technique to correct Left pelvic upslip.     supervised modalities after being cleared for contradictions: BIPHASIC ESTIM:  Anoop received  electrical stimulation for pain & tissue tightness to the right quadriceps muscles. Pt received burst mode at a rate of 2 pps for 12 minutes. Anoop tolerated treatment well without any adverse effects.      hot pack for 12 minutes to B lumbosacral muscles with ESTIM .     Patient Education and Home Exercises      Education provided:   - eval findings, plan of care, Home exercise program      Written Home Exercises Provided: yes. Exercises were reviewed and Elio was able to demonstrate them prior to the end of the session.  Elio demonstrated good  understanding of the education provided. See EMR under Patient Instructions for exercises provided during therapy sessions.  Patient Education and Home Exercises       Education provided:   - Plan of Care, Home exercise program, Delayed onset muscle soreness     Written Home Exercises Provided: Patient instructed to cont prior HEP. Exercises were reviewed and Elio was able to demonstrate them prior to the end of the session.  Elio demonstrated good  understanding of the education provided. See Electronic Medical Record under Patient Instructions for exercises provided during therapy sessions    Assessment     Anoop is a 73 y.o. male referred to outpatient Physical Therapy with a medical diagnosis of acute low back pain . Patient presents with low back and L hip pain, decreased core and lower extremity muscle weakness, tissue tightness, impaired movement mechanics, and decreased motor control. Patient's impairments are currently limiting his overall mobility and activity tolerance.  Pt requires min verbal and visual cues to progress  through completion of exercises with proper alignment, speed of movement, count, and hold times.  Pt reports decreased tenderness in ischial tuberosity following modalities. No adverse effects noted.          Elio Is progressing well towards his goals.   Patient prognosis is Good.     Patient will continue to benefit from skilled outpatient physical therapy to address the deficits listed in the problem list box on initial evaluation, provide pt/family education and to maximize pt's level of independence in the home and community environment.     Patient's spiritual, cultural and educational needs considered and pt agreeable to plan of care and goals.     Anticipated barriers to physical therapy: chronicity of low back pain episodes, compliance with Home exercise program, Patient's high activity levels     Goals:   Short Term Goals: 2 weeks   Patient will independently complete Home exercise program with correct form.   Patient will report decreased pain to less than or equal to 6/10 for improved quality of life.                    Long Term Goals: 3 weeks   Patient will report decreased pain to less than or equal to 3/10 for improved quality of life.   Patient will have increased B lower extremity muscle strength to greater than or equal to 4+/5 for improved gait mechanics.   Patient will have increased FOTO score to greater than or equal to 65% for improved function.   Patient will have increased B hamstring length to less than or equal to -5 degrees.   Patient will report decreased pain to less than or equal to 3/10 with sit to stand transfer for improved quality of life.     Plan     Plan of care Certification: 3/28/2024 to 5/10/2024.  Outpatient Physical Therapy 2 times weekly for 4 weeks to include the following interventions: Electrical Stimulation unattended, Manual Therapy, Moist Heat/ Ice, Patient Education, Therapeutic Activities, Therapeutic Exercise, and Ultrasound.   Recert 2 x 4 weeks     Continue Plan  of Care per PT order to progress patient toward rehab goals as tolerated by patient.   AUSTYN Ortiz   5/1/2024

## 2024-05-07 ENCOUNTER — CLINICAL SUPPORT (OUTPATIENT)
Dept: REHABILITATION | Facility: HOSPITAL | Age: 74
End: 2024-05-07
Payer: MEDICARE

## 2024-05-07 DIAGNOSIS — M25.511 ACUTE PAIN OF RIGHT SHOULDER: Primary | ICD-10-CM

## 2024-05-07 DIAGNOSIS — M54.42 ACUTE LEFT-SIDED LOW BACK PAIN WITH LEFT-SIDED SCIATICA: ICD-10-CM

## 2024-05-07 PROCEDURE — 97110 THERAPEUTIC EXERCISES: CPT | Mod: CQ

## 2024-05-07 PROCEDURE — 97140 MANUAL THERAPY 1/> REGIONS: CPT | Mod: CQ

## 2024-05-07 PROCEDURE — 97530 THERAPEUTIC ACTIVITIES: CPT | Mod: CQ

## 2024-05-07 NOTE — PROGRESS NOTES
"[facility]   Physical Therapy Treatment Note      Name: Anoop Harkins Sr.  Clinic Number: [id number]    Visit Date: 5/7/2024  Therapy Diagnosis:        Encounter Diagnoses   Name Primary?    S/P right rotator cuff repair      Acute pain of right shoulder Yes        Physician: Keven Vazquez Jr., *     Physician Orders: PT Eval and Treat   Medical Diagnosis from Referral: R rotator cuff repair and subacromial debridement   Evaluation Date: 1/19/2024  Authorization Period Expiration: 1/18/2025  Plan of Care Expiration: 5/10/2024  Progress Note Due: 5/10/2024  Date of Surgery: 1/18/2024  Visit # / Visits authorized: 37/44  FOTO: to be completed on visit 40     Precautions: Standard      Time In: 8:03  Time Out: 8:43  Total Billable Time: 40 minutes     PTA Visit #: 3/5  Subjective   Patient reports: Discomfort in Right shoulder when "moving it a certain way".       He was compliant with home exercise program.  Response to previous treatment: No adverse effects reported   Functional change:  increased active range of motion with less pain     Pain: 4/10   Location: right shoulder    Objective    Objective Measures updated at progress report unless specified.   Treatment   Elio received the treatments listed below:      therapeutic exercises to develop strength, endurance, ROM, and posture.  Therapeutic activities  to improve functional performance .See flowsheet below   *indicates increases in reps or resistance during this treatment session     Ther-Ex Reps    UBE 4/4 L2   Standing ball rolls flexion 10 x 5" each    Finger Ladder 5 x 10"    Cane flexion stretch 5 x 10" (not today)   Bicep Curls  2 x 10 4 #   IR with pulleys  3 minutes    Therapeutic-Activities  Reps    I,Y,T's 20 x 1# each    Prone Rows   2 x 10, 2#    Prone ABD  2 x 10 1#   Prone Extension  2 x 10, 2#    TB IR and ER  3 x 10 green TB    Standing PNF patterns  10 x each red TB    Rows with Tband  3 x 10 green  TB    B " extension with Tband  3 x 10 green TB    Spider génesis  3 x up and down red TB (not today)   Wall Clock  3 x red TB (not today)   ER walk outs with TB  2 x 10 red (unable)   IR walk outs with TB  2 x 10 red (unable)     Manual therapy techniques:  Grade 1 joint mobs including oscillations to Right GH joint prior to Passive range of motion.     Not completed: Modalities:  LPTA applied biphasic ESTIM to R rhomboid, upper trapezius, bicep, and pectoralis to decrease tissue irritation and promote increased blood flow to decrease inflammation of R shoulder structures. Patient received burst modulation at 2 pps for 10 minutes with ICE. (Completed with low back modalities at end of session)    Patient Education and Home Exercises       Education provided:   - Plan of Care, Delayed onset muscle soreness, Home exercise program     Written Home Exercises Provided: Patient instructed to cont prior HEP. Exercises were reviewed and Elio was able to demonstrate them prior to the end of the session.  Elio demonstrated fair  understanding of the education provided. See Electronic Medical Record under Patient Instructions for exercises provided during therapy sessions    Assessment     Anoop is a 73 y.o. male referred to outpatient Physical Therapy with a medical diagnosis of R rotator cuff repair and subacromial debridement. Patient presents with R shoulder pain, decreased R shoulder ROM, decreased R UE muscle strength and motor control. Patient's impairments are currently limiting his use of R UE for ADLs and patient is R hand dominant.  Patient continues to report pain in Right UE at end ROM with ER and flexion.  All therapeutic exercises and Therapeutic activities completed within pain limitations.        Patient's prognosis is good.   Patient will continue to benefit from skilled outpatient physical therapy to address the deficits listed in the problem list box on initial evaluation, provide pt/family education and to maximize  pt's level of independence in the home and community environment.     Patient's spiritual, cultural and educational needs considered and pt agreeable to plan of care and goals.     Anticipated barriers to physical therapy:  compliance with Home exercise program and post surgical restrictions     Goals:   Short Term Goals: 3 weeks   Patient will independently complete Home exercise program with correct form.   Patient will report R shoulder pain less than or equal to 3/10 for improved quality of life.      Long Term Goals: 6 weeks   Pt will increase R shoulder flexion to 120 degrees, external rotation to C4, and internal rotation to L2 for independent dressing  Pt will increase R upper extremity to 4/5 to allow patient to perform activities of daily living independently  Pt will report decrease in pain to 1/10 to improve quality of life.  Pt will place 5 pound object in overhead shelf without hike and with less than or equal to 2/10 pain to allow patient to return to prior level of function   Patient will have increased FOTO score to greater than or equal to 65% for improved function.     Plan   Plan of care Certification: 1/19/2024 to 3/1/2024.     Outpatient Physical Therapy 3 times weekly for 6 weeks to include the following interventions: Electrical Stimulation unattended, Manual Therapy, Moist Heat/ Ice, Neuromuscular Re-ed, Patient Education, Therapeutic Activities, Therapeutic Exercise, Ultrasound, and Vasopneumatic device.     Continue Plan of Care per PT order to progress patient toward rehab goals as tolerated by patient.   AUSTYN Ortiz   5/7/2024

## 2024-05-08 NOTE — PROGRESS NOTES
"[facility]   Physical Therapy Treatment Note      Name: Anoop Harkins Sr.  Clinic Number: [id number]    Therapy Diagnosis: low back pain with L side sciatica        Physician: Keven Vazquez Jr., *     Physician Orders: PT Eval and Treat   Medical Diagnosis from Referral: acute low back pain  Evaluation Date: 3/28/2024  Authorization Period Expiration: 1/18/2025  Plan of Care Expiration: 4/19/2024  Progress Note Due: 4/19/2024   Date of Surgery: NA   Visit # / Visits authorized: 9/14  FOTO: to be completed on visit 8     Precautions: Standard      Time In:  8:44  Time Out: 9:30   Total Billable Time: 44 minutes   Visit Date: 5/7/2024    PTA Visit #: 3/5  Subjective     Patient reports:  Pain in middle of low back.   .  He was compliant with home exercise program.  Response to previous treatment: Decreased symptoms of low back and hip pain initially   Functional change: improved gait mechanics and increased activity tolerance with less pain    Pain: 3/10 Soreness  Location: Ischial tuberosity; acetabulum     Objective      Objective Measures updated at progress report unless specified.     Treatment     Elio received the treatments listed below:      therapeutic exercises to develop ROM, flexibility, posture, and core stabilization.  See flowsheet below    WITH HELD Therapeutic-Exercise TODAY     Ther-Ex Reps    Nustep  5 minutes    Wedge  2 minutes    Hamstring Stretch with sciatic nerve glide   20 x 3" Each    Posterior Pelvic Tilts  20 x 3"    Lower Trunk Rotations 5 x 10 "    Single Knee to Chest Stretch 5 x 10" each    Piriformis Stretch 2 x 20" each    Figure 4 Stretch 2 x 20" Each    Ricco Stretch  2 x 20" each    Hip Abduction      Manual Hip flexor stretch  Completed    Manual Hamstring stretch  Completed                Manual therapy: LPTA completed joint mobilizations, soft tissue mobilization, and active neuromuscular releases to B quadratus lumborum, B piriformis, L SI " joint, L psoas, L innominate to promote decreased tissue tightness, improved joint mobility, and improved pelvic alignment.     supervised modalities after being cleared for contradictions: BIPHASIC ESTIM:  Anoop received  electrical stimulation for pain & tissue tightness to the right quadriceps muscles. Pt received burst mode at a rate of 2 pps for 12 minutes. Anoop tolerated treatment well without any adverse effects.      hot pack for 12 minutes to B lumbosacral muscles with ESTIM .     Patient Education and Home Exercises      Education provided:   - eval findings, plan of care, Home exercise program      Written Home Exercises Provided: yes. Exercises were reviewed and Elio was able to demonstrate them prior to the end of the session.  Elio demonstrated good  understanding of the education provided. See EMR under Patient Instructions for exercises provided during therapy sessions.  Patient Education and Home Exercises       Education provided:   - Plan of Care, Home exercise program, Delayed onset muscle soreness     Written Home Exercises Provided: Patient instructed to cont prior HEP. Exercises were reviewed and Elio was able to demonstrate them prior to the end of the session.  Elio demonstrated good  understanding of the education provided. See Electronic Medical Record under Patient Instructions for exercises provided during therapy sessions    Assessment     Anoop is a 73 y.o. male referred to outpatient Physical Therapy with a medical diagnosis of acute low back pain . Patient presents with low back and L hip pain, decreased core and lower extremity muscle weakness, tissue tightness, impaired movement mechanics, and decreased motor control. Patient's impairments are currently limiting his overall mobility and activity tolerance.  Pt requires min verbal and visual cues to progress through completion of exercises with proper alignment, speed of movement, count, and hold times.  Pt reports decreased  tenderness in ischial tuberosity following modalities and manual therapy. No adverse effects noted.          Elio Is progressing well towards his goals.   Patient prognosis is Good.     Patient will continue to benefit from skilled outpatient physical therapy to address the deficits listed in the problem list box on initial evaluation, provide pt/family education and to maximize pt's level of independence in the home and community environment.     Patient's spiritual, cultural and educational needs considered and pt agreeable to plan of care and goals.     Anticipated barriers to physical therapy: chronicity of low back pain episodes, compliance with Home exercise program, Patient's high activity levels     Goals:   Short Term Goals: 2 weeks   Patient will independently complete Home exercise program with correct form.   Patient will report decreased pain to less than or equal to 6/10 for improved quality of life.                    Long Term Goals: 3 weeks   Patient will report decreased pain to less than or equal to 3/10 for improved quality of life.   Patient will have increased B lower extremity muscle strength to greater than or equal to 4+/5 for improved gait mechanics.   Patient will have increased FOTO score to greater than or equal to 65% for improved function.   Patient will have increased B hamstring length to less than or equal to -5 degrees.   Patient will report decreased pain to less than or equal to 3/10 with sit to stand transfer for improved quality of life.     Plan     Plan of care Certification: 3/28/2024 to 5/10/2024.  Outpatient Physical Therapy 2 times weekly for 4 weeks to include the following interventions: Electrical Stimulation unattended, Manual Therapy, Moist Heat/ Ice, Patient Education, Therapeutic Activities, Therapeutic Exercise, and Ultrasound.   Recert 2 x 4 weeks     Continue Plan of Care per PT order to progress patient toward rehab goals as tolerated by patient.   Karen  Rolando, AUSTYN   5/8/2024

## 2024-05-09 ENCOUNTER — CLINICAL SUPPORT (OUTPATIENT)
Dept: REHABILITATION | Facility: HOSPITAL | Age: 74
End: 2024-05-09
Payer: MEDICARE

## 2024-05-09 DIAGNOSIS — M54.42 ACUTE LEFT-SIDED LOW BACK PAIN WITH LEFT-SIDED SCIATICA: Primary | ICD-10-CM

## 2024-05-09 DIAGNOSIS — M25.511 ACUTE PAIN OF RIGHT SHOULDER: ICD-10-CM

## 2024-05-09 DIAGNOSIS — R52 PAIN: ICD-10-CM

## 2024-05-09 PROCEDURE — 97530 THERAPEUTIC ACTIVITIES: CPT | Mod: KX

## 2024-05-09 PROCEDURE — 97140 MANUAL THERAPY 1/> REGIONS: CPT | Mod: KX

## 2024-05-09 PROCEDURE — 97035 APP MDLTY 1+ULTRASOUND EA 15: CPT | Mod: KX

## 2024-05-09 PROCEDURE — 97110 THERAPEUTIC EXERCISES: CPT | Mod: KX

## 2024-05-09 NOTE — PROGRESS NOTES
"[facility]   Physical Therapy Treatment Note      Name: Anoop Hakrins Sr.  Clinic Number: [id number]    Visit Date: 5/9/2024  Therapy Diagnosis:        Encounter Diagnoses   Name Primary?    S/P right rotator cuff repair      Acute pain of right shoulder Yes        Physician: Keven Vazquez Jr., *     Physician Orders: PT Eval and Treat   Medical Diagnosis from Referral: R rotator cuff repair and subacromial debridement   Evaluation Date: 1/19/2024  Authorization Period Expiration: 1/18/2025  Plan of Care Expiration: 5/10/2024  Progress Note Due: 5/10/2024  Date of Surgery: 1/18/2024  Visit # / Visits authorized: 3/44  FOTO: to be completed on visit 40     Precautions: Standard      Time In: 7:45  Time Out: 9:10  Total Billable Time: 85 minutes     PTA Visit #: 0/5  Subjective   Patient reports: continued aching burning pain in anterior R shoulder with resting arm on an armrest that improves when he moves his arm. Patient also reports an area of tenderness in R periscapular muscles.     He was compliant with home exercise program.  Response to previous treatment: No adverse effects reported   Functional change:  increased active range of motion with less pain     Pain: 0/10   Location: right shoulder    Objective    Objective Measures updated at progress report unless specified.   Treatment   Elio received the treatments listed below:      therapeutic exercises to develop strength, endurance, ROM, and posture.  Therapeutic activities  to improve functional performance .See flowsheet below   *indicates increases in reps or resistance during this treatment session     Ther-Ex Reps    UBE 4/4 L2   Standing ball rolls flexion 10 x 5" each    Finger Ladder 5 x 10"    Cane flexion stretch 5 x 10" (not today)   Bicep Curls  2 x 10 4 #   IR with pulleys  3 minutes    Therapeutic-Activities  Reps    I,Y,T's 20 x 2# each (no weight and ABD)   Prone Rows   2 x 10, 2#    Prone ABD  2 x 10    Prone " Extension  2 x 10, 2#    TB IR and ER  3 x 10 green TB    Standing PNF patterns  10 x each red TB (not today)   Rows with Tband  3 x 10 green  TB    B extension with Tband  3 x 10 green TB    Spider génessi  3 x up and down red TB (not today)   Wall Clock  3 x red TB (not today)     Manual therapy techniques for 12 minutes:  PT completed soft tissue mobilization and myofascial releases to anterior R shoulder tissues  and R deltoid to decrease myofascial adhesions and restrictions and decrease tissue tightness and pain. PT also completed active neuromuscular releases to R pectoralis musculature to decrease anterior shoulder tightness.     Modalities:  PT completed ESTIM/US combo to R anterior shoulder musculature including pectoralis and anterior deltoid for 8 minutes to decrease tissue tightness and irritation.     Patient Education and Home Exercises       Education provided:   - Plan of Care, Delayed onset muscle soreness, Home exercise program     Written Home Exercises Provided: Patient instructed to cont prior HEP. Exercises were reviewed and Elio was able to demonstrate them prior to the end of the session.  Elio demonstrated fair  understanding of the education provided. See Electronic Medical Record under Patient Instructions for exercises provided during therapy sessions    Assessment     Anoop is a 73 y.o. male referred to outpatient Physical Therapy with a medical diagnosis of R rotator cuff repair and subacromial debridement. Patient presents with R shoulder pain, decreased R shoulder ROM, decreased R UE muscle strength and motor control. Patient's impairments are currently limiting his use of R UE for ADLs and patient is R hand dominant.  Patient reported anterior R shoulder pain during ABD exercises with resistance, but no pain when resistance was removed. PT noted that patient had multiple areas of significant tissue tightness and restrictions in anterior R shoulder and deltoid. Patient also had a  palpable trigger point in R middle trapezius. Patient tissue tightness and restrictions decreased moderately following modalities and manual interventions although patient continued to report significant tenderness. Today is the last scheduled treatment of patient's plan of care for shoulder treatment. PT and patient agree to hold PT plan of care at this time to allow PT to assess patient's improvements or continued pain following this treatment session.     Patient's prognosis is good.   Patient will continue to benefit from skilled outpatient physical therapy to address the deficits listed in the problem list box on initial evaluation, provide pt/family education and to maximize pt's level of independence in the home and community environment.     Patient's spiritual, cultural and educational needs considered and pt agreeable to plan of care and goals.     Anticipated barriers to physical therapy:  compliance with Home exercise program and post surgical restrictions     Goals:   Short Term Goals: 3 weeks   Patient will independently complete Home exercise program with correct form.-MET    Patient will report R shoulder pain less than or equal to 3/10 for improved quality of life. -MET      Long Term Goals: 6 weeks   Pt will increase R shoulder flexion to 120 degrees, external rotation to C4, and internal rotation to L2 for independent dressing-MET   Pt will increase R upper extremity to 4/5 to allow patient to perform activities of daily living independently-MET   Pt will report decrease in pain to 1/10 to improve quality of life.-MET  Pt will place 5 pound object in overhead shelf without hike and with less than or equal to 2/10 pain to allow patient to return to prior level of function -MET   Patient will have increased FOTO score to greater than or equal to 65% for improved function. -MET 69%    Plan   Plan of care Certification: 1/19/2024 to 5/10/2024.     Outpatient Physical Therapy 2 times weekly for 6 weeks  to include the following interventions: Electrical Stimulation unattended, Manual Therapy, Moist Heat/ Ice, Neuromuscular Re-ed, Patient Education, Therapeutic Activities, Therapeutic Exercise, Ultrasound, and Vasopneumatic device.     Continue Plan of Care per PT order to progress patient toward rehab goals as tolerated by patient.   Mickey Bustillos, PT, DPT    5/9/2024

## 2024-05-09 NOTE — PROGRESS NOTES
[facility]  PT Discharge Note    Name: Anoop Harkins Sr.  Clinic Number: [id number]    Therapy Diagnosis:   Encounter Diagnoses   Name Primary?    Acute left-sided low back pain with left-sided sciatica Yes    Acute pain of right shoulder      Physician: Laila Cruz FNP    Therapy Diagnosis: low back pain with L side sciatica        Physician: Keven Vazquez Jr., *     Physician Orders: PT Eval and Treat   Medical Diagnosis from Referral: acute low back pain  Evaluation Date: 3/28/2024  Authorization Period Expiration: 1/18/2025  Plan of Care Expiration: 4/19/2024  Progress Note Due: 4/19/2024   Date of Surgery: NA   Visit # / Visits authorized: 8/14  FOTO: to be completed on visit 8    ASSESSMENT      Patient reports improved low back pain with no further radicular symptoms into L LE. Patient continues to report posterior hip pain with prolonged sitting specifically in his wife's car. PT recommends X-ray of patient's hip joints specifically L to rule out hip OA.     Discharge reason: Patient has met all of his goals    Discharge FOTO Score: 69%    Goals:   Patient will independently complete Home exercise program with correct form. -MET   Patient will report decreased pain to less than or equal to 6/10 for improved quality of life. -MET                    Long Term Goals: 3 weeks   Patient will report decreased pain to less than or equal to 3/10 for improved quality of life. -MET   Patient will have increased B lower extremity muscle strength to greater than or equal to 4+/5 for improved gait mechanics. -MET   Patient will have increased FOTO score to greater than or equal to 65% for improved function. -MET   Patient will have increased B hamstring length to less than or equal to -5 degrees. -MET   Patient will report decreased pain to less than or equal to 3/10 with sit to stand transfer for improved quality of life.-MET      PLAN   This patient is discharged from Physical  Therapy      Mickey Bustillos, PT,  DPT

## 2024-05-14 ENCOUNTER — DOCUMENTATION ONLY (OUTPATIENT)
Dept: REHABILITATION | Facility: HOSPITAL | Age: 74
End: 2024-05-14
Payer: MEDICARE

## 2024-05-14 NOTE — PROGRESS NOTES
"[facility]  PT Discharge Note    Name: Anoop Harkins Sr.  Clinic Number: [id number]    Therapy Diagnosis:           Encounter Diagnoses   Name Primary?    S/P right rotator cuff repair      Acute pain of right shoulder Yes        Physician: Keven Vazquez Jr., *     Physician Orders: PT Eval and Treat   Medical Diagnosis from Referral: R rotator cuff repair and subacromial debridement   Evaluation Date: 1/19/2024  Date of Last visit: 5/9/2024  Total Visits Received: 38    ASSESSMENT      PT followed up with patient today regarding improvements from last treatment. Patient reported no R shoulder pain and states that he has driven on two long road trip and rode his horse since his last treatment. He reported "I think my arm is good."     Discharge reason: Patient has met all of his goals    Discharge FOTO Score: 69%    Goals:   Patient will independently complete Home exercise program with correct form.-MET    Patient will report R shoulder pain less than or equal to 3/10 for improved quality of life. -MET      Long Term Goals: 6 weeks   Pt will increase R shoulder flexion to 120 degrees, external rotation to C4, and internal rotation to L2 for independent dressing-MET   Pt will increase R upper extremity to 4/5 to allow patient to perform activities of daily living independently-MET except ABD is 3+/5.   Pt will report decrease in pain to 1/10 to improve quality of life.-MET  Pt will place 5 pound object in overhead shelf without hike and with less than or equal to 2/10 pain to allow patient to return to prior level of function -MET   Patient will have increased FOTO score to greater than or equal to 65% for improved function. -MET 69%    PLAN   This patient is discharged from Physical Therapy      Mickey Bustillos, PT, DPT     "

## 2024-06-02 ENCOUNTER — HOSPITAL ENCOUNTER (EMERGENCY)
Facility: HOSPITAL | Age: 74
Discharge: ANOTHER HOSPITAL | End: 2024-06-02
Payer: MEDICARE

## 2024-06-02 VITALS
HEART RATE: 73 BPM | OXYGEN SATURATION: 96 % | DIASTOLIC BLOOD PRESSURE: 84 MMHG | TEMPERATURE: 98 F | HEIGHT: 67 IN | WEIGHT: 170 LBS | SYSTOLIC BLOOD PRESSURE: 144 MMHG | RESPIRATION RATE: 18 BRPM | BODY MASS INDEX: 26.68 KG/M2

## 2024-06-02 DIAGNOSIS — S06.33AA FOCAL HEMORRHAGIC CONTUSION OF CEREBRUM: ICD-10-CM

## 2024-06-02 DIAGNOSIS — S06.5X9D TRAUMATIC SUBDURAL HEMORRHAGE WITH LOSS OF CONSCIOUSNESS, SUBSEQUENT ENCOUNTER: ICD-10-CM

## 2024-06-02 DIAGNOSIS — I62.00 SUBDURAL HEMORRHAGE: Primary | ICD-10-CM

## 2024-06-02 LAB
ANION GAP SERPL CALCULATED.3IONS-SCNC: 16 MMOL/L (ref 7–16)
BASOPHILS # BLD AUTO: 0.01 K/UL (ref 0–0.2)
BASOPHILS NFR BLD AUTO: 0.1 % (ref 0–1)
BUN SERPL-MCNC: 27 MG/DL (ref 7–18)
BUN/CREAT SERPL: 28 (ref 6–20)
CALCIUM SERPL-MCNC: 9.4 MG/DL (ref 8.5–10.1)
CHLORIDE SERPL-SCNC: 98 MMOL/L (ref 98–107)
CO2 SERPL-SCNC: 26 MMOL/L (ref 21–32)
CREAT SERPL-MCNC: 0.98 MG/DL (ref 0.7–1.3)
DIFFERENTIAL METHOD BLD: ABNORMAL
EGFR (NO RACE VARIABLE) (RUSH/TITUS): 82 ML/MIN/1.73M2
EOSINOPHIL # BLD AUTO: 0.01 K/UL (ref 0–0.5)
EOSINOPHIL NFR BLD AUTO: 0.1 % (ref 1–4)
ERYTHROCYTE [DISTWIDTH] IN BLOOD BY AUTOMATED COUNT: 12.5 % (ref 11.5–14.5)
GLUCOSE SERPL-MCNC: 110 MG/DL (ref 70–105)
GLUCOSE SERPL-MCNC: 117 MG/DL (ref 74–106)
HCT VFR BLD AUTO: 38.5 % (ref 40–54)
HGB BLD-MCNC: 13.5 G/DL (ref 13.5–18)
IMM GRANULOCYTES # BLD AUTO: 0.08 K/UL (ref 0–0.04)
IMM GRANULOCYTES NFR BLD: 0.8 % (ref 0–0.4)
LYMPHOCYTES # BLD AUTO: 1.44 K/UL (ref 1–4.8)
LYMPHOCYTES NFR BLD AUTO: 13.9 % (ref 27–41)
MCH RBC QN AUTO: 32.1 PG (ref 27–31)
MCHC RBC AUTO-ENTMCNC: 35.1 G/DL (ref 32–36)
MCV RBC AUTO: 91.4 FL (ref 80–96)
MONOCYTES # BLD AUTO: 0.54 K/UL (ref 0–0.8)
MONOCYTES NFR BLD AUTO: 5.2 % (ref 2–6)
MPC BLD CALC-MCNC: 10.1 FL (ref 9.4–12.4)
NEUTROPHILS # BLD AUTO: 8.26 K/UL (ref 1.8–7.7)
NEUTROPHILS NFR BLD AUTO: 79.9 % (ref 53–65)
NRBC # BLD AUTO: 0 X10E3/UL
NRBC, AUTO (.00): 0 %
PLATELET # BLD AUTO: 203 K/UL (ref 150–400)
POTASSIUM SERPL-SCNC: 3.6 MMOL/L (ref 3.5–5.1)
RBC # BLD AUTO: 4.21 M/UL (ref 4.6–6.2)
SODIUM SERPL-SCNC: 136 MMOL/L (ref 136–145)
WBC # BLD AUTO: 10.34 K/UL (ref 4.5–11)

## 2024-06-02 PROCEDURE — 96375 TX/PRO/DX INJ NEW DRUG ADDON: CPT

## 2024-06-02 PROCEDURE — 99285 EMERGENCY DEPT VISIT HI MDM: CPT

## 2024-06-02 PROCEDURE — 99285 EMERGENCY DEPT VISIT HI MDM: CPT | Mod: 25

## 2024-06-02 PROCEDURE — 25000003 PHARM REV CODE 250: Performed by: PEDIATRICS

## 2024-06-02 PROCEDURE — 85025 COMPLETE CBC W/AUTO DIFF WBC: CPT | Performed by: PEDIATRICS

## 2024-06-02 PROCEDURE — 63600175 PHARM REV CODE 636 W HCPCS: Performed by: PEDIATRICS

## 2024-06-02 PROCEDURE — 82962 GLUCOSE BLOOD TEST: CPT

## 2024-06-02 PROCEDURE — 96374 THER/PROPH/DIAG INJ IV PUSH: CPT

## 2024-06-02 PROCEDURE — 96361 HYDRATE IV INFUSION ADD-ON: CPT

## 2024-06-02 PROCEDURE — 80048 BASIC METABOLIC PNL TOTAL CA: CPT | Performed by: PEDIATRICS

## 2024-06-02 RX ORDER — ONDANSETRON HYDROCHLORIDE 2 MG/ML
4 INJECTION, SOLUTION INTRAVENOUS
Status: COMPLETED | OUTPATIENT
Start: 2024-06-02 | End: 2024-06-02

## 2024-06-02 RX ORDER — MORPHINE SULFATE 4 MG/ML
4 INJECTION, SOLUTION INTRAMUSCULAR; INTRAVENOUS
Status: COMPLETED | OUTPATIENT
Start: 2024-06-02 | End: 2024-06-02

## 2024-06-02 RX ORDER — METHOCARBAMOL 750 MG/1
750 TABLET, FILM COATED ORAL EVERY 6 HOURS PRN
COMMUNITY
Start: 2024-05-31

## 2024-06-02 RX ORDER — SODIUM CHLORIDE 9 MG/ML
1000 INJECTION, SOLUTION INTRAVENOUS
Status: COMPLETED | OUTPATIENT
Start: 2024-06-02 | End: 2024-06-02

## 2024-06-02 RX ORDER — LEVETIRACETAM 500 MG/1
1000 TABLET ORAL 2 TIMES DAILY
COMMUNITY
Start: 2024-05-31

## 2024-06-02 RX ORDER — GABAPENTIN 300 MG/1
300 CAPSULE ORAL 3 TIMES DAILY
COMMUNITY
Start: 2024-05-31

## 2024-06-02 RX ADMIN — MORPHINE SULFATE 4 MG: 4 INJECTION, SOLUTION INTRAMUSCULAR; INTRAVENOUS at 05:06

## 2024-06-02 RX ADMIN — SODIUM CHLORIDE 1000 ML: 9 INJECTION, SOLUTION INTRAVENOUS at 06:06

## 2024-06-02 RX ADMIN — SODIUM CHLORIDE 500 ML: 9 INJECTION, SOLUTION INTRAVENOUS at 05:06

## 2024-06-02 RX ADMIN — ONDANSETRON 4 MG: 2 INJECTION INTRAMUSCULAR; INTRAVENOUS at 05:06

## 2024-06-02 NOTE — ED NOTES
PT ACCEPTED BY DR GARZA [facility] ED, [place], [place]  [phone]    [facility] NU ETA APPROX 1HOUR DUE TO AMBULANCE OUT OF COUNTY AT THIS TIME
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
## 2024-06-02 NOTE — ED NOTES
Pt awakened - pt states I just came here to rest and let me go home-explained to pt that he cannot go home at this time - pt states the hell is that is so- explained to pt he needed to rest - pt with some confused conversation noted - pt is alert to name and place- socks placed on pt- pt states he Is ok but to cover his feet up- warm blanket applied- resp even non labored- pt denies nausea- states pain to head is some better then states its not a squishy feeling
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
## 2024-06-10 NOTE — ADDENDUM NOTE
Encounter addended by: Leah Ortiz on: 6/10/2024 1:22 PM   Actions taken: SmartForm saved, Flowsheet accepted, Charge Capture section accepted

## 2024-06-11 NOTE — ADDENDUM NOTE
Encounter addended by: Zaida Vitale on: 6/11/2024 12:49 PM   Actions taken: Charge Capture section accepted

## 2024-06-28 ENCOUNTER — HOSPITAL ENCOUNTER (OUTPATIENT)
Dept: RADIOLOGY | Facility: HOSPITAL | Age: 74
Discharge: HOME | End: 2024-06-28
Attending: NURSE PRACTITIONER
Payer: MEDICARE

## 2024-06-28 DIAGNOSIS — S06.36AA: ICD-10-CM

## 2024-06-28 DIAGNOSIS — I72.9 PSEUDOANEURYSM: ICD-10-CM

## 2024-06-28 PROCEDURE — 70450 CT HEAD/BRAIN W/O DYE: CPT | Mod: TC

## 2024-06-28 PROCEDURE — 70450 CT HEAD/BRAIN W/O DYE: CPT | Mod: 26,,, | Performed by: RADIOLOGY

## 2024-08-07 ENCOUNTER — CLINICAL SUPPORT (OUTPATIENT)
Dept: REHABILITATION | Facility: HOSPITAL | Age: 74
End: 2024-08-07
Payer: MEDICARE

## 2024-08-07 DIAGNOSIS — R42 VERTIGO: ICD-10-CM

## 2024-08-07 PROCEDURE — 97161 PT EVAL LOW COMPLEX 20 MIN: CPT | Mod: KX

## 2024-08-07 PROCEDURE — 97112 NEUROMUSCULAR REEDUCATION: CPT | Mod: KX

## 2024-08-13 ENCOUNTER — CLINICAL SUPPORT (OUTPATIENT)
Dept: REHABILITATION | Facility: HOSPITAL | Age: 74
End: 2024-08-13
Payer: MEDICARE

## 2024-08-13 DIAGNOSIS — R42 VERTIGO: Primary | ICD-10-CM

## 2024-08-13 PROCEDURE — 97112 NEUROMUSCULAR REEDUCATION: CPT

## 2024-08-13 NOTE — PROGRESS NOTES
"[facility]   Physical Therapy Treatment Note      Name: Anoop Harkins Sr.  Clinic Number: [id number]    Therapy Diagnosis: Dizziness   Physician: Laila Cruz FNP    Visit Date: 8/13/2024    Physician Orders: PT Eval and Treat   Medical Diagnosis from Referral: Vertigo   Evaluation Date: 8/7/2024  Authorization Period Expiration: 8/1/2025   Plan of Care Expiration: 9/6/2025  Progress Note Due: 9/6/2025  Visit # / Visits authorized: 2/ 8   FOTO: to be completed on visit 4     Precautions: Standard      Time In: 14:00  Time Out: 14:30  Total Billable Time: 30 minutes    PTA Visit #: 0/5     Subjective   Patient reports: "Friday morning I tried to jump up out of bed to answer the phone and I got really dizzy and stayed like that until about lunch.: Patient reports decreased dizziness when getting out of the car and into bed since last treatment.   Functional change: less dizziness with bed mobility    Dizziness: 0/10  Objective      Objective Measures updated at progress report unless specified.     Treatment     Elio received the treatments listed below:       neuromuscular re-education (24 minutes) -canalith repositioning maneuver: PT completed 3 trials of Epley's maneuver to the R side to decrease patient's dizziness with positional changes. PT allowed patient to rest for approximately 2 minutes between each trial.  Patient Education and Home Exercises     Continue monitoring symptoms and triggers.   Assessment   Anoop is a 74 y.o. male referred to outpatient Physical Therapy with a medical diagnosis of vertigo. Patient presents with episodes of vertigo with positional changes that are impacting his daily activity tolerance. PT noted minimal nystagmus that lasted less than 5 seconds on R [place] Hallpike testing and during first treatment position of first trial of Epley's maneuver. Patient had reports of dizziness when transferring sidelying to sit during first and second trials of Epley's " maneuver. Patient had no reports of dizziness with last Epley's maneuver. PT completed patient assessment with 15 seconds of cervical extension and patient had moderate trunk sway and reported minimal dizziness. When returning head to neutral positioning patient did not have a loss of balance like during his last treatment session. PT instructed patient to continue monitoring his symptoms and triggers.     Patient prognosis is Good.     Patient will continue to benefit from skilled outpatient physical therapy to address the deficits listed in the problem list box on initial evaluation, provide pt/family education and to maximize pt's level of independence in the home and community environment.     Patient's spiritual, cultural and educational needs considered and pt agreeable to plan of care and goals.     Anticipated barriers to physical therapy: chronicity of symtpoms, multiple previous head injuries     Goals:  Short Term Goals: 2 weeks   Patient will report decreased episodes of dizziness by 50% for improved quality of life.  Patient will report no dizziness when transferring sit<>supine for improved quality of life.      Long Term Goals: 4 weeks   Patient will report decreased episodes of dizziness by greater than or equal to 80% for improved quality of life.   Patient will report no dizziness when getting out of the car or off of his /tractor for return to PLOF.   Patient will report no dizziness when when looking down from prolonged cervical extension for return to PLOF.      Plan   Plan of care Certification: 8/7/2024 to 9/6/2024.     Outpatient Physical Therapy 2 times weekly for 4 weeks to include the following interventions: Neuromuscular Re-ed, Patient Education, and Therapeutic Exercise.     Mickey Bustillos, PT, DPT

## 2024-08-15 ENCOUNTER — CLINICAL SUPPORT (OUTPATIENT)
Dept: REHABILITATION | Facility: HOSPITAL | Age: 74
End: 2024-08-15
Payer: MEDICARE

## 2024-08-15 DIAGNOSIS — R42 VERTIGO: Primary | ICD-10-CM

## 2024-08-15 PROCEDURE — 97112 NEUROMUSCULAR REEDUCATION: CPT

## 2024-08-15 NOTE — PROGRESS NOTES
"[facility]   Physical Therapy Treatment Note      Name: Anoop Harkins Sr.  Clinic Number: [id number]    Therapy Diagnosis: Dizziness   Physician: Laila Cruz FNP    Visit Date: 8/15/2024    Physician Orders: PT Eval and Treat   Medical Diagnosis from Referral: Vertigo   Evaluation Date: 8/7/2024  Authorization Period Expiration: 8/1/2025   Plan of Care Expiration: 9/6/2025  Progress Note Due: 9/6/2025  Visit # / Visits authorized: 3/ 8   FOTO: to be completed on visit 4     Precautions: Standard      Time In: 8:00  Time Out: 8:40  Total Billable Time: 40 minutes    PTA Visit #: 0/5     Subjective   Patient reports: "I was pretty dizzy again yesterday morning for a while."   Functional change: less dizziness with getting out vehicles and lying down in bed     Dizziness: 0/10  Objective      Objective Measures updated at progress report unless specified.     Treatment     Elio received the treatments listed below:       neuromuscular re-education (25 minutes) -canalith repositioning maneuver: PT completed 3 trials of Epley's maneuver to the R side to decrease patient's dizziness with positional changes. PT allowed patient to rest for approximately 2 minutes between each trial.  Patient Education and Home Exercises     Continue monitoring symptoms and triggers.   Assessment   Anoop is a 74 y.o. male referred to outpatient Physical Therapy with a medical diagnosis of vertigo. Patient presents with episodes of vertigo with positional changes that are impacting his daily activity tolerance. PT completed blood pressure assessments for postural hypotension prior to treatment. Noted blood pressures listed below. PT educated patient on positional hypotension. PT noted minimal nystagmus that lasted less than 5 seconds in first two testing positions during first trial of Epley's maneuver. Patient reported "feeling swimmy" when transitioning sidleying to sitting during first trial also. PT noted no " further nystagmus and patient had no further reports of dizziness during treatment. PT tested patient with prolonged cervical extension and patient had no reports of dizziness and no loss of balance.     Testing Position  Blood Pressure        Seated with feet supported  143/86   Standing (upon initial stance)  144/81   Supine  129/82   Seated (feet dangling)  127/65                                       Patient prognosis is Good.     Patient will continue to benefit from skilled outpatient physical therapy to address the deficits listed in the problem list box on initial evaluation, provide pt/family education and to maximize pt's level of independence in the home and community environment.     Patient's spiritual, cultural and educational needs considered and pt agreeable to plan of care and goals.     Anticipated barriers to physical therapy: chronicity of symtpoms, multiple previous head injuries     Goals:  Short Term Goals: 2 weeks   Patient will report decreased episodes of dizziness by 50% for improved quality of life.  Patient will report no dizziness when transferring sit<>supine for improved quality of life.      Long Term Goals: 4 weeks   Patient will report decreased episodes of dizziness by greater than or equal to 80% for improved quality of life.   Patient will report no dizziness when getting out of the car or off of his /tractor for return to PLOF.   Patient will report no dizziness when when looking down from prolonged cervical extension for return to PLOF.      Plan   Plan of care Certification: 8/7/2024 to 9/6/2024.     Outpatient Physical Therapy 2 times weekly for 4 weeks to include the following interventions: Neuromuscular Re-ed, Patient Education, and Therapeutic Exercise.     Mickey Bustillos, PT, DPT

## 2024-08-20 ENCOUNTER — CLINICAL SUPPORT (OUTPATIENT)
Dept: REHABILITATION | Facility: HOSPITAL | Age: 74
End: 2024-08-20
Payer: MEDICARE

## 2024-08-20 DIAGNOSIS — R42 VERTIGO: Primary | ICD-10-CM

## 2024-08-20 PROCEDURE — 97112 NEUROMUSCULAR REEDUCATION: CPT | Mod: KX

## 2024-08-20 NOTE — PROGRESS NOTES
"[facility]   Physical Therapy Treatment Note      Name: Anoop Harkins Sr.  Clinic Number: [id number]    Therapy Diagnosis: Dizziness   Physician: Laila Cruz FNP    Visit Date: 8/20/2024    Physician Orders: PT Eval and Treat   Medical Diagnosis from Referral: Vertigo   Evaluation Date: 8/7/2024  Authorization Period Expiration: 8/1/2025   Plan of Care Expiration: 9/6/2025  Progress Note Due: 9/6/2025  Visit # / Visits authorized: 4/ 8   FOTO: to be completed on visit 4     Precautions: Standard      Time In: 8:48  Time Out: 9:12  Total Billable Time: 24 minutes    PTA Visit #: 0/5     Subjective   Patient reports: "I haven't had an of those dizzy spells since the last time. I still get the occasional waves when sitting up in bed or standing up too fast but the room doesn't spin."   Functional change: less dizziness with getting out vehicles and lying down in bed     Dizziness: 0/10  Objective      Objective Measures updated at progress report unless specified.     Treatment     Elio received the treatments listed below:       neuromuscular re-education (24 minutes) -canalith repositioning maneuver: PT completed 1 trial of Epley's maneuver to the R side to promote proper otoconia positioning in vestibular canals.     Prolonged cervical extension-completed  Repeated trunk flexion and extension- completed   Standing on blue foam pad with eyes open narrow GABRIEL -completed  Standing on blue foam pad with eye closed with narrow GABRIEL- completed     Patient Education and Home Exercises     Continue monitoring symptoms and triggers.   Assessment   Anoop is a 74 y.o. male referred to outpatient Physical Therapy with a medical diagnosis of vertigo. Patient reported decreased episodes of vertigo since beginning physical therapy plan of care. PT completed patient [place] [facility] vestibular assessment to R and L sides and patient had no reports of vertigo and PT noted no nystagmus. PT completed one trial " "of Epley's maneuver to R side to continue promoting improved otoconia positioning in vestibular canals. Patient had no nystagmus and no reports of vertigo during treatment. PT completed patient assessment for symptoms of vertigo with repeated trunk flexion and extensions and with prolonged cervical extension. PT also completed patient assessment for decreased balance with standing on non-compliant surface with eyes open and closed. Patient only demonstrated minimal trunk sway with eyes closed. Patient reported, "I think I am doing much better," at end of session. PT to assess patient at next scheduled visit for possible discharge pending reports of dizziness.         Patient prognosis is Good.     Patient will continue to benefit from skilled outpatient physical therapy to address the deficits listed in the problem list box on initial evaluation, provide pt/family education and to maximize pt's level of independence in the home and community environment.     Patient's spiritual, cultural and educational needs considered and pt agreeable to plan of care and goals.     Anticipated barriers to physical therapy: chronicity of symtpoms, multiple previous head injuries     Goals:  Short Term Goals: 2 weeks   Patient will report decreased episodes of dizziness by 50% for improved quality of life.  Patient will report no dizziness when transferring sit<>supine for improved quality of life.      Long Term Goals: 4 weeks   Patient will report decreased episodes of dizziness by greater than or equal to 80% for improved quality of life.   Patient will report no dizziness when getting out of the car or off of his /tractor for return to PLOF.   Patient will report no dizziness when when looking down from prolonged cervical extension for return to PLOF.      Plan   Plan of care Certification: 8/7/2024 to 9/6/2024.     Outpatient Physical Therapy 2 times weekly for 4 weeks to include the following interventions: " Neuromuscular Re-ed, Patient Education, and Therapeutic Exercise.     Mickey Bustillos, PT, DPT

## 2024-08-22 ENCOUNTER — CLINICAL SUPPORT (OUTPATIENT)
Dept: REHABILITATION | Facility: HOSPITAL | Age: 74
End: 2024-08-22
Payer: MEDICARE

## 2024-08-22 DIAGNOSIS — R42 VERTIGO: Primary | ICD-10-CM

## 2024-08-22 PROCEDURE — 97112 NEUROMUSCULAR REEDUCATION: CPT | Mod: KX

## 2024-08-22 NOTE — PROGRESS NOTES
"[facility]   Physical Therapy Treatment Note      Name: Anoop Harkins Sr.  Clinic Number: [id number]    Therapy Diagnosis: Dizziness   Physician: Laila Cruz FNP    Visit Date: 8/22/2024    Physician Orders: PT Eval and Treat   Medical Diagnosis from Referral: Vertigo   Evaluation Date: 8/7/2024  Authorization Period Expiration: 8/1/2025   Plan of Care Expiration: 9/6/2025  Progress Note Due: 9/6/2025  Visit # / Visits authorized: 6/ 8   FOTO: to be completed on visit 6     Precautions: Standard      Time In: 7:58  Time Out: 8:12  Total Billable Time:  14 minutes    PTA Visit #: 0/5     Subjective   Patient reports: "I still have some episodes where my legs don't do what I tell them too and I get off balance but I am not having those spells that I can't control anymore."   Functional change: no episodes of vertigo with bed mobility and ADLs    Dizziness: 0/10  Objective      Objective Measures updated at progress report unless specified.     Treatment     Elio received the treatments listed below:       neuromuscular re-education (12 minutes) -canalith repositioning maneuver: PT completed 1 trial of Epley's maneuver to the R side to promote proper otoconia positioning in vestibular canals.     Patient Education and Home Exercises     Continue monitoring symptoms and triggers.   Assessment   Anoop is a 74 y.o. male referred to outpatient Physical Therapy with a medical diagnosis of vertigo. Patient reported decreased episodes of vertigo since beginning physical therapy plan of care. Patient with no reports of vertigo in the past few days and visits. PT completed patient assessment with [place] [facility] and patient had no reports of vertigo and no visible nystagmus. PT completed one trial of Epley's Maneuver and patient had no nystagmus of reports of vertigo during any positions. Patient and PT discussed patient's progress since beginning PT plan of care. Patient reports that he continues to " have episodes of decreased balance, but he hasn't had any episodes of feeling like the room is spinning around him in over a week. PT provided patient education on process of brain recovery from his previous injury. PT and patient agree assess patient for any further symptoms over the weekend and possibly discharge next week pending outcomes.       Patient prognosis is Good.     Patient will continue to benefit from skilled outpatient physical therapy to address the deficits listed in the problem list box on initial evaluation, provide pt/family education and to maximize pt's level of independence in the home and community environment.     Patient's spiritual, cultural and educational needs considered and pt agreeable to plan of care and goals.     Anticipated barriers to physical therapy: chronicity of symtpoms, multiple previous head injuries     Goals:  Short Term Goals: 2 weeks   Patient will report decreased episodes of dizziness by 50% for improved quality of life.-MET   Patient will report no dizziness when transferring sit<>supine for improved quality of life. -MET      Long Term Goals: 4 weeks   Patient will report decreased episodes of dizziness by greater than or equal to 80% for improved quality of life. -MET   Patient will report no dizziness when getting out of the car or off of his /tractor for return to PLOF. -MET   Patient will report no dizziness when when looking down from prolonged cervical extension for return to PLOF. -MET      Plan   Plan of care Certification: 8/7/2024 to 9/6/2024.     Outpatient Physical Therapy 2 times weekly for 4 weeks to include the following interventions: Neuromuscular Re-ed, Patient Education, and Therapeutic Exercise.     Mickey Bustillos, PT, DPT

## 2024-08-27 ENCOUNTER — DOCUMENTATION ONLY (OUTPATIENT)
Dept: REHABILITATION | Facility: HOSPITAL | Age: 74
End: 2024-08-27
Payer: MEDICARE

## 2024-08-27 NOTE — PROGRESS NOTES
[facility]  PT Discharge Note    Name: Anoop Harkins Sr.  Clinic Number: [id number]    Therapy Diagnosis: Dizziness   Physician: Laila Cruz FNP  Physician Orders: PT Eval and Treat   Medical Diagnosis from Referral: Vertigo   Evaluation Date: 8/7/2024  Date of Last visit: 8/22/2024  Total Visits Received: 6    ASSESSMENT      Patient called in and reported that his symptoms of vertigo have resolved since beginning PT treatment. He denied any further symptoms and requested discharge.     Discharge reason: Patient has met all of his goals    Discharge FOTO Score: 56%    Goals:   Short Term Goals: 2 weeks   Patient will report decreased episodes of dizziness by 50% for improved quality of life.-MET   Patient will report no dizziness when transferring sit<>supine for improved quality of life. -MET      Long Term Goals: 4 weeks   Patient will report decreased episodes of dizziness by greater than or equal to 80% for improved quality of life. -MET   Patient will report no dizziness when getting out of the car or off of his /tractor for return to PLOF. -MET   Patient will report no dizziness when when looking down from prolonged cervical extension for return to PLOF. -MET     PLAN   This patient is discharged from Physical Therapy      Mickey Bustillos, PT, DPT

## 2024-10-21 ENCOUNTER — HOSPITAL ENCOUNTER (OUTPATIENT)
Dept: RADIOLOGY | Facility: HOSPITAL | Age: 74
Discharge: HOME | End: 2024-10-21
Attending: EMERGENCY MEDICINE
Payer: MEDICARE

## 2024-10-21 DIAGNOSIS — I61.9: ICD-10-CM

## 2024-10-21 DIAGNOSIS — S02.91XA SKULL FRACTURE: ICD-10-CM

## 2024-10-21 PROCEDURE — 70450 CT HEAD/BRAIN W/O DYE: CPT | Mod: TC
